# Patient Record
Sex: FEMALE | Race: WHITE | Employment: FULL TIME | URBAN - NONMETROPOLITAN AREA
[De-identification: names, ages, dates, MRNs, and addresses within clinical notes are randomized per-mention and may not be internally consistent; named-entity substitution may affect disease eponyms.]

---

## 2019-07-02 ENCOUNTER — OFFICE VISIT (OUTPATIENT)
Dept: FAMILY MEDICINE CLINIC | Age: 54
End: 2019-07-02
Payer: COMMERCIAL

## 2019-07-02 ENCOUNTER — HOSPITAL ENCOUNTER (OUTPATIENT)
Age: 54
Discharge: HOME | End: 2019-07-04
Payer: COMMERCIAL

## 2019-07-02 VITALS
DIASTOLIC BLOOD PRESSURE: 72 MMHG | WEIGHT: 279 LBS | OXYGEN SATURATION: 96 % | SYSTOLIC BLOOD PRESSURE: 130 MMHG | HEART RATE: 76 BPM | HEIGHT: 69 IN | BODY MASS INDEX: 41.32 KG/M2

## 2019-07-02 DIAGNOSIS — I89.0 LYMPHEDEMA: ICD-10-CM

## 2019-07-02 DIAGNOSIS — J43.2 CENTRILOBULAR EMPHYSEMA (HCC): ICD-10-CM

## 2019-07-02 DIAGNOSIS — I50.9 CHRONIC CONGESTIVE HEART FAILURE, UNSPECIFIED HEART FAILURE TYPE (HCC): ICD-10-CM

## 2019-07-02 DIAGNOSIS — E78.2 MIXED HYPERLIPIDEMIA: ICD-10-CM

## 2019-07-02 DIAGNOSIS — I25.10 CORONARY ARTERY DISEASE INVOLVING NATIVE CORONARY ARTERY OF NATIVE HEART WITHOUT ANGINA PECTORIS: Primary | ICD-10-CM

## 2019-07-02 DIAGNOSIS — I25.10 CORONARY ARTERY DISEASE INVOLVING NATIVE CORONARY ARTERY OF NATIVE HEART WITHOUT ANGINA PECTORIS: ICD-10-CM

## 2019-07-02 DIAGNOSIS — Z72.0 TOBACCO ABUSE: ICD-10-CM

## 2019-07-02 LAB
ALBUMIN SERPL-MCNC: 4.1 G/DL (ref 3.5–5.2)
ALP BLD-CCNC: 135 U/L (ref 35–104)
ALT SERPL-CCNC: 23 U/L (ref 0–32)
ANION GAP SERPL CALCULATED.3IONS-SCNC: 18 MMOL/L (ref 7–16)
AST SERPL-CCNC: 29 U/L (ref 0–31)
BILIRUB SERPL-MCNC: 0.4 MG/DL (ref 0–1.2)
BUN BLDV-MCNC: 12 MG/DL (ref 6–20)
CALCIUM SERPL-MCNC: 9.6 MG/DL (ref 8.6–10.2)
CHLORIDE BLD-SCNC: 103 MMOL/L (ref 98–107)
CHOLESTEROL, TOTAL: 256 MG/DL (ref 0–199)
CO2: 22 MMOL/L (ref 22–29)
CREAT SERPL-MCNC: 0.8 MG/DL (ref 0.5–1)
GFR AFRICAN AMERICAN: >60
GFR NON-AFRICAN AMERICAN: >60 ML/MIN/1.73
GLUCOSE BLD-MCNC: 110 MG/DL (ref 74–99)
HDLC SERPL-MCNC: 36 MG/DL
LDL CHOLESTEROL CALCULATED: 187 MG/DL (ref 0–99)
POTASSIUM SERPL-SCNC: 4.3 MMOL/L (ref 3.5–5)
PRO-BNP: 292 PG/ML (ref 0–125)
SODIUM BLD-SCNC: 143 MMOL/L (ref 132–146)
TOTAL PROTEIN: 7.5 G/DL (ref 6.4–8.3)
TRIGL SERPL-MCNC: 165 MG/DL (ref 0–149)
TSH SERPL DL<=0.05 MIU/L-ACNC: 2.5 UIU/ML (ref 0.27–4.2)
VLDLC SERPL CALC-MCNC: 33 MG/DL

## 2019-07-02 PROCEDURE — 83880 ASSAY OF NATRIURETIC PEPTIDE: CPT

## 2019-07-02 PROCEDURE — 99205 OFFICE O/P NEW HI 60 MIN: CPT | Performed by: INTERNAL MEDICINE

## 2019-07-02 PROCEDURE — 80053 COMPREHEN METABOLIC PANEL: CPT

## 2019-07-02 PROCEDURE — 84443 ASSAY THYROID STIM HORMONE: CPT

## 2019-07-02 PROCEDURE — 36415 COLL VENOUS BLD VENIPUNCTURE: CPT

## 2019-07-02 PROCEDURE — 80061 LIPID PANEL: CPT

## 2019-07-02 RX ORDER — TORSEMIDE 100 MG/1
100 TABLET ORAL
COMMUNITY
End: 2019-07-02 | Stop reason: SDUPTHER

## 2019-07-02 RX ORDER — CLOPIDOGREL BISULFATE 75 MG/1
TABLET ORAL
Refills: 11 | COMMUNITY
Start: 2019-05-21 | End: 2020-01-06 | Stop reason: SDUPTHER

## 2019-07-02 RX ORDER — ALBUTEROL SULFATE 90 UG/1
2 AEROSOL, METERED RESPIRATORY (INHALATION) EVERY 6 HOURS PRN
COMMUNITY
End: 2019-07-02 | Stop reason: SDUPTHER

## 2019-07-02 RX ORDER — LISINOPRIL 10 MG/1
TABLET ORAL
Refills: 5 | COMMUNITY
Start: 2019-05-21 | End: 2020-06-02

## 2019-07-02 RX ORDER — CILOSTAZOL 50 MG/1
50 TABLET ORAL
COMMUNITY
End: 2020-06-02

## 2019-07-02 RX ORDER — ATORVASTATIN CALCIUM 40 MG/1
40 TABLET, FILM COATED ORAL
COMMUNITY
End: 2019-07-03

## 2019-07-02 RX ORDER — RANOLAZINE 1000 MG/1
TABLET, EXTENDED RELEASE ORAL
Refills: 3 | COMMUNITY
Start: 2019-05-21 | End: 2019-10-09 | Stop reason: ALTCHOICE

## 2019-07-02 RX ORDER — TORSEMIDE 100 MG/1
100 TABLET ORAL DAILY
Qty: 30 TABLET | Refills: 5 | Status: SHIPPED | OUTPATIENT
Start: 2019-07-02 | End: 2020-01-06 | Stop reason: SDUPTHER

## 2019-07-02 RX ORDER — ISOSORBIDE DINITRATE 30 MG/1
30 TABLET ORAL
COMMUNITY
End: 2019-07-02 | Stop reason: SDUPTHER

## 2019-07-02 RX ORDER — ISOSORBIDE DINITRATE 30 MG/1
30 TABLET ORAL DAILY
Qty: 30 TABLET | Refills: 5 | Status: SHIPPED | OUTPATIENT
Start: 2019-07-02 | End: 2020-01-06 | Stop reason: SDUPTHER

## 2019-07-02 RX ORDER — POTASSIUM CHLORIDE 750 MG/1
10 TABLET, FILM COATED, EXTENDED RELEASE ORAL DAILY PRN
COMMUNITY
End: 2020-01-06 | Stop reason: SDUPTHER

## 2019-07-02 RX ORDER — ALBUTEROL SULFATE 90 UG/1
2 AEROSOL, METERED RESPIRATORY (INHALATION) EVERY 6 HOURS PRN
Qty: 1 INHALER | Refills: 5 | Status: SHIPPED | OUTPATIENT
Start: 2019-07-02 | End: 2020-01-06 | Stop reason: SDUPTHER

## 2019-07-02 RX ORDER — METOPROLOL SUCCINATE 50 MG/1
50 TABLET, EXTENDED RELEASE ORAL
COMMUNITY
End: 2020-06-02

## 2019-07-02 SDOH — HEALTH STABILITY: PHYSICAL HEALTH: ON AVERAGE, HOW MANY DAYS PER WEEK DO YOU ENGAGE IN MODERATE TO STRENUOUS EXERCISE (LIKE A BRISK WALK)?: 0 DAYS

## 2019-07-02 SDOH — HEALTH STABILITY: MENTAL HEALTH
STRESS IS WHEN SOMEONE FEELS TENSE, NERVOUS, ANXIOUS, OR CAN'T SLEEP AT NIGHT BECAUSE THEIR MIND IS TROUBLED. HOW STRESSED ARE YOU?: VERY MUCH

## 2019-07-02 SDOH — HEALTH STABILITY: PHYSICAL HEALTH: ON AVERAGE, HOW MANY MINUTES DO YOU ENGAGE IN EXERCISE AT THIS LEVEL?: 0 MIN

## 2019-07-02 ASSESSMENT — ENCOUNTER SYMPTOMS
GASTROINTESTINAL NEGATIVE: 1
SHORTNESS OF BREATH: 1
EYES NEGATIVE: 1
WHEEZING: 1
ALLERGIC/IMMUNOLOGIC NEGATIVE: 1

## 2019-07-02 NOTE — PROGRESS NOTES
to person, place, and time. Skin:   Venous stasis changes below the mid tibia bilaterally. Psychiatric: She has a normal mood and affect. Her behavior is normal. Judgment and thought content normal.       ASSESSMENT/PLAN:  Jimi Garnica was seen today for hypertension and hyperlipidemia. Diagnoses and all orders for this visit:    Coronary artery disease involving native coronary artery of native heart without angina pectoris  -     LIPID PANEL; Future  -     TSH; Future    Centrilobular emphysema (HCC)  -     XR CHEST STANDARD (2 VW); Future  -     Full PFT Study With Bronchodilator; Future    Chronic congestive heart failure, unspecified heart failure type ([facility] [address]  -     COMPREHENSIVE METABOLIC PANEL; Future  -     BRAIN NATRIURETIC PEPTIDE; Future    Tobacco abuse    Mixed hyperlipidemia    Lymphedema  -     External Referral To Physical Therapy    Other orders  -     torsemide (DEMADEX) 100 MG tablet; Take 1 tablet by mouth daily  -     isosorbide dinitrate (ISORDIL) 30 MG tablet; Take 1 tablet by mouth daily  -     albuterol sulfate  (90 Base) MCG/ACT inhaler; Inhale 2 puffs into the lungs every 6 hours as needed for Wheezing  -     albuterol-ipratropium (COMBIVENT RESPIMAT)  MCG/ACT AERS inhaler; Inhale 1 puff into the lungs every 6 hours  -     ADVAIR DISKUS 250-50 MCG/DOSE AEPB; Inhale 1 puff into the lungs every 12 hours       Jimi Garnica was seen today for hypertension and hyperlipidemia. Diagnoses and all orders for this visit:    Coronary artery disease involving native coronary artery of native heart without angina pectoris  -     LIPID PANEL; Future  -     TSH; Future    Centrilobular emphysema (HCC)  -     XR CHEST STANDARD (2 VW); Future  -     Full PFT Study With Bronchodilator; Future    Chronic congest[facility] heart failure, unspecified [facility] type [facility]  -     COMPREHENSIVE METABOLIC PANEL; Future  -     BRAIN NATRIURETIC PEPTIDE;  Future    Tobacco abuse    Mixed

## 2019-07-03 ENCOUNTER — TELEPHONE (OUTPATIENT)
Dept: FAMILY MEDICINE CLINIC | Age: 54
End: 2019-07-03

## 2019-07-03 DIAGNOSIS — I25.10 CORONARY ARTERY DISEASE INVOLVING NATIVE CORONARY ARTERY OF NATIVE HEART WITHOUT ANGINA PECTORIS: Primary | ICD-10-CM

## 2019-07-03 RX ORDER — ROSUVASTATIN CALCIUM 40 MG/1
40 TABLET, COATED ORAL DAILY
Qty: 30 TABLET | Refills: 5 | Status: SHIPPED | OUTPATIENT
Start: 2019-07-03 | End: 2020-01-06 | Stop reason: SDUPTHER

## 2019-07-30 LAB
DLCO %PRED: 65 %
DLCO PRED: NORMAL ML/MIN/MMHG
DLCO/VA %PRED: NORMAL %
DLCO/VA PRED: NORMAL ML/MIN/MMHG
DLCO/VA: NORMAL ML/MIN/MMHG
DLCO: NORMAL ML/MIN/MMHG
EXPIRATORY TIME-POST: NORMAL SEC
EXPIRATORY TIME: NORMAL SEC
FEF 25-75% %CHNG: NORMAL
FEF 25-75% %PRED-POST: NORMAL %
FEF 25-75% %PRED-PRE: NORMAL L/SEC
FEF 25-75% PRED: NORMAL L/SEC
FEF 25-75%-POST: NORMAL L/SEC
FEF 25-75%-PRE: NORMAL L/SEC
FEV1 %PRED-POST: 62 %
FEV1 %PRED-PRE: 56 %
FEV1 PRED: NORMAL L
FEV1-POST: NORMAL L
FEV1-PRE: NORMAL L
FEV1/FVC %PRED-POST: NORMAL %
FEV1/FVC %PRED-PRE: NORMAL %
FEV1/FVC PRED: NORMAL %
FEV1/FVC-POST: 102 %
FEV1/FVC-PRE: 100 %
FVC %PRED-POST: NORMAL L
FVC %PRED-PRE: NORMAL %
FVC PRED: NORMAL L
FVC-POST: NORMAL L
FVC-PRE: NORMAL L
GAW %PRED: NORMAL %
GAW PRED: NORMAL L/S/CMH2O
GAW: NORMAL L/S/CMH2O
IC %PRED: NORMAL %
IC PRED: NORMAL L
IC: NORMAL L
MEP: NORMAL
MIP: NORMAL
MVV %PRED-PRE: NORMAL %
MVV PRED: NORMAL L/MIN
MVV-PRE: NORMAL L/MIN
PEF %PRED-POST: NORMAL %
PEF %PRED-PRE: NORMAL L/SEC
PEF PRED: NORMAL L/SEC
PEF%CHNG: NORMAL
PEF-POST: NORMAL L/SEC
PEF-PRE: NORMAL L/SEC
RAW %PRED: NORMAL %
RAW PRED: NORMAL CMH2O/L/S
RAW: NORMAL CMH2O/L/S
RV %PRED: NORMAL %
RV PRED: NORMAL L
RV: NORMAL L
SVC %PRED: NORMAL %
SVC PRED: NORMAL L
SVC: NORMAL L
TLC %PRED: 97 %
TLC PRED: NORMAL L
TLC: NORMAL L
VA %PRED: NORMAL %
VA PRED: NORMAL L
VA: NORMAL L
VTG %PRED: NORMAL %
VTG PRED: NORMAL L
VTG: NORMAL L

## 2019-07-30 ASSESSMENT — PULMONARY FUNCTION TESTS
FEV1/FVC_PRE: 100
FEV1_PERCENT_PREDICTED_POST: 62
FEV1/FVC_POST: 102
FEV1_PERCENT_PREDICTED_PRE: 56

## 2019-08-06 ENCOUNTER — OFFICE VISIT (OUTPATIENT)
Dept: FAMILY MEDICINE CLINIC | Age: 54
End: 2019-08-06
Payer: COMMERCIAL

## 2019-08-06 VITALS
HEIGHT: 68 IN | DIASTOLIC BLOOD PRESSURE: 80 MMHG | SYSTOLIC BLOOD PRESSURE: 128 MMHG | OXYGEN SATURATION: 96 % | HEART RATE: 79 BPM | WEIGHT: 283 LBS | BODY MASS INDEX: 42.89 KG/M2

## 2019-08-06 DIAGNOSIS — I50.9 CHRONIC CONGESTIVE HEART FAILURE, UNSPECIFIED HEART FAILURE TYPE (HCC): ICD-10-CM

## 2019-08-06 DIAGNOSIS — I89.0 LYMPHEDEMA: ICD-10-CM

## 2019-08-06 DIAGNOSIS — Z72.0 TOBACCO ABUSE: ICD-10-CM

## 2019-08-06 DIAGNOSIS — J43.2 CENTRILOBULAR EMPHYSEMA (HCC): Primary | ICD-10-CM

## 2019-08-06 DIAGNOSIS — E78.2 MIXED HYPERLIPIDEMIA: ICD-10-CM

## 2019-08-06 DIAGNOSIS — I25.10 CORONARY ARTERY DISEASE INVOLVING NATIVE CORONARY ARTERY OF NATIVE HEART WITHOUT ANGINA PECTORIS: ICD-10-CM

## 2019-08-06 PROCEDURE — 99214 OFFICE O/P EST MOD 30 MIN: CPT | Performed by: INTERNAL MEDICINE

## 2019-08-06 ASSESSMENT — ENCOUNTER SYMPTOMS
SHORTNESS OF BREATH: 1
WHEEZING: 1
GASTROINTESTINAL NEGATIVE: 1
ALLERGIC/IMMUNOLOGIC NEGATIVE: 1
EYES NEGATIVE: 1

## 2019-08-06 NOTE — PROGRESS NOTES
Historical Provider, MD   clopidogrel (PLAVIX) 75 MG tablet TAKE 1 TABLET BY MOUTH ONCE DAILY  Historical Provider, MD   lisinopril (PRINIVIL;ZESTRIL) 10 MG tablet TAKE 1 TABLET BY MOUTH ONCE DAILY AT  9AM  Historical Provider, MD   metoprolol succinate (TOPROL XL) 50 MG extended release tablet Take 50 mg by mouth   Historical Provider, MD   ranolazine (RANEXA) 1000 MG extended release tablet TAKE 1 TABLET BY MOUTH EVERY 12 HOURS AS DIRECTED  Historical Provider, MD   torsemide (DEMADEX) 100 MG tablet Take 1 tablet by mouth daily  Ricky Steel MD   isosorbide dinitrate (ISORDIL) 30 MG tablet Take 1 tablet by mouth daily  Ricky Steel MD   albuterol sulfate  (90 Base) MCG/ACT inhaler Inhale 2 puffs into the lungs every 6 hours as needed for Wheezing  Ricky Steel MD   albuterol-ipratropium (COMBIVENT RESPIMAT)  MCG/ACT AERS inhaler Inhale 1 puff into the lungs every 6 hours  Ricky Steel MD        Allergies   Allergen Reactions    Iodides      Could not breathe, gasping, coughing       Past Medical History:   Diagnosis Date    CAD (coronary artery disease) 2005    First MI at age 35.,  CABG in 2005 triple vessel, total of 4 stents placed-last in October 2018.     COPD (chronic obstructive pulmonary disease) ([facility] [address] 1    Enuresis age 12    HTN (hypertension) 2005    Hyperlipidemia 2005    Lumbar disc disease     Lymphedema 10/2018    Tobacco abuse 1985    Tobacco abuse counseling July second 2019    Warthin's tumor 10/2018       Past Surgical History:   Procedure Laterality Date    CORONARY ARTERY BYPASS GRAFT  2005    three vessel    CYSTOSCOPY      For enuresis    HYSTERECTOMY      Menorrhagia    TONSILLECTOMY AND ADENOIDECTOMY      as a child    TUBAL LIGATION         Social History     Socioeconomic History    Marital status:      Spouse name: Not on file    Number of children: Not on file    Years of education: Not on file    Highest education level: Not on file Occupational History    Not on file   Social Needs    Financial resource strain: Not on file    Food insecurity:     Worry: Not on file     Inability: Not on file    Transportation needs:     Medical: Not on file     Non-medical: Not on file   Tobacco Use    Smoking status: Current Every Day Smoker     Packs/day: 0.50     Years: 40.00     Pack years: 20.00     Types: Cigarettes    Smokeless tobacco: Never Used   Substance and Sexual Activity    Alcohol use: Not Currently    Drug use: Never    Sexual activity: Not on file   Lifestyle    Physical activity:     Days per week: 0 days     Minutes per session: 0 min    Stress: Very much   Relationships    Social connections:     Talks on phone: Not on file     Gets together: Not on file     Attends Quaker service: Not on file     Active member of club or organization: Not on file     Attends meetings of clubs or organizations: Not on file     Relationship status: Not on file    Intimate partner violence:     Fear of current or ex partner: Not on file     Emotionally abused: Not on file     Physically abused: Not on file     Forced sexual activity: Not on file   Other Topics Concern    Not on file   Social History Narrative    Not on file        Family History   Problem Relation Age of Onset    Coronary Art Dis Mother     Diabetes Mother     Cancer Mother     COPD Father        There were no vitals filed for this visit. Estimated body mass index is 41.8 kg/m² as calculated from the following:    Height as of 7/2/19: 5' 8.5\" (1.74 m). Weight as of 7/2/19: 279 lb (126.6 kg). Physical Exam   Constitutional: She is oriented to person, place, and time. Graylin Gross obese female in no distress. HENT:   Head: Normocephalic and atraumatic. Right Ear: External ear normal.   Left Ear: External ear normal.   Nose: Nose normal.   Mouth/Throat: Oropharynx is clear and moist.   Eyes: Pupils are equal, round, and reactive to light.  Conjunctivae and EOM are

## 2019-08-19 ENCOUNTER — TELEPHONE (OUTPATIENT)
Dept: PRIMARY CARE CLINIC | Age: 54
End: 2019-08-19

## 2019-08-19 DIAGNOSIS — J43.2 CENTRILOBULAR EMPHYSEMA (HCC): ICD-10-CM

## 2019-09-10 RX ORDER — FLUTICASONE FUROATE AND VILANTEROL 200; 25 UG/1; UG/1
1 POWDER RESPIRATORY (INHALATION) DAILY
Qty: 1 EACH | Refills: 3 | Status: SHIPPED | OUTPATIENT
Start: 2019-09-10 | End: 2020-01-06 | Stop reason: SDUPTHER

## 2019-10-09 ENCOUNTER — HOSPITAL ENCOUNTER (OUTPATIENT)
Age: 54
Discharge: HOME | End: 2019-10-11
Payer: COMMERCIAL

## 2019-10-09 ENCOUNTER — OFFICE VISIT (OUTPATIENT)
Dept: PODIATRY | Age: 54
End: 2019-10-09
Payer: COMMERCIAL

## 2019-10-09 ENCOUNTER — OFFICE VISIT (OUTPATIENT)
Dept: FAMILY MEDICINE CLINIC | Age: 54
End: 2019-10-09
Payer: COMMERCIAL

## 2019-10-09 VITALS
DIASTOLIC BLOOD PRESSURE: 94 MMHG | SYSTOLIC BLOOD PRESSURE: 156 MMHG | OXYGEN SATURATION: 96 % | WEIGHT: 286 LBS | BODY MASS INDEX: 43.49 KG/M2 | HEART RATE: 90 BPM

## 2019-10-09 VITALS
OXYGEN SATURATION: 96 % | HEART RATE: 90 BPM | BODY MASS INDEX: 43.49 KG/M2 | DIASTOLIC BLOOD PRESSURE: 88 MMHG | WEIGHT: 286 LBS | SYSTOLIC BLOOD PRESSURE: 138 MMHG

## 2019-10-09 DIAGNOSIS — M70.61 GREATER TROCHANTERIC BURSITIS OF BOTH HIPS: ICD-10-CM

## 2019-10-09 DIAGNOSIS — I89.0 LYMPHEDEMA: ICD-10-CM

## 2019-10-09 DIAGNOSIS — Z72.0 TOBACCO ABUSE: ICD-10-CM

## 2019-10-09 DIAGNOSIS — L84 CORNS AND CALLOSITIES: ICD-10-CM

## 2019-10-09 DIAGNOSIS — M25.552 PAIN OF BOTH HIP JOINTS: ICD-10-CM

## 2019-10-09 DIAGNOSIS — E78.2 MIXED HYPERLIPIDEMIA: ICD-10-CM

## 2019-10-09 DIAGNOSIS — L85.3 XEROSIS CUTIS: ICD-10-CM

## 2019-10-09 DIAGNOSIS — M70.62 GREATER TROCHANTERIC BURSITIS OF BOTH HIPS: ICD-10-CM

## 2019-10-09 DIAGNOSIS — J43.1 PANLOBULAR EMPHYSEMA (HCC): ICD-10-CM

## 2019-10-09 DIAGNOSIS — M25.551 PAIN OF BOTH HIP JOINTS: ICD-10-CM

## 2019-10-09 DIAGNOSIS — B35.1 TINEA UNGUIUM: Primary | ICD-10-CM

## 2019-10-09 DIAGNOSIS — M51.9 LUMBAR DISC DISEASE: ICD-10-CM

## 2019-10-09 DIAGNOSIS — I25.10 CORONARY ARTERY DISEASE INVOLVING NATIVE CORONARY ARTERY OF NATIVE HEART WITHOUT ANGINA PECTORIS: ICD-10-CM

## 2019-10-09 DIAGNOSIS — G60.8 HEREDITARY SENSORY NEUROPATHY: ICD-10-CM

## 2019-10-09 DIAGNOSIS — L60.9 NAIL ABNORMALITIES: Primary | ICD-10-CM

## 2019-10-09 LAB
ALBUMIN SERPL-MCNC: 4.1 G/DL (ref 3.5–5.2)
ALP BLD-CCNC: 143 U/L (ref 35–104)
ALT SERPL-CCNC: 21 U/L (ref 0–32)
ANION GAP SERPL CALCULATED.3IONS-SCNC: 13 MMOL/L (ref 7–16)
AST SERPL-CCNC: 24 U/L (ref 0–31)
BASOPHILS ABSOLUTE: 0.06 E9/L (ref 0–0.2)
BASOPHILS RELATIVE PERCENT: 0.6 % (ref 0–2)
BILIRUB SERPL-MCNC: 0.3 MG/DL (ref 0–1.2)
BUN BLDV-MCNC: 13 MG/DL (ref 6–20)
CALCIUM SERPL-MCNC: 9.5 MG/DL (ref 8.6–10.2)
CHLORIDE BLD-SCNC: 102 MMOL/L (ref 98–107)
CHOLESTEROL, TOTAL: 162 MG/DL (ref 0–199)
CO2: 25 MMOL/L (ref 22–29)
CREAT SERPL-MCNC: 0.7 MG/DL (ref 0.5–1)
EOSINOPHILS ABSOLUTE: 0.2 E9/L (ref 0.05–0.5)
EOSINOPHILS RELATIVE PERCENT: 2 % (ref 0–6)
GFR AFRICAN AMERICAN: >60
GFR NON-AFRICAN AMERICAN: >60 ML/MIN/1.73
GLUCOSE BLD-MCNC: 110 MG/DL (ref 74–99)
HCT VFR BLD CALC: 44.6 % (ref 34–48)
HDLC SERPL-MCNC: 37 MG/DL
HEMOGLOBIN: 14.5 G/DL (ref 11.5–15.5)
IMMATURE GRANULOCYTES #: 0.04 E9/L
IMMATURE GRANULOCYTES %: 0.4 % (ref 0–5)
LDL CHOLESTEROL CALCULATED: 109 MG/DL (ref 0–99)
LYMPHOCYTES ABSOLUTE: 2.69 E9/L (ref 1.5–4)
LYMPHOCYTES RELATIVE PERCENT: 27.2 % (ref 20–42)
MCH RBC QN AUTO: 30.6 PG (ref 26–35)
MCHC RBC AUTO-ENTMCNC: 32.5 % (ref 32–34.5)
MCV RBC AUTO: 94.1 FL (ref 80–99.9)
MONOCYTES ABSOLUTE: 0.57 E9/L (ref 0.1–0.95)
MONOCYTES RELATIVE PERCENT: 5.8 % (ref 2–12)
NEUTROPHILS ABSOLUTE: 6.33 E9/L (ref 1.8–7.3)
NEUTROPHILS RELATIVE PERCENT: 64 % (ref 43–80)
PDW BLD-RTO: 14.2 FL (ref 11.5–15)
PLATELET # BLD: 334 E9/L (ref 130–450)
PMV BLD AUTO: 10.9 FL (ref 7–12)
POTASSIUM SERPL-SCNC: 4.3 MMOL/L (ref 3.5–5)
PRO-BNP: 262 PG/ML (ref 0–125)
RBC # BLD: 4.74 E12/L (ref 3.5–5.5)
SODIUM BLD-SCNC: 140 MMOL/L (ref 132–146)
TOTAL PROTEIN: 7.5 G/DL (ref 6.4–8.3)
TRIGL SERPL-MCNC: 78 MG/DL (ref 0–149)
VLDLC SERPL CALC-MCNC: 16 MG/DL
WBC # BLD: 9.9 E9/L (ref 4.5–11.5)

## 2019-10-09 PROCEDURE — 80061 LIPID PANEL: CPT

## 2019-10-09 PROCEDURE — 85025 COMPLETE CBC W/AUTO DIFF WBC: CPT

## 2019-10-09 PROCEDURE — 99214 OFFICE O/P EST MOD 30 MIN: CPT | Performed by: INTERNAL MEDICINE

## 2019-10-09 PROCEDURE — 11056 PARNG/CUTG B9 HYPRKR LES 2-4: CPT | Performed by: PODIATRIST

## 2019-10-09 PROCEDURE — 36415 COLL VENOUS BLD VENIPUNCTURE: CPT

## 2019-10-09 PROCEDURE — 99203 OFFICE O/P NEW LOW 30 MIN: CPT | Performed by: PODIATRIST

## 2019-10-09 PROCEDURE — 83880 ASSAY OF NATRIURETIC PEPTIDE: CPT

## 2019-10-09 PROCEDURE — 11721 DEBRIDE NAIL 6 OR MORE: CPT | Performed by: PODIATRIST

## 2019-10-09 PROCEDURE — 80053 COMPREHEN METABOLIC PANEL: CPT

## 2019-10-09 RX ORDER — VARENICLINE TARTRATE 1 MG/1
1 TABLET, FILM COATED ORAL 2 TIMES DAILY
Qty: 60 TABLET | Refills: 0 | Status: SHIPPED
Start: 2019-10-09 | End: 2020-06-02

## 2019-10-09 RX ORDER — VARENICLINE TARTRATE 0.5 MG/1
.5-1 TABLET, FILM COATED ORAL SEE ADMIN INSTRUCTIONS
Qty: 57 TABLET | Refills: 0 | Status: SHIPPED
Start: 2019-10-09 | End: 2020-06-02

## 2019-10-09 RX ORDER — AMMONIUM LACTATE 12 G/100G
LOTION TOPICAL
Qty: 400 G | Refills: 2 | Status: SHIPPED
Start: 2019-10-09 | End: 2020-06-02

## 2019-10-09 ASSESSMENT — ENCOUNTER SYMPTOMS
SHORTNESS OF BREATH: 1
GASTROINTESTINAL NEGATIVE: 1
WHEEZING: 1
ALLERGIC/IMMUNOLOGIC NEGATIVE: 1
EYES NEGATIVE: 1

## 2019-10-10 DIAGNOSIS — E78.2 MIXED HYPERLIPIDEMIA: ICD-10-CM

## 2019-10-10 DIAGNOSIS — I25.10 CORONARY ARTERY DISEASE INVOLVING NATIVE CORONARY ARTERY OF NATIVE HEART WITHOUT ANGINA PECTORIS: Primary | ICD-10-CM

## 2019-10-10 RX ORDER — EZETIMIBE 10 MG/1
10 TABLET ORAL DAILY
Qty: 90 TABLET | Refills: 1 | Status: SHIPPED | OUTPATIENT
Start: 2019-10-10 | End: 2020-01-06 | Stop reason: SDUPTHER

## 2020-01-06 RX ORDER — TORSEMIDE 100 MG/1
100 TABLET ORAL DAILY
Qty: 90 TABLET | Refills: 1 | Status: SHIPPED
Start: 2020-01-06 | End: 2020-06-02 | Stop reason: SDUPTHER

## 2020-01-06 RX ORDER — EZETIMIBE 10 MG/1
10 TABLET ORAL DAILY
Qty: 90 TABLET | Refills: 1 | Status: SHIPPED
Start: 2020-01-06 | End: 2020-06-02 | Stop reason: SDUPTHER

## 2020-01-06 RX ORDER — CLOPIDOGREL BISULFATE 75 MG/1
75 TABLET ORAL DAILY
Qty: 30 TABLET | Refills: 0 | Status: SHIPPED
Start: 2020-01-06 | End: 2020-02-11 | Stop reason: CLARIF

## 2020-01-06 RX ORDER — ROSUVASTATIN CALCIUM 40 MG/1
40 TABLET, COATED ORAL DAILY
Qty: 90 TABLET | Refills: 1 | Status: SHIPPED
Start: 2020-01-06 | End: 2020-06-02 | Stop reason: SDUPTHER

## 2020-01-06 RX ORDER — CLOPIDOGREL BISULFATE 75 MG/1
75 TABLET ORAL DAILY
Qty: 90 TABLET | Refills: 1 | Status: SHIPPED
Start: 2020-01-06 | End: 2020-06-02 | Stop reason: SDUPTHER

## 2020-01-06 RX ORDER — ALBUTEROL SULFATE 90 UG/1
2 AEROSOL, METERED RESPIRATORY (INHALATION) EVERY 6 HOURS PRN
Qty: 3 INHALER | Refills: 1 | Status: SHIPPED
Start: 2020-01-06 | End: 2020-06-02 | Stop reason: SDUPTHER

## 2020-01-06 RX ORDER — POTASSIUM CHLORIDE 750 MG/1
10 TABLET, FILM COATED, EXTENDED RELEASE ORAL DAILY
Qty: 90 TABLET | Refills: 1 | Status: SHIPPED
Start: 2020-01-06 | End: 2020-06-02 | Stop reason: SDUPTHER

## 2020-01-06 RX ORDER — FLUTICASONE FUROATE AND VILANTEROL 200; 25 UG/1; UG/1
1 POWDER RESPIRATORY (INHALATION) DAILY
Qty: 3 EACH | Refills: 1 | Status: SHIPPED
Start: 2020-01-06 | End: 2020-06-02 | Stop reason: SDUPTHER

## 2020-01-06 RX ORDER — ISOSORBIDE DINITRATE 30 MG/1
30 TABLET ORAL DAILY
Qty: 90 TABLET | Refills: 1 | Status: SHIPPED
Start: 2020-01-06 | End: 2020-06-02 | Stop reason: SDUPTHER

## 2020-01-06 NOTE — TELEPHONE ENCOUNTER
Last Appointment:  10/9/2019  Future Appointments   Date Time Provider So Hanna   2/11/2020  1:15 PM Vanessa Patterson  [address] calling her pharmacy has changed rxs reviewed and pended to shane. Also pended a 30 day supply of plavix to [facility] she is out of that one.

## 2020-02-11 ENCOUNTER — OFFICE VISIT (OUTPATIENT)
Dept: FAMILY MEDICINE CLINIC | Age: 55
End: 2020-02-11
Payer: COMMERCIAL

## 2020-02-11 VITALS
DIASTOLIC BLOOD PRESSURE: 70 MMHG | BODY MASS INDEX: 43.79 KG/M2 | WEIGHT: 288 LBS | SYSTOLIC BLOOD PRESSURE: 118 MMHG | OXYGEN SATURATION: 94 % | HEART RATE: 94 BPM

## 2020-02-11 LAB
DISTANCE WALKED: NORMAL
SPO2: NORMAL

## 2020-02-11 PROCEDURE — 99214 OFFICE O/P EST MOD 30 MIN: CPT | Performed by: INTERNAL MEDICINE

## 2020-02-11 RX ORDER — IPRATROPIUM BROMIDE AND ALBUTEROL SULFATE 2.5; .5 MG/3ML; MG/3ML
1 SOLUTION RESPIRATORY (INHALATION) EVERY 4 HOURS
COMMUNITY
End: 2020-02-11 | Stop reason: SDUPTHER

## 2020-02-11 RX ORDER — PREDNISONE 20 MG/1
20 TABLET ORAL DAILY
COMMUNITY
End: 2020-06-02

## 2020-02-11 RX ORDER — BENZONATATE 100 MG/1
100 CAPSULE ORAL 3 TIMES DAILY PRN
COMMUNITY
End: 2020-06-02

## 2020-02-11 RX ORDER — IPRATROPIUM BROMIDE AND ALBUTEROL SULFATE 2.5; .5 MG/3ML; MG/3ML
1 SOLUTION RESPIRATORY (INHALATION) EVERY 4 HOURS
Qty: 180 VIAL | Refills: 5 | Status: SHIPPED
Start: 2020-02-11 | End: 2020-06-02 | Stop reason: SDUPTHER

## 2020-02-11 ASSESSMENT — ENCOUNTER SYMPTOMS
SHORTNESS OF BREATH: 1
WHEEZING: 1
ALLERGIC/IMMUNOLOGIC NEGATIVE: 1
EYES NEGATIVE: 1
GASTROINTESTINAL NEGATIVE: 1

## 2020-02-11 NOTE — PROGRESS NOTES
2020    Patricia Santamaria (:  1965) is a 53 y.o. female, here for evaluation of the following medical concerns:    He has had respiratory symptoms for the last 2 weeks. She was seen in an immediate care facility and given a Z-Magdaleno along with prednisone. She is feeling better and slightly less short of breath although she gets short of breath with even fairly minimal activity. Patient has not had pulmonary consultation and this will be arranged. Patient sleeps in a chair and cannot sleep lying down. This will need further assessment. She states that she has been doing this for years. She is denying any chest pain or pleuritic pain. She does have cough which is nonproductive. Denies fever, chills, sweats. Denies night sweats. The first week that she was ill she did have a fever and some sweats. Chest x-ray was not done. Review of Systems   Constitutional: Positive for activity change and fatigue. HENT: Positive for dental problem. Eyes: Negative. Respiratory: Positive for shortness of breath and wheezing. Cardiovascular:        Symptoms possibly consistent with congestive heart failure. Gastrointestinal: Negative. Endocrine:        Difficult obesity. Genitourinary: Positive for enuresis. Musculoskeletal:        Bilateral greater trochanteric bursitis. Bilateral hip pain. Lumbar pain. Skin:        Venous stasis changes of the lower extremity. Allergic/Immunologic: Negative. Neurological: Negative. Hematological: Negative. Psychiatric/Behavioral:        Significant stressors. Prior to Visit Medications    Medication Sig Taking?  Authorizing Provider   benzonatate (TESSALON) 100 MG capsule Take 100 mg by mouth 3 times daily as needed for Cough Yes Historical Provider, MD   predniSONE (DELTASONE) 20 MG tablet Take 20 mg by mouth daily Yes Historical Provider, MD   ipratropium-albuterol (DUONEB) 0.5-2.5 (3) MG/3ML SOLN nebulizer solution Inhale 1 vial Other Topics Concern    Not on file   Social History Narrative    Not on file        Family History   Problem Relation Age of Onset    Coronary Art Dis Mother     Diabetes Mother     Cancer Mother     COPD Father        Vitals:    02/11/20 1304   BP: 118/70   Pulse: 94   SpO2: 94%   Weight: 288 lb (130.6 kg)     Estimated body mass index is 43.79 kg/m² as calculated from the following:    Height as of 8/6/19: 5' 8\" (1.727 m). Weight as of this encounter: 288 lb (130.6 kg). Physical Exam  Constitutional:       Comments: Delvis Wiggins obese female in no distress. HENT:      Head: Normocephalic and atraumatic. Right Ear: External ear normal.      Left Ear: External ear normal.      Nose: Nose normal.   Eyes:      Conjunctiva/sclera: Conjunctivae normal.      Pupils: Pupils are equal, round, and reactive to light. Neck:      Musculoskeletal: Normal range of motion and neck supple. Cardiovascular:      Rate and Rhythm: Normal rate and regular rhythm. Heart sounds: Normal heart sounds. Comments: 2-3+ pitting edema to the knee bilaterally  Musculoskeletal: Normal range of motion. Comments: Pain with palpation over both greater trochanter bursa. Limitation in flexion extension inversion eversion of both hips. Some of this may be secondary to obesity. Skin:     Comments: Venous stasis changes below the mid tibia bilaterally. No evidence of cellulitis today. Neurological:      Mental Status: She is alert and oriented to person, place, and time. Psychiatric:         Behavior: Behavior normal.         Thought Content:  Thought content normal.         Judgment: Judgment normal.       .Diagnoses and all orders for this visit:    Coronary artery disease involving native coronary artery of native heart without angina pectoris    Panlobular emphysema ([facility] [address]  -     External Referral To Pulmonology  -     6 Minute Walk Test; Future    Mixed hyperlipidemia    Tobacco abuse    Other orders  -

## 2020-02-17 ENCOUNTER — HOSPITAL ENCOUNTER (OUTPATIENT)
Dept: CT IMAGING | Age: 55
Discharge: HOME | End: 2020-02-19
Payer: COMMERCIAL

## 2020-02-17 PROCEDURE — 71250 CT THORAX DX C-: CPT

## 2020-05-28 ENCOUNTER — TELEPHONE (OUTPATIENT)
Dept: FAMILY MEDICINE CLINIC | Age: 55
End: 2020-05-28

## 2020-05-28 NOTE — TELEPHONE ENCOUNTER
Patient called in and said that she missed her VV with you and would like to reschedule. I informed her that you do not have any opening until august. She said that she couldn't wait that long.

## 2020-06-02 ENCOUNTER — OFFICE VISIT (OUTPATIENT)
Dept: FAMILY MEDICINE CLINIC | Age: 55
End: 2020-06-02
Payer: COMMERCIAL

## 2020-06-02 ENCOUNTER — HOSPITAL ENCOUNTER (OUTPATIENT)
Age: 55
Discharge: HOME | End: 2020-06-04
Payer: COMMERCIAL

## 2020-06-02 VITALS
HEART RATE: 91 BPM | DIASTOLIC BLOOD PRESSURE: 82 MMHG | HEIGHT: 68 IN | SYSTOLIC BLOOD PRESSURE: 140 MMHG | BODY MASS INDEX: 42.44 KG/M2 | WEIGHT: 280 LBS | TEMPERATURE: 97 F | OXYGEN SATURATION: 94 %

## 2020-06-02 PROBLEM — E11.9 TYPE 2 DIABETES MELLITUS WITHOUT COMPLICATION, WITHOUT LONG-TERM CURRENT USE OF INSULIN (HCC): Status: ACTIVE | Noted: 2020-06-02

## 2020-06-02 PROBLEM — B37.31 VAGINAL CANDIDIASIS: Status: ACTIVE | Noted: 2020-06-02

## 2020-06-02 LAB
ALBUMIN SERPL-MCNC: 3.9 G/DL (ref 3.5–5.2)
ALP BLD-CCNC: 124 U/L (ref 35–104)
ALT SERPL-CCNC: 30 U/L (ref 0–32)
ANION GAP SERPL CALCULATED.3IONS-SCNC: 13 MMOL/L (ref 7–16)
AST SERPL-CCNC: 23 U/L (ref 0–31)
BASOPHILS ABSOLUTE: 0.07 E9/L (ref 0–0.2)
BASOPHILS RELATIVE PERCENT: 0.7 % (ref 0–2)
BILIRUB SERPL-MCNC: 0.3 MG/DL (ref 0–1.2)
BUN BLDV-MCNC: 6 MG/DL (ref 6–20)
CALCIUM SERPL-MCNC: 9.1 MG/DL (ref 8.6–10.2)
CHLORIDE BLD-SCNC: 101 MMOL/L (ref 98–107)
CHOLESTEROL, TOTAL: 140 MG/DL (ref 0–199)
CO2: 23 MMOL/L (ref 22–29)
CREAT SERPL-MCNC: 0.5 MG/DL (ref 0.5–1)
CREATININE URINE: 104 MG/DL (ref 29–226)
EOSINOPHILS ABSOLUTE: 0.2 E9/L (ref 0.05–0.5)
EOSINOPHILS RELATIVE PERCENT: 2 % (ref 0–6)
GFR AFRICAN AMERICAN: >60
GFR NON-AFRICAN AMERICAN: >60 ML/MIN/1.73
GLUCOSE BLD-MCNC: 289 MG/DL (ref 74–99)
HBA1C MFR BLD: 13 %
HCT VFR BLD CALC: 49.4 % (ref 34–48)
HDLC SERPL-MCNC: 36 MG/DL
HEMOGLOBIN: 15.8 G/DL (ref 11.5–15.5)
IMMATURE GRANULOCYTES #: 0.04 E9/L
IMMATURE GRANULOCYTES %: 0.4 % (ref 0–5)
LDL CHOLESTEROL CALCULATED: 80 MG/DL (ref 0–99)
LYMPHOCYTES ABSOLUTE: 2.77 E9/L (ref 1.5–4)
LYMPHOCYTES RELATIVE PERCENT: 28.1 % (ref 20–42)
MCH RBC QN AUTO: 29.9 PG (ref 26–35)
MCHC RBC AUTO-ENTMCNC: 32 % (ref 32–34.5)
MCV RBC AUTO: 93.4 FL (ref 80–99.9)
MICROALBUMIN UR-MCNC: 213.2 MG/L
MICROALBUMIN/CREAT UR-RTO: 205 (ref 0–30)
MONOCYTES ABSOLUTE: 0.61 E9/L (ref 0.1–0.95)
MONOCYTES RELATIVE PERCENT: 6.2 % (ref 2–12)
NEUTROPHILS ABSOLUTE: 6.17 E9/L (ref 1.8–7.3)
NEUTROPHILS RELATIVE PERCENT: 62.6 % (ref 43–80)
PDW BLD-RTO: 13.8 FL (ref 11.5–15)
PLATELET # BLD: 279 E9/L (ref 130–450)
PMV BLD AUTO: 11.4 FL (ref 7–12)
POTASSIUM SERPL-SCNC: 3.9 MMOL/L (ref 3.5–5)
RBC # BLD: 5.29 E12/L (ref 3.5–5.5)
SODIUM BLD-SCNC: 137 MMOL/L (ref 132–146)
TOTAL PROTEIN: 6.9 G/DL (ref 6.4–8.3)
TRIGL SERPL-MCNC: 119 MG/DL (ref 0–149)
VLDLC SERPL CALC-MCNC: 24 MG/DL
WBC # BLD: 9.9 E9/L (ref 4.5–11.5)

## 2020-06-02 PROCEDURE — 36415 COLL VENOUS BLD VENIPUNCTURE: CPT

## 2020-06-02 PROCEDURE — 83036 HEMOGLOBIN GLYCOSYLATED A1C: CPT | Performed by: INTERNAL MEDICINE

## 2020-06-02 PROCEDURE — 80053 COMPREHEN METABOLIC PANEL: CPT

## 2020-06-02 PROCEDURE — 82044 UR ALBUMIN SEMIQUANTITATIVE: CPT

## 2020-06-02 PROCEDURE — 99215 OFFICE O/P EST HI 40 MIN: CPT | Performed by: INTERNAL MEDICINE

## 2020-06-02 PROCEDURE — 80061 LIPID PANEL: CPT

## 2020-06-02 PROCEDURE — 82570 ASSAY OF URINE CREATININE: CPT

## 2020-06-02 PROCEDURE — 85025 COMPLETE CBC W/AUTO DIFF WBC: CPT

## 2020-06-02 RX ORDER — ASPIRIN 81 MG/1
81 TABLET ORAL DAILY
COMMUNITY
End: 2022-09-17 | Stop reason: SDUPTHER

## 2020-06-02 RX ORDER — CLOPIDOGREL BISULFATE 75 MG/1
75 TABLET ORAL DAILY
Qty: 90 TABLET | Refills: 1 | Status: SHIPPED
Start: 2020-06-02 | End: 2021-01-05 | Stop reason: SDUPTHER

## 2020-06-02 RX ORDER — POTASSIUM CHLORIDE 750 MG/1
10 TABLET, FILM COATED, EXTENDED RELEASE ORAL DAILY
Qty: 90 TABLET | Refills: 1 | Status: SHIPPED
Start: 2020-06-02 | End: 2021-01-05 | Stop reason: SDUPTHER

## 2020-06-02 RX ORDER — TORSEMIDE 100 MG/1
100 TABLET ORAL DAILY
Qty: 90 TABLET | Refills: 1 | Status: SHIPPED
Start: 2020-06-02 | End: 2021-04-07 | Stop reason: SINTOL

## 2020-06-02 RX ORDER — LISINOPRIL 20 MG/1
20 TABLET ORAL DAILY
Qty: 90 TABLET | Refills: 1 | Status: SHIPPED
Start: 2020-06-02 | End: 2021-01-05 | Stop reason: SDUPTHER

## 2020-06-02 RX ORDER — FLUCONAZOLE 150 MG/1
150 TABLET ORAL EVERY OTHER DAY
Qty: 15 TABLET | Refills: 0 | Status: SHIPPED | OUTPATIENT
Start: 2020-06-02 | End: 2020-06-05

## 2020-06-02 RX ORDER — ROSUVASTATIN CALCIUM 40 MG/1
40 TABLET, COATED ORAL DAILY
Qty: 90 TABLET | Refills: 1 | Status: SHIPPED
Start: 2020-06-02 | End: 2021-01-05 | Stop reason: SDUPTHER

## 2020-06-02 RX ORDER — ALBUTEROL SULFATE 90 UG/1
2 AEROSOL, METERED RESPIRATORY (INHALATION) EVERY 6 HOURS PRN
Qty: 3 INHALER | Refills: 1 | Status: SHIPPED
Start: 2020-06-02 | End: 2021-04-07 | Stop reason: SDUPTHER

## 2020-06-02 RX ORDER — IPRATROPIUM BROMIDE AND ALBUTEROL SULFATE 2.5; .5 MG/3ML; MG/3ML
1 SOLUTION RESPIRATORY (INHALATION) EVERY 4 HOURS
Qty: 180 VIAL | Refills: 5 | Status: SHIPPED
Start: 2020-06-02 | End: 2022-07-21

## 2020-06-02 RX ORDER — ISOSORBIDE DINITRATE 30 MG/1
30 TABLET ORAL DAILY
Qty: 90 TABLET | Refills: 1 | Status: SHIPPED
Start: 2020-06-02 | End: 2021-01-05 | Stop reason: SDUPTHER

## 2020-06-02 RX ORDER — EZETIMIBE 10 MG/1
10 TABLET ORAL DAILY
Qty: 90 TABLET | Refills: 1 | Status: SHIPPED
Start: 2020-06-02 | End: 2022-07-21

## 2020-06-02 RX ORDER — GLIMEPIRIDE 4 MG/1
4 TABLET ORAL
Qty: 180 TABLET | Refills: 1 | Status: SHIPPED
Start: 2020-06-02 | End: 2021-01-05 | Stop reason: SDUPTHER

## 2020-06-02 ASSESSMENT — ENCOUNTER SYMPTOMS
SHORTNESS OF BREATH: 1
EYES NEGATIVE: 1
ALLERGIC/IMMUNOLOGIC NEGATIVE: 1
WHEEZING: 1
GASTROINTESTINAL NEGATIVE: 1

## 2020-06-02 NOTE — TELEPHONE ENCOUNTER
Please clarify fluxonazole order. Order reads quantity 15, take one every other day for three days. Please clarify.

## 2020-06-02 NOTE — PROGRESS NOTES
abuse 5    Tobacco abuse counseling July second 2019    Warthin's tumor 10/2018       Past Surgical History:   Procedure Laterality Date    CORONARY ARTERY BYPASS GRAFT  2005    three vessel    CYSTOSCOPY      For enuresis    HYSTERECTOMY      Menorrhagia    TONSILLECTOMY AND ADENOIDECTOMY      as a child    TUBAL LIGATION         Social History     Socioeconomic History    Marital status:      Spouse name: Not on file    Number of children: Not on file    Years of education: Not on file    Highest education level: Not on file   Occupational History    Not on file   Social Needs    Financial resource strain: Not on file    Food insecurity     Worry: Not on file     Inability: Not on file    Transportation needs     Medical: Not on file     Non-medical: Not on file   Tobacco Use    Smoking status: Current Every Day Smoker     Packs/day: 0.50     Years: 40.00     Pack years: 20.00     Types: Cigarettes    Smokeless tobacco: Never Used   Substance and Sexual Activity    Alcohol use: Not Currently    Drug use: Never    Sexual activity: Not on file   Lifestyle    Physical activity     Days per week: 0 days     Minutes per session: 0 min    Stress: Very much   Relationships    Social connections     Talks on phone: Not on file     Gets together: Not on file     Attends Hoahaoism service: Not on file     Active member of club or organization: Not on file     Attends meetings of clubs or organizations: Not on file     Relationship status: Not on file    Intimate partner violence     Fear of current or ex partner: Not on file     Emotionally abused: Not on file     Physically abused: Not on file     Forced sexual activity: Not on file   Other Topics Concern    Not on file   Social History Narrative    Not on file        Family History   Problem Relation Age of Onset    Coronary Art Dis Mother     Diabetes Mother     Cancer Mother     COPD Father        Vitals:    06/02/20 0821   BP:

## 2020-07-01 ENCOUNTER — OFFICE VISIT (OUTPATIENT)
Dept: FAMILY MEDICINE CLINIC | Age: 55
End: 2020-07-01
Payer: COMMERCIAL

## 2020-07-01 VITALS
HEART RATE: 81 BPM | BODY MASS INDEX: 41.68 KG/M2 | OXYGEN SATURATION: 97 % | HEIGHT: 68 IN | DIASTOLIC BLOOD PRESSURE: 76 MMHG | WEIGHT: 275 LBS | SYSTOLIC BLOOD PRESSURE: 132 MMHG

## 2020-07-01 PROBLEM — J43.2 CENTRILOBULAR EMPHYSEMA (HCC): Status: ACTIVE | Noted: 2020-07-01

## 2020-07-01 PROCEDURE — 99214 OFFICE O/P EST MOD 30 MIN: CPT | Performed by: INTERNAL MEDICINE

## 2020-07-01 ASSESSMENT — ENCOUNTER SYMPTOMS
WHEEZING: 1
ALLERGIC/IMMUNOLOGIC NEGATIVE: 1
SHORTNESS OF BREATH: 1
EYES NEGATIVE: 1
GASTROINTESTINAL NEGATIVE: 1

## 2020-07-01 NOTE — PROGRESS NOTES
2019    [facility]     Warthin's tumor 10/2018       Past Surgical History:   Procedure Laterality Date    CORONARY ARTERY BYPASS GRAFT  2005    three vessel    CYSTOSCOPY      For enuresis    HYSTERECTOMY      Menorrhagia    TONSILLECTOMY AND ADENOIDECTOMY      as a child    TUBAL LIGATION         Social History     Socioeconomic History    Marital status:      Spouse name: Not on file    Number of children: Not on file    Years of education: Not on file    Highest education level: Not on file   Occupational History    Not on file   Social Needs    Financial resource strain: Not on file    Food insecurity     Worry: Not on file     Inability: Not on file    Transportation needs     Medical: Not on file     Non-medical: Not on file   Tobacco Use    Smoking status: Current Every Day Smoker     Packs/day: 0.50     Years: 40.00     Pack years: 20.00     Types: Cigarettes    Smokeless tobacco: Never Used   Substance and Sexual Activity    Alcohol use: Not Currently    Drug use: Never    Sexual activity: Not on file   Lifestyle    Physical activity     Days per week: 0 days     Minutes per session: 0 min    Stress: Very much   Relationships    Social connections     Talks on phone: Not on file     Gets together: Not on file     Attends Scientologist service: Not on file     Active member of club or organization: Not on file     Attends meetings of clubs or organizations: Not on file     Relationship status: Not on file    Intimate partner violence     Fear of current or ex partner: Not on file     Emotionally abused: Not on file     Physically abused: Not on file     Forced sexual activity: Not on file   Other Topics Concern    Not on file   Social History Narrative    Not on file        Family History   Problem Relation Age of Onset    Coronary Art Dis Mother     Diabetes Mother     Cancer Mother     COPD Father        Vitals:    07/01/20 0946   BP: 132/76   Pulse: 81 SpO2: 97%   Weight: 275 lb (124.7 kg)   Height: 5' 8\" (1.727 m)     Estimated body mass index is 41.81 kg/m² as calculated from the following:    Height as of this encounter: 5' 8\" (1.727 m). Weight as of this encounter: 275 lb (124.7 kg). Physical Exam  Constitutional:       Comments: Very obese individual in no apparent distress   HENT:      Head: Normocephalic and atraumatic. Right Ear: External ear normal.      Left Ear: External ear normal.      Nose: Nose normal.   Eyes:      Conjunctiva/sclera: Conjunctivae normal.      Pupils: Pupils are equal, round, and reactive to light. Neck:      Musculoskeletal: Normal range of motion and neck supple. Cardiovascular:      Rate and Rhythm: Normal rate and regular rhythm. Heart sounds: Normal heart sounds. Comments: 2-3+ pitting edema to the knee bilaterally  Musculoskeletal: Normal range of motion. Comments: Imitation of range of motion to both inversion and eversion of the hips   Skin:     Comments: Venous stasis changes below the mid tibia bilaterally. No evidence of cellulitis today. Neurological:      Mental Status: She is alert and oriented to person, place, and time. Psychiatric:         Behavior: Behavior normal.         Thought Content: Thought content normal.         Judgment: Judgment normal.       Kendall Philippe was seen today for diabetes. Diagnoses and all orders for this visit:    Coronary artery disease involving native coronary artery of native heart without angina pectoris    Type 2 diabetes mellitus without complication, without long-term current use of insulin (HCC)    Mixed hyperlipidemia    Lymphedema    Tobacco abuse    Centrilobular emphysema ([facility] [address]    Patient is to return in 3 months. She has to modify her diet and continue to lose weight. She is to stay on her current regimen of medications. She will need repeat lab testing including hemoglobin A1c and urine for microalbumin at her next visit.   I will speak to her pulmonologist regarding sleep study.

## 2020-10-28 ENCOUNTER — TELEPHONE (OUTPATIENT)
Dept: FAMILY MEDICINE CLINIC | Age: 55
End: 2020-10-28

## 2020-10-28 NOTE — TELEPHONE ENCOUNTER
I called the pt and advised her that Dr. Elicia Freeman wanted her to go to the Flu clinic instead of switching her appt to a vv due to bronchitis. He said that do to the severity of her condition it was important. Pt refused. She said that she will not come and hung up.

## 2020-12-10 ENCOUNTER — OFFICE VISIT (OUTPATIENT)
Dept: PRIMARY CARE CLINIC | Age: 55
End: 2020-12-10
Payer: COMMERCIAL

## 2020-12-10 VITALS
TEMPERATURE: 97.7 F | HEART RATE: 96 BPM | OXYGEN SATURATION: 93 % | WEIGHT: 274 LBS | DIASTOLIC BLOOD PRESSURE: 86 MMHG | SYSTOLIC BLOOD PRESSURE: 138 MMHG | BODY MASS INDEX: 41.52 KG/M2 | HEIGHT: 68 IN

## 2020-12-10 PROBLEM — E11.59 TYPE 2 DIABETES MELLITUS WITH CIRCULATORY DISORDER, WITHOUT LONG-TERM CURRENT USE OF INSULIN (HCC): Status: ACTIVE | Noted: 2020-06-02

## 2020-12-10 PROBLEM — G47.33 OBSTRUCTIVE SLEEP APNEA SYNDROME: Status: ACTIVE | Noted: 2020-12-10

## 2020-12-10 PROCEDURE — 99215 OFFICE O/P EST HI 40 MIN: CPT | Performed by: INTERNAL MEDICINE

## 2020-12-10 RX ORDER — MONTELUKAST SODIUM 10 MG/1
TABLET ORAL
COMMUNITY
Start: 2020-09-18 | End: 2022-05-03 | Stop reason: SDUPTHER

## 2020-12-10 RX ORDER — CARVEDILOL 6.25 MG/1
TABLET ORAL
COMMUNITY
Start: 2020-12-05 | End: 2022-05-03 | Stop reason: SDUPTHER

## 2020-12-10 RX ORDER — RANOLAZINE 500 MG/1
TABLET, EXTENDED RELEASE ORAL
COMMUNITY
Start: 2020-12-05 | End: 2022-07-21

## 2020-12-10 RX ORDER — INSULIN GLARGINE 100 [IU]/ML
INJECTION, SOLUTION SUBCUTANEOUS
Qty: 5 PEN | Refills: 3 | Status: SHIPPED
Start: 2020-12-10 | End: 2021-09-13

## 2020-12-10 RX ORDER — ERGOCALCIFEROL 1.25 MG/1
50000 CAPSULE ORAL WEEKLY
Qty: 12 CAPSULE | Refills: 1 | Status: SHIPPED
Start: 2020-12-10 | End: 2021-01-06 | Stop reason: SDUPTHER

## 2020-12-10 ASSESSMENT — ENCOUNTER SYMPTOMS
SHORTNESS OF BREATH: 1
ALLERGIC/IMMUNOLOGIC NEGATIVE: 1
EYES NEGATIVE: 1
WHEEZING: 1
GASTROINTESTINAL NEGATIVE: 1

## 2020-12-10 NOTE — PROGRESS NOTES
12/10/2020    Houston Salomon (:  1965) is a 53 y.o. female, here for evaluation of the following medical concerns:    Patient was recently hospitalized for myocardial infarction. Patient also was initially seen at [facility] and treated for an exacerbation of her COPD. She was then sent to [facility] heart catheterization was done. Currently being evaluated by pulmonary medicine for sleep apnea. Patient's blood sugars are extremely high with hemoglobin A1c of 9.5. She has had several rounds of steroid in the past which obviously may be increasing her blood sugars. Patient currently is on Metformin and Amaryl. She will need insulin to bring her blood sugars down to a better control. We discussed giving her Lantus once a day titrating the dose relatively quickly so her fasting blood sugars come between 100 125. This will allow her oral medicines to work better. If this does not work we may need to try another approach. Patient currently is not having shortness of breath although she feels very fatigued. Her other lab work indicates that she has significant vitamin D deficiency. She will be started back on 50,000 units of vitamin D3 weekly. Diabetes   Associated symptoms include fatigue, polydipsia, polyphagia and polyuria. Hyperlipidemia   Associated symptoms include shortness of breath. Hypertension   Associated symptoms include shortness of breath. Review of Systems   Constitutional: Positive for activity change and fatigue. HENT: Positive for dental problem. Eyes: Negative. Respiratory: Positive for shortness of breath and wheezing. Cardiovascular:        Recent myocardial infarction with recent heart catheterization   Gastrointestinal: Negative. Endocrine: Positive for polydipsia, polyphagia and polyuria. Morbid obesity. Significantly elevated hemoglobin A1c at 9.5. Genitourinary: Positive for enuresis.    Musculoskeletal:        Bilateral greater trochanteric bursitis. Bilateral hip pain. Lumbar pain. Skin:        Venous stasis changes of the lower extremity. Allergic/Immunologic: Negative. Neurological: Negative. Hematological: Negative. Psychiatric/Behavioral:        Significant stressors. Prior to Visit Medications    Medication Sig Taking?  Authorizing Provider   ranolazine (RANEXA) 500 MG extended release tablet TAKE 1 TABLET BY MOUTH TWICE DAILY Yes Historical Provider, MD   montelukast (SINGULAIR) 10 MG tablet  Yes Historical Provider, MD   carvedilol (COREG) 6.25 MG tablet TAKE 1 TABLET BY MOUTH TWICE DAILY Yes Historical Provider, MD   aspirin 81 MG EC tablet Take 81 mg by mouth daily Yes Historical Provider, MD   albuterol sulfate  (90 Base) MCG/ACT inhaler Inhale 2 puffs into the lungs every 6 hours as needed for Wheezing Yes Meliza Menard MD   clopidogrel (PLAVIX) 75 MG tablet Take 1 tablet by mouth daily Yes Meliza Menard MD   ezetimibe (ZETIA) 10 MG tablet Take 1 tablet by mouth daily Yes Meliza Menard MD   Fluticasone furoate-vilanterol (BREO ELLIPTA) 200-25 MCG/INH AEPB inhaler Inhale 1 puff into the lungs daily Yes Meliza Menard MD   ipratropium-albuterol (DUONEB) 0.5-2.5 (3) MG/3ML SOLN nebulizer solution Inhale 3 mLs into the lungs every 4 hours Yes Meliza Menard MD   isosorbide dinitrate (ISORDIL) 30 MG tablet Take 1 tablet by mouth daily Yes Meliza Menard MD   potassium chloride (KLOR-CON) 10 MEQ extended release tablet Take 1 tablet by mouth daily Yes Meliza Menard MD   rosuvastatin (CRESTOR) 40 MG tablet Take 1 tablet by mouth daily Yes Meliza Menard MD   torsemide (DEMADEX) 100 MG tablet Take 1 tablet by mouth daily  Patient taking differently: Take 50 mg by mouth daily  Yes Meliza Menard MD   lisinopril (PRINIVIL;ZESTRIL) 20 MG tablet Take 1 tablet by mouth daily Yes Meliza Menard MD   glimepiride (AMARYL) 4 MG tablet Take 1 tablet by mouth 2 times daily (before meals) Yes Meliza Menard MD   metFORMIN (GLUCOPHAGE) 1000 MG tablet Take 1 tablet by mouth 2 times daily (with meals) Yes Ashely Bennett MD        Allergies   Allergen Reactions    Iodides      Could not breathe, gasping, coughing       Past Medical History:   Diagnosis Date    CAD (coronary artery disease) 2005    First MI at age 35.,  CABG in 2005 triple vessel, total of 4 stents placed-last in October 2018.     COPD (chronic obstructive pulmonary disease) ([facility] 1    Enuresis age 12    HTN (hypertension) 2005    Hyperlipidemia 2005    Lumbar disc disease     Lymphedema 10/2018    Tobacco abuse 1985    Tobacco abuse counseling July second 2019    Type 2 diabetes mellitus ([facility]     Warthin's tumor 10/2018       Past Surgical History:   Procedure Laterality Date    CORONARY ARTERY BYPASS GRAFT  2005    three vessel    CYSTOSCOPY      For enuresis    HYSTERECTOMY      Menorrhagia    TONSILLECTOMY AND ADENOIDECTOMY      as a child    TUBAL LIGATION         Social History     Socioeconomic History    Marital status:      Spouse name: Not on file    Number of children: Not on file    Years of education: Not on file    Highest education level: Not on file   Occupational History    Not on file   Social Needs    Financial resource strain: Not on file    Food insecurity     Worry: Not on file     Inability: Not on file    Transportation needs     Medical: Not on file     Non-medical: Not on file   Tobacco Use    Smoking status: Current Every Day Smoker     Packs/day: 0.50     Years: 40.00     Pack years: 20.00     Types: Cigarettes    Smokeless tobacco: Never Used   Substance and Sexual Activity    Alcohol use: Not Currently    Drug use: Never    Sexual activity: Not on file   Lifestyle    Physical activity     Days per week: 0 days     Minutes per session: 0 min    Stress: Very much   Relationships    Social connections     Talks on phone: Not on file     Gets together: Not on file     Attends Caodaism service: Not on file     Active member of club or organization: Not on file     Attends meetings of clubs or organizations: Not on file     Relationship status: Not on file    Intimate partner violence     Fear of current or ex partner: Not on file     Emotionally abused: Not on file     Physically abused: Not on file     Forced sexual activity: Not on file   Other Topics Concern    Not on file   Social History Narrative    Not on file        Family History   Problem Relation Age of Onset    Coronary Art Dis Mother     Diabetes Mother     Cancer Mother     COPD Father        Vitals:    12/10/20 1123   BP: 138/86   Pulse: 96   Temp: 97.7 °F (36.5 °C)   SpO2: 93%   Weight: 274 lb (124.3 kg)   Height: 5' 8\" (1.727 m)     Estimated body mass index is 41.66 kg/m² as calculated from the following:    Height as of this encounter: 5' 8\" (1.727 m). Weight as of this encounter: 274 lb (124.3 kg). Physical Exam  Constitutional:       Comments: Very obese individual in no apparent distress   HENT:      Head: Normocephalic and atraumatic. Right Ear: External ear normal.      Left Ear: External ear normal.      Nose: Nose normal.   Eyes:      Conjunctiva/sclera: Conjunctivae normal.      Pupils: Pupils are equal, round, and reactive to light. Neck:      Musculoskeletal: Normal range of motion and neck supple. Cardiovascular:      Rate and Rhythm: Normal rate and regular rhythm. Heart sounds: Normal heart sounds. Comments: 2-3+ pitting edema to the knee bilaterally  Musculoskeletal: Normal range of motion. Comments: Imitation of range of motion to both inversion and eversion of the hips   Skin:     Comments: Venous stasis changes below the mid tibia bilaterally. No evidence of cellulitis today. Neurological:      Mental Status: She is alert and oriented to person, place, and time. Psychiatric:         Behavior: Behavior normal.         Thought Content:  Thought content normal. Judgment: Judgment normal.       Cristobal Stroud was seen today for follow-up from hospital.    Diagnoses and all orders for this visit:    Coronary artery disease involving native coronary artery of native heart without angina pectoris    Centrilobular emphysema (HCC)    Mixed hyperlipidemia    Tobacco abuse    Obstructive sleep apnea syndrome    Other orders  -     insulin glargine (LANTUS SOLOSTAR) 100 UNIT/ML injection pen; !0 units nightly and increase by 5 units every 3 days until -125  -     Insulin Pen Needle 32G X 5 MM MISC; 1 each by Does not apply route daily  -     vitamin D (ERGOCALCIFEROL) 1.25 MG (61700 UT) CAPS capsule; Take 1 capsule by mouth once a week    Patient is to be seen back in 1 month. Lantus is started and gradually titrated. She is to let me know within the next 3 to 4 days when she starts Lantus what her blood sugars are doing we may need to titrate more quickly. I will speak to her cardiologist after I view the heart catheterization report. Patient would benefit from PCSK9 inhibitors because her LDL is not at goal despite rosuvastatin at full dose and Zetia.

## 2021-01-05 ENCOUNTER — OFFICE VISIT (OUTPATIENT)
Dept: FAMILY MEDICINE CLINIC | Age: 56
End: 2021-01-05
Payer: COMMERCIAL

## 2021-01-05 VITALS
HEART RATE: 80 BPM | RESPIRATION RATE: 16 BRPM | WEIGHT: 283 LBS | SYSTOLIC BLOOD PRESSURE: 110 MMHG | OXYGEN SATURATION: 97 % | TEMPERATURE: 97.9 F | DIASTOLIC BLOOD PRESSURE: 60 MMHG | BODY MASS INDEX: 43.03 KG/M2

## 2021-01-05 DIAGNOSIS — J43.2 CENTRILOBULAR EMPHYSEMA (HCC): ICD-10-CM

## 2021-01-05 DIAGNOSIS — E78.2 MIXED HYPERLIPIDEMIA: ICD-10-CM

## 2021-01-05 DIAGNOSIS — I25.10 CORONARY ARTERY DISEASE INVOLVING NATIVE CORONARY ARTERY OF NATIVE HEART WITHOUT ANGINA PECTORIS: ICD-10-CM

## 2021-01-05 DIAGNOSIS — E11.59 TYPE 2 DIABETES MELLITUS WITH OTHER CIRCULATORY COMPLICATION, WITHOUT LONG-TERM CURRENT USE OF INSULIN (HCC): Primary | ICD-10-CM

## 2021-01-05 DIAGNOSIS — E55.9 VITAMIN D DEFICIENCY: ICD-10-CM

## 2021-01-05 DIAGNOSIS — I89.0 LYMPHEDEMA: ICD-10-CM

## 2021-01-05 PROCEDURE — 99214 OFFICE O/P EST MOD 30 MIN: CPT | Performed by: INTERNAL MEDICINE

## 2021-01-05 RX ORDER — CLOPIDOGREL BISULFATE 75 MG/1
75 TABLET ORAL DAILY
Qty: 90 TABLET | Refills: 1 | Status: SHIPPED
Start: 2021-01-05 | End: 2021-06-29 | Stop reason: SDUPTHER

## 2021-01-05 RX ORDER — GLIMEPIRIDE 4 MG/1
4 TABLET ORAL
Qty: 180 TABLET | Refills: 1 | Status: SHIPPED
Start: 2021-01-05 | End: 2021-06-02

## 2021-01-05 RX ORDER — ROSUVASTATIN CALCIUM 40 MG/1
40 TABLET, COATED ORAL DAILY
Qty: 90 TABLET | Refills: 1 | Status: SHIPPED
Start: 2021-01-05 | End: 2021-06-14

## 2021-01-05 RX ORDER — ISOSORBIDE DINITRATE 30 MG/1
30 TABLET ORAL DAILY
Qty: 90 TABLET | Refills: 1 | Status: SHIPPED
Start: 2021-01-05 | End: 2021-06-02

## 2021-01-05 RX ORDER — FUROSEMIDE 40 MG/1
40 TABLET ORAL DAILY
COMMUNITY
End: 2021-04-07 | Stop reason: ALTCHOICE

## 2021-01-05 RX ORDER — ZINC GLUCONATE 50 MG
50 TABLET ORAL DAILY
COMMUNITY
End: 2021-07-13

## 2021-01-05 RX ORDER — LISINOPRIL 20 MG/1
20 TABLET ORAL DAILY
Qty: 90 TABLET | Refills: 1 | Status: SHIPPED
Start: 2021-01-05 | End: 2021-06-02

## 2021-01-05 RX ORDER — POTASSIUM CHLORIDE 750 MG/1
10 TABLET, FILM COATED, EXTENDED RELEASE ORAL DAILY
Qty: 90 TABLET | Refills: 1 | Status: SHIPPED
Start: 2021-01-05 | End: 2021-07-13

## 2021-01-05 ASSESSMENT — PATIENT HEALTH QUESTIONNAIRE - PHQ9
2. FEELING DOWN, DEPRESSED OR HOPELESS: 0
1. LITTLE INTEREST OR PLEASURE IN DOING THINGS: 0
SUM OF ALL RESPONSES TO PHQ QUESTIONS 1-9: 0
SUM OF ALL RESPONSES TO PHQ QUESTIONS 1-9: 0

## 2021-01-05 ASSESSMENT — ENCOUNTER SYMPTOMS
WHEEZING: 0
EYES NEGATIVE: 1
GASTROINTESTINAL NEGATIVE: 1
ALLERGIC/IMMUNOLOGIC NEGATIVE: 1
SHORTNESS OF BREATH: 0

## 2021-01-05 NOTE — PROGRESS NOTES
2021    Salvador Grullon (:  1965) is a 63 y.o. female, here for evaluation of the following medical concerns:    Patient has gained a fair amount of weight since last visit. She is only been taking her Lasix intermittently. She states Bumex makes her ill. She did not quite understand the instructions regarding Lantus dosing. I told her to increase her insulin every 3 days x 3 units until fasting blood sugar of 100-125 is reached. She went to a 13 unit dose and kept it there. Her blood sugars only twice have been below 200. She and I discussed this again today and I did give her written instructions. I have also discussed with her the correct use of her Lasix. I told her to take the Lasix every day until her dry weight is down by 10 pounds from today. She would then consider that her dry weight and take Lasix only on the days that she would gain more than 3 pounds. Her blood pressure is relatively tightly controlled. She states that she did finish her vitamin D dosing and we will recheck this again today. She did see her pulmonologist previously and did have sleep study done in December but she has not followed up yet and has not started CPAP. This may help quite a bit in terms of her cardiac and respiratory symptoms. She has not had any recent exacerbations of her COPD and has not been on prednisone recently. Diabetes  Associated symptoms include fatigue, polydipsia, polyphagia and polyuria. Hyperlipidemia  Pertinent negatives include no shortness of breath. Hypertension  Pertinent negatives include no shortness of breath. Other  Associated symptoms include fatigue. Review of Systems   Constitutional: Positive for activity change and fatigue. HENT: Positive for dental problem. Eyes: Negative. Respiratory: Negative for shortness of breath and wheezing.     Cardiovascular:        Recent myocardial infarction with recent heart catheterization   Gastrointestinal: Negative. Endocrine: Positive for polydipsia, polyphagia and polyuria. Morbid obesity. Significantly elevated hemoglobin A1c at 9.5. Genitourinary: Positive for enuresis. Musculoskeletal:        Improved greater trochanteric pain. Occasional lumbar discomfort. Skin:        Venous stasis changes of the lower extremity. Allergic/Immunologic: Negative. Neurological: Negative. Hematological: Negative. Psychiatric/Behavioral:        Work stressors. Some decrease and social stressors with the death of her mother and father over the last year and a half. There was quite a bit of caretaking responsibilities that she had. Prior to Visit Medications    Medication Sig Taking?  Authorizing Provider   zinc gluconate 50 MG tablet Take 50 mg by mouth daily Yes Historical Provider, MD   furosemide (LASIX) 40 MG tablet Take 40 mg by mouth daily Yes Historical Provider, MD   ranolazine (RANEXA) 500 MG extended release tablet TAKE 1 TABLET BY MOUTH TWICE DAILY Yes Historical Provider, MD   montelukast (SINGULAIR) 10 MG tablet  Yes Historical Provider, MD   carvedilol (COREG) 6.25 MG tablet TAKE 1 TABLET BY MOUTH TWICE DAILY Yes Historical Provider, MD   insulin glargine (LANTUS SOLOSTAR) 100 UNIT/ML injection pen !0 units nightly and increase by 5 units every 3 days until -125  Patient taking differently: Taking 13 units daily Yes Belkis Foreman MD   vitamin D (ERGOCALCIFEROL) 1.25 MG (60036 UT) CAPS capsule Take 1 capsule by mouth once a week Yes Belkis Foreman MD   aspirin 81 MG EC tablet Take 81 mg by mouth daily Yes Historical Provider, MD   albuterol sulfate  (90 Base) MCG/ACT inhaler Inhale 2 puffs into the lungs every 6 hours as needed for Wheezing Yes Belkis Foreman MD   clopidogrel (PLAVIX) 75 MG tablet Take 1 tablet by mouth daily Yes Belkis Foreman MD   ezetimibe (ZETIA) 10 MG tablet Take 1 tablet by mouth daily Yes Belkis Foreman MD   Fluticasone furoate-vilanterol (BREO ELLIPTA) 200-25 MCG/INH AEPB inhaler Inhale 1 puff into the lungs daily Yes Lambert Rose MD   isosorbide dinitrate (ISORDIL) 30 MG tablet Take 1 tablet by mouth daily Yes Lambert Rose MD   potassium chloride (KLOR-CON) 10 MEQ extended release tablet Take 1 tablet by mouth daily Yes Lambert Rose MD   rosuvastatin (CRESTOR) 40 MG tablet Take 1 tablet by mouth daily Yes Lambert Rose MD   lisinopril (PRINIVIL;ZESTRIL) 20 MG tablet Take 1 tablet by mouth daily Yes Lambert Rose MD   glimepiride (AMARYL) 4 MG tablet Take 1 tablet by mouth 2 times daily (before meals) Yes Lambert Rose MD   metFORMIN (GLUCOPHAGE) 1000 MG tablet Take 1 tablet by mouth 2 times daily (with meals) Yes Lambert Rose MD   Insulin Pen Needle 32G X 5 MM MISC 1 each by Does not apply route daily  Lambert Rose MD   ipratropium-albuterol (DUONEB) 0.5-2.5 (3) MG/3ML SOLN nebulizer solution Inhale 3 mLs into the lungs every 4 hours  Lambert Rose MD   torsemide (DEMADEX) 100 MG tablet Take 1 tablet by mouth daily  Patient not taking: Reported on 1/5/2021  Lambert Rose MD        Allergies   Allergen Reactions    Iodides      Could not breathe, gasping, coughing       Past Medical History:   Diagnosis Date    CAD (coronary artery disease) 2005    First MI at age 35.,  CABG in 2005 triple vessel, total of 4 stents placedlast in October 2018.     COPD (chronic obstructive pulmonary disease) ([facility] 1    Enuresis age 12    HTN (hypertension) 2005    Hyperlipidemia 2005    Lumbar disc disease     Lymphedema 10/2018    Tobacco abuse 1985    Tobacco abuse counseling July second 2019    Type 2 diabetes mellitus ([facility] [address] [facility]     Warthin's tumor 10/2018       Past Surgical History:   Procedure Laterality Date    CARDIAC CATHETERIZATION  12/2020    CORONARY ARTERY BYPASS GRAFT  2005    three vessel    CYSTOSCOPY      For enuresis    HYSTERECTOMY      Menorrhagia    TONSILLECTOMY AND ADENOIDECTOMY      as a child    TUBAL LIGATION Social History     Socioeconomic History    Marital status:      Spouse name: Not on file    Number of children: Not on file    Years of education: Not on file    Highest education level: Not on file   Occupational History    Not on file   Social Needs    Financial resource strain: Not on file    Food insecurity     Worry: Not on file     Inability: Not on file    Transportation needs     Medical: Not on file     Non-medical: Not on file   Tobacco Use    Smoking status: Former Smoker     Packs/day: 0.50     Years: 40.00     Pack years: 20.00     Types: Cigarettes    Smokeless tobacco: Never Used   Substance and Sexual Activity    Alcohol use: Not Currently    Drug use: Never    Sexual activity: Not on file   Lifestyle    Physical activity     Days per week: 0 days     Minutes per session: 0 min    Stress: Very much   Relationships    Social connections     Talks on phone: Not on file     Gets together: Not on file     Attends Scientologist service: Not on file     Active member of club or organization: Not on file     Attends meetings of clubs or organizations: Not on file     Relationship status: Not on file    Intimate partner violence     Fear of current or ex partner: Not on file     Emotionally abused: Not on file     Physically abused: Not on file     Forced sexual activity: Not on file   Other Topics Concern    Not on file   Social History Narrative    Not on file        Family History   Problem Relation Age of Onset    Coronary Art Dis Mother     Diabetes Mother     Cancer Mother     COPD Father        Vitals:    01/05/21 1042   BP: 110/60   Pulse: 80   Resp: 16   Temp: 97.9 °F (36.6 °C)   SpO2: 97%   Weight: 283 lb (128.4 kg)     Estimated body mass index is 43.03 kg/m² as calculated from the following:    Height as of 12/10/20: 5' 8\" (1.727 m). Weight as of this encounter: 283 lb (128.4 kg).     Physical Exam  Constitutional:       Comments: Very obese individual in no apparent distress   HENT:      Head: Normocephalic and atraumatic. Right Ear: External ear normal.      Left Ear: External ear normal.      Nose: Nose normal.   Eyes:      Conjunctiva/sclera: Conjunctivae normal.      Pupils: Pupils are equal, round, and reactive to light. Neck:      Musculoskeletal: Normal range of motion and neck supple. Cardiovascular:      Rate and Rhythm: Normal rate and regular rhythm. Heart sounds: Normal heart sounds. Comments: 2-3+ pitting edema to the knee bilaterally  Musculoskeletal: Normal range of motion. Comments: Imitation of range of motion to both inversion and eversion of the hips   Skin:     Comments: Venous stasis changes below the mid tibia bilaterally. No evidence of cellulitis today. Neurological:      Mental Status: She is alert and oriented to person, place, and time. Psychiatric:         Behavior: Behavior normal.         Thought Content: Thought content normal.         Judgment: Judgment normal.       Essence Villegas was seen today for other. Diagnoses and all orders for this visit:    Type 2 diabetes mellitus with other circulatory complication, without long-term current use of insulin (HCC)    Coronary artery disease involving native coronary artery of native heart without angina pectoris  -     rosuvastatin (CRESTOR) 40 MG tablet; Take 1 tablet by mouth daily  -     Comprehensive Metabolic Panel; Future  -     MAGNESIUM; Future    Centrilobular emphysema (HCC)    Mixed hyperlipidemia    Lymphedema  -     Comprehensive Metabolic Panel; Future  -     MAGNESIUM; Future    Vitamin D deficiency  -     Vitamin D 25 Hydroxy; Future    Other orders  -     clopidogrel (PLAVIX) 75 MG tablet; Take 1 tablet by mouth daily  -     Fluticasone furoate-vilanterol (BREO ELLIPTA) 200-25 MCG/INH AEPB inhaler; Inhale 1 puff into the lungs daily  -     glimepiride (AMARYL) 4 MG tablet;  Take 1 tablet by mouth 2 times daily (before meals)  -     isosorbide dinitrate (ISORDIL) 30 MG tablet; Take 1 tablet by mouth daily  -     lisinopril (PRINIVIL;ZESTRIL) 20 MG tablet; Take 1 tablet by mouth daily  -     metFORMIN (GLUCOPHAGE) 1000 MG tablet; Take 1 tablet by mouth 2 times daily (with meals)  -     potassium chloride (KLOR-CON) 10 MEQ extended release tablet; Take 1 tablet by mouth daily    Instructions on titration of insulin are written out for the patient. Patient will have vitamin D level and a CMP today. I have also instructed her how to take Lasix. She is to be seen back in 3 months. Hemoglobin A1c at that time. She may need to continue vitamin D supplementation depending on today's level.

## 2021-01-06 LAB
ALBUMIN SERPL-MCNC: 3.9 G/DL (ref 3.5–5.2)
ALP BLD-CCNC: 102 U/L (ref 35–104)
ALT SERPL-CCNC: 14 U/L (ref 0–32)
ANION GAP SERPL CALCULATED.3IONS-SCNC: 13 MMOL/L (ref 7–16)
AST SERPL-CCNC: 21 U/L (ref 0–31)
BILIRUB SERPL-MCNC: 0.2 MG/DL (ref 0–1.2)
BUN BLDV-MCNC: 20 MG/DL (ref 6–20)
CALCIUM SERPL-MCNC: 10 MG/DL (ref 8.6–10.2)
CHLORIDE BLD-SCNC: 99 MMOL/L (ref 98–107)
CO2: 27 MMOL/L (ref 22–29)
CREAT SERPL-MCNC: 0.7 MG/DL (ref 0.5–1)
GFR AFRICAN AMERICAN: >60
GFR NON-AFRICAN AMERICAN: >60 ML/MIN/1.73
GLUCOSE BLD-MCNC: 194 MG/DL (ref 74–99)
MAGNESIUM: 1.8 MG/DL (ref 1.6–2.6)
POTASSIUM SERPL-SCNC: 4.4 MMOL/L (ref 3.5–5)
SODIUM BLD-SCNC: 139 MMOL/L (ref 132–146)
TOTAL PROTEIN: 6.8 G/DL (ref 6.4–8.3)
VITAMIN D 25-HYDROXY: 14 NG/ML (ref 30–100)

## 2021-01-06 RX ORDER — ERGOCALCIFEROL 1.25 MG/1
50000 CAPSULE ORAL WEEKLY
Qty: 12 CAPSULE | Refills: 1 | Status: SHIPPED
Start: 2021-01-06 | End: 2021-04-08 | Stop reason: SDUPTHER

## 2021-04-07 ENCOUNTER — OFFICE VISIT (OUTPATIENT)
Dept: FAMILY MEDICINE CLINIC | Age: 56
End: 2021-04-07
Payer: COMMERCIAL

## 2021-04-07 VITALS
BODY MASS INDEX: 43.64 KG/M2 | OXYGEN SATURATION: 96 % | DIASTOLIC BLOOD PRESSURE: 60 MMHG | SYSTOLIC BLOOD PRESSURE: 100 MMHG | TEMPERATURE: 97.8 F | WEIGHT: 287 LBS | HEART RATE: 80 BPM

## 2021-04-07 DIAGNOSIS — E55.9 VITAMIN D DEFICIENCY: ICD-10-CM

## 2021-04-07 DIAGNOSIS — E78.2 MIXED HYPERLIPIDEMIA: ICD-10-CM

## 2021-04-07 DIAGNOSIS — J43.2 CENTRILOBULAR EMPHYSEMA (HCC): ICD-10-CM

## 2021-04-07 DIAGNOSIS — J44.1 ACUTE EXACERBATION OF CHRONIC OBSTRUCTIVE PULMONARY DISEASE (COPD) (HCC): ICD-10-CM

## 2021-04-07 DIAGNOSIS — E11.59 TYPE 2 DIABETES MELLITUS WITH OTHER CIRCULATORY COMPLICATION, WITHOUT LONG-TERM CURRENT USE OF INSULIN (HCC): Primary | ICD-10-CM

## 2021-04-07 DIAGNOSIS — I25.10 CORONARY ARTERY DISEASE INVOLVING NATIVE CORONARY ARTERY OF NATIVE HEART WITHOUT ANGINA PECTORIS: ICD-10-CM

## 2021-04-07 LAB
ALBUMIN SERPL-MCNC: 3.7 G/DL (ref 3.5–5.2)
ALP BLD-CCNC: 105 U/L (ref 35–104)
ALT SERPL-CCNC: 6 U/L (ref 0–32)
ANION GAP SERPL CALCULATED.3IONS-SCNC: 16 MMOL/L (ref 7–16)
AST SERPL-CCNC: 15 U/L (ref 0–31)
BILIRUB SERPL-MCNC: 0.3 MG/DL (ref 0–1.2)
BUN BLDV-MCNC: 22 MG/DL (ref 6–20)
CALCIUM SERPL-MCNC: 9.1 MG/DL (ref 8.6–10.2)
CHLORIDE BLD-SCNC: 98 MMOL/L (ref 98–107)
CO2: 24 MMOL/L (ref 22–29)
CREAT SERPL-MCNC: 0.6 MG/DL (ref 0.5–1)
GFR AFRICAN AMERICAN: >60
GFR NON-AFRICAN AMERICAN: >60 ML/MIN/1.73
GLUCOSE BLD-MCNC: 160 MG/DL (ref 74–99)
HBA1C MFR BLD: 8.7 %
MAGNESIUM: 1.9 MG/DL (ref 1.6–2.6)
POTASSIUM SERPL-SCNC: 4.5 MMOL/L (ref 3.5–5)
SODIUM BLD-SCNC: 138 MMOL/L (ref 132–146)
TOTAL PROTEIN: 7.1 G/DL (ref 6.4–8.3)
VITAMIN D 25-HYDROXY: 16 NG/ML (ref 30–100)

## 2021-04-07 PROCEDURE — 3052F HG A1C>EQUAL 8.0%<EQUAL 9.0%: CPT | Performed by: INTERNAL MEDICINE

## 2021-04-07 PROCEDURE — 99214 OFFICE O/P EST MOD 30 MIN: CPT | Performed by: INTERNAL MEDICINE

## 2021-04-07 PROCEDURE — 83036 HEMOGLOBIN GLYCOSYLATED A1C: CPT | Performed by: INTERNAL MEDICINE

## 2021-04-07 RX ORDER — FUROSEMIDE 80 MG
80 TABLET ORAL DAILY
Qty: 30 TABLET | Refills: 3 | Status: SHIPPED
Start: 2021-04-07 | End: 2021-07-13

## 2021-04-07 RX ORDER — ALBUTEROL SULFATE 90 UG/1
2 AEROSOL, METERED RESPIRATORY (INHALATION) EVERY 6 HOURS PRN
Qty: 3 INHALER | Refills: 1 | Status: SHIPPED
Start: 2021-04-07 | End: 2021-12-22 | Stop reason: SDUPTHER

## 2021-04-07 RX ORDER — PREDNISONE 20 MG/1
20 TABLET ORAL DAILY
Qty: 10 TABLET | Refills: 0 | Status: SHIPPED | OUTPATIENT
Start: 2021-04-07 | End: 2021-04-17

## 2021-04-07 RX ORDER — AMOXICILLIN AND CLAVULANATE POTASSIUM 875; 125 MG/1; MG/1
1 TABLET, FILM COATED ORAL 2 TIMES DAILY
Qty: 20 TABLET | Refills: 0 | Status: SHIPPED | OUTPATIENT
Start: 2021-04-07 | End: 2021-04-17

## 2021-04-07 ASSESSMENT — ENCOUNTER SYMPTOMS
WHEEZING: 0
GASTROINTESTINAL NEGATIVE: 1
EYES NEGATIVE: 1
SHORTNESS OF BREATH: 0
ALLERGIC/IMMUNOLOGIC NEGATIVE: 1

## 2021-04-07 NOTE — PROGRESS NOTES
2021    Maldonado Calzada (:  1965) is a 63 y.o. female, here for evaluation of the following medical concerns:    Patient's weight is still up and she is still having problems with peripheral lymphedema. She has difficulty wearing support hose. Patient has been taking her vitamin D regularly. Patient did need disability forms completed so she could use an inhaler at work. Patient did receive one of her 2 Covid vaccines. Of urged her to get the second. After the first injection she states she developed postnasal drip and some \"cold-like symptoms\". She has had difficulty with her breathing because of this. She denies fever, chills, sweats. There is no night sweats. There is been no orthopnea or PND. She is denying any chest pain or chest pressure consistent with active coronary disease. Patient has not followed regularly with her pulmonologist because he is often not available when she has the ability to go to his office. This patient definitely needs a full-time pulmonologist and I will refer her to a local pulmonologist.  Patient states that when she got to 40 units of insulin she had stopped escalating the dose because she states that the injection stung. Her hemoglobin A1c is still elevated and I have asked her to not stop her titration of the dose until 6 fasting blood sugars are between 101 140. She does have written instructions regarding this. She has vitamin D deficient and I have given her medication to supplement. We will check a vitamin D level today. Other  Associated symptoms include fatigue. Diabetes  Associated symptoms include fatigue, polydipsia, polyphagia and polyuria. Hyperlipidemia  Pertinent negatives include no shortness of breath. Hypertension  Pertinent negatives include no shortness of breath. Review of Systems   Constitutional: Positive for activity change and fatigue. HENT: Positive for dental problem. Eyes: Negative.     Respiratory: Negative for shortness of breath and wheezing. Cardiovascular:        Recent myocardial infarction with recent heart catheterization   Gastrointestinal: Negative. Endocrine: Positive for polydipsia, polyphagia and polyuria. Morbid obesity. Significantly elevated hemoglobin A1c at 9.5. Genitourinary: Positive for enuresis. Musculoskeletal:        Improved greater trochanteric pain. Occasional lumbar discomfort. Skin:        Venous stasis changes of the lower extremity. Allergic/Immunologic: Negative. Neurological: Negative. Hematological: Negative. Psychiatric/Behavioral:        Work stressors. Some decrease and social stressors with the death of her mother and father over the last year and a half. There was quite a bit of caretaking responsibilities that she had. Prior to Visit Medications    Medication Sig Taking?  Authorizing Provider   vitamin D (ERGOCALCIFEROL) 1.25 MG (04020 UT) CAPS capsule Take 1 capsule by mouth once a week Yes Tomeka Juárez MD   zinc gluconate 50 MG tablet Take 50 mg by mouth daily Yes Historical Provider, MD   clopidogrel (PLAVIX) 75 MG tablet Take 1 tablet by mouth daily Yes Tomeka Juárez MD   Fluticasone furoate-vilanterol (BREO ELLIPTA) 200-25 MCG/INH AEPB inhaler Inhale 1 puff into the lungs daily Yes Tomeka Juárez MD   glimepiride (AMARYL) 4 MG tablet Take 1 tablet by mouth 2 times daily (before meals) Yes Tomeka Juárez MD   isosorbide dinitrate (ISORDIL) 30 MG tablet Take 1 tablet by mouth daily Yes Tomeka Juárez MD   lisinopril (PRINIVIL;ZESTRIL) 20 MG tablet Take 1 tablet by mouth daily Yes Tomeka Juárez MD   metFORMIN (GLUCOPHAGE) 1000 MG tablet Take 1 tablet by mouth 2 times daily (with meals) Yes Tomeka Juárez MD   potassium chloride (KLOR-CON) 10 MEQ extended release tablet Take 1 tablet by mouth daily Yes Tomeka Juárez MD   rosuvastatin (CRESTOR) 40 MG tablet Take 1 tablet by mouth daily Yes Tomeka Juárez MD   ranolazine (RANEXA) 500 MG extended release tablet TAKE 1 TABLET BY MOUTH TWICE DAILY Yes Historical Provider, MD   montelukast (SINGULAIR) 10 MG tablet  Yes Historical Provider, MD   carvedilol (COREG) 6.25 MG tablet TAKE 1 TABLET BY MOUTH TWICE DAILY Yes Historical Provider, MD   insulin glargine (LANTUS SOLOSTAR) 100 UNIT/ML injection pen !0 units nightly and increase by 5 units every 3 days until -125  Patient taking differently: Taking 40 units daily Yes Kelli Kauffman MD   aspirin 81 MG EC tablet Take 81 mg by mouth daily Yes Historical Provider, MD   albuterol sulfate  (90 Base) MCG/ACT inhaler Inhale 2 puffs into the lungs every 6 hours as needed for Wheezing Yes Kelli Kauffman MD   ezetimibe (ZETIA) 10 MG tablet Take 1 tablet by mouth daily Yes Kelli Kauffman MD   ipratropium-albuterol (DUONEB) 0.5-2.5 (3) MG/3ML SOLN nebulizer solution Inhale 3 mLs into the lungs every 4 hours Yes Kelli Kauffman MD   torsemide (DEMADEX) 100 MG tablet Take 1 tablet by mouth daily Yes Kelli Kauffman MD   furosemide (LASIX) 40 MG tablet Take 40 mg by mouth daily  Historical Provider, MD   Insulin Pen Needle 32G X 5 MM MISC 1 each by Does not apply route daily  Kelli Kauffman MD        Allergies   Allergen Reactions    Iodides      Could not breathe, gasping, coughing       Past Medical History:   Diagnosis Date    CAD (coronary artery disease) 2005    First MI at age 35.,  CABG in 2005 triple vessel, total of 4 stents placedlast in October 2018.     COPD (chronic obstructive pulmonary disease) ([facility] 1    Enuresis age 13    HTN (hypertension) 2005    Hyperlipidemia 2005    Lumbar disc disease     Lymphedema 10/2018    Tobacco abuse 1985    Tobacco abuse counseling July second 2019    Type 2 diabetes mellitus ([facility]     Warthin's tumor 10/2018       Past Surgical History:   Procedure Laterality Date    CARDIAC CATHETERIZATION  12/2020    CORONARY ARTERY BYPASS GRAFT  2005    three vessel    CYSTOSCOPY      For enuresis    HYSTERECTOMY      Menorrhagia    TONSILLECTOMY AND ADENOIDECTOMY      as a child    TUBAL LIGATION         Social History     Socioeconomic History    Marital status:      Spouse name: Not on file    Number of children: Not on file    Years of education: Not on file    Highest education level: Not on file   Occupational History    Not on file   Social Needs    Financial resource strain: Not on file    Food insecurity     Worry: Not on file     Inability: Not on file    Transportation needs     Medical: Not on file     Non-medical: Not on file   Tobacco Use    Smoking status: Former Smoker     Packs/day: 0.50     Years: 40.00     Pack years: 20.00     Types: Cigarettes    Smokeless tobacco: Never Used   Substance and Sexual Activity    Alcohol use: Not Currently    Drug use: Never    Sexual activity: Not on file   Lifestyle    Physical activity     Days per week: 0 days     Minutes per session: 0 min    Stress: Very much   Relationships    Social connections     Talks on phone: Not on file     Gets together: Not on file     Attends [employer] service: Not on file     Active member of club or organization: Not on file     Attends meetings of clubs or organizations: Not on file     Relationship status: Not on file    Intimate partner violence     Fear of current or ex partner: Not on file     Emotionally abused: Not on file     Physically abused: Not on file     Forced sexual activity: Not on file   Other Topics Concern    Not on file   Social History Narrative    Not on file        Family History   Problem Relation Age of Onset    Coronary Art Dis Mother     Diabetes Mother     Cancer Mother     COPD Father        Vitals:    04/07/21 0828   BP: 100/60   Pulse: 80   Temp: 97.8 °F (36.6 °C)   SpO2: 96%   Weight: 287 lb (130.2 kg)     Estimated body mass index is 43.64 kg/m² as calculated from the following:    Height as of 12/10/20: 5' 8\" (1.727 m).     Weight as of this encounter: 287 lb (130.2 kg). Physical Exam  Constitutional:       Comments: Very obese individual in no apparent distress   HENT:      Head: Normocephalic and atraumatic. Right Ear: External ear normal.      Left Ear: External ear normal.      Nose: Nose normal.   Eyes:      Conjunctiva/sclera: Conjunctivae normal.      Pupils: Pupils are equal, round, and reactive to light. Neck:      Musculoskeletal: Normal range of motion and neck supple. Cardiovascular:      Rate and Rhythm: Normal rate and regular rhythm. Heart sounds: Normal heart sounds. Comments: 2-3+ pitting edema to the knee bilaterally  Musculoskeletal: Normal range of motion. Comments: Imitation of range of motion to both inversion and eversion of the hips   Skin:     Comments: Venous stasis changes below the mid tibia bilaterally. No evidence of cellulitis today. Neurological:      Mental Status: She is alert and oriented to person, place, and time. Psychiatric:         Behavior: Behavior normal.         Thought Content: Thought content normal.         Judgment: Judgment normal.       Staci Christiansen was seen today for diabetes, hypertension and hyperlipidemia. Diagnoses and all orders for this visit:    Type 2 diabetes mellitus with other circulatory complication, without long-term current use of insulin (HCC)  -     POCT glycosylated hemoglobin (Hb A1C)    Coronary artery disease involving native coronary artery of native heart without angina pectoris    Centrilobular emphysema (HCC)  -     AFL ([facility]) - Colon Found, DO, Pulmonary ,[place]    Acute [facility] of [facility] disease [facility]    Mixed hyperlipidemia  -     Comprehensive Metabolic Panel; Future  -     MAGNESIUM; Future    Vitamin D deficiency  -     Vitamin D 25 Hydroxy; Future    Other orders  -     albuterol sulfate  (90 Base) MCG/ACT inhaler;  Inhale 2 puffs into the lungs every 6 hours as needed for Wheezing  - furosemide (LASIX) 80 MG tablet; Take 1 tablet by mouth daily  -     amoxicillin-clavulanate (AUGMENTIN) 875-125 MG per tablet; Take 1 tablet by mouth 2 times daily for 10 days  -     predniSONE (DELTASONE) 20 MG tablet; Take 1 tablet by mouth daily for 10 days    Patient is given Augmentin 875 twice daily for 10-day course along with low-dose prednisone. She states that she is having wheezing hearing much today. Questions. I believe this is probably mostly upper airway and some reactive lower airway disease. We will send her to a pulmonologist.  We will get labs today. Insert important that she continue to titrate her insulin dosing. Patient states that she is unable to tolerate Demadex. We will place her on Lasix.   States she is able to tolerate this but she states that she has done better with this medication in the past.

## 2021-04-08 DIAGNOSIS — E55.9 VITAMIN D DEFICIENCY: Primary | ICD-10-CM

## 2021-04-08 RX ORDER — ERGOCALCIFEROL 1.25 MG/1
50000 CAPSULE ORAL
Qty: 24 CAPSULE | Refills: 1 | Status: SHIPPED
Start: 2021-04-08 | End: 2022-09-02 | Stop reason: SDUPTHER

## 2021-05-06 ENCOUNTER — TELEPHONE (OUTPATIENT)
Dept: ADMINISTRATIVE | Age: 56
End: 2021-05-06

## 2021-05-06 NOTE — TELEPHONE ENCOUNTER
Patient is being discharged from [place] [address] today or tomorrow- [facility] in [place]-COPD- asthma   They want hosp follow up in 5 days. No open slots. Please call Jesse at [phone] at hosp to schedule.  Thanks

## 2021-05-12 ENCOUNTER — OFFICE VISIT (OUTPATIENT)
Dept: FAMILY MEDICINE CLINIC | Age: 56
End: 2021-05-12
Payer: COMMERCIAL

## 2021-05-12 VITALS
TEMPERATURE: 97.6 F | DIASTOLIC BLOOD PRESSURE: 80 MMHG | BODY MASS INDEX: 42.27 KG/M2 | RESPIRATION RATE: 20 BRPM | HEART RATE: 109 BPM | OXYGEN SATURATION: 96 % | WEIGHT: 278 LBS | SYSTOLIC BLOOD PRESSURE: 120 MMHG

## 2021-05-12 DIAGNOSIS — M25.552 PAIN OF BOTH HIP JOINTS: ICD-10-CM

## 2021-05-12 DIAGNOSIS — M25.551 PAIN OF BOTH HIP JOINTS: ICD-10-CM

## 2021-05-12 DIAGNOSIS — J44.1 ACUTE EXACERBATION OF CHRONIC OBSTRUCTIVE PULMONARY DISEASE (COPD) (HCC): ICD-10-CM

## 2021-05-12 DIAGNOSIS — J43.2 CENTRILOBULAR EMPHYSEMA (HCC): Primary | ICD-10-CM

## 2021-05-12 DIAGNOSIS — E78.2 MIXED HYPERLIPIDEMIA: ICD-10-CM

## 2021-05-12 DIAGNOSIS — E55.9 VITAMIN D DEFICIENCY: ICD-10-CM

## 2021-05-12 DIAGNOSIS — I25.10 CORONARY ARTERY DISEASE INVOLVING NATIVE CORONARY ARTERY OF NATIVE HEART WITHOUT ANGINA PECTORIS: ICD-10-CM

## 2021-05-12 DIAGNOSIS — E11.59 TYPE 2 DIABETES MELLITUS WITH OTHER CIRCULATORY COMPLICATION, WITHOUT LONG-TERM CURRENT USE OF INSULIN (HCC): ICD-10-CM

## 2021-05-12 DIAGNOSIS — Z72.0 TOBACCO ABUSE: ICD-10-CM

## 2021-05-12 PROCEDURE — 99215 OFFICE O/P EST HI 40 MIN: CPT | Performed by: INTERNAL MEDICINE

## 2021-05-12 PROCEDURE — 1111F DSCHRG MED/CURRENT MED MERGE: CPT | Performed by: INTERNAL MEDICINE

## 2021-05-12 PROCEDURE — 3052F HG A1C>EQUAL 8.0%<EQUAL 9.0%: CPT | Performed by: INTERNAL MEDICINE

## 2021-05-12 RX ORDER — IPRATROPIUM/ALBUTEROL SULFATE 20-100 MCG
MIST INHALER (GRAM) INHALATION
COMMUNITY
Start: 2021-05-06 | End: 2022-07-21

## 2021-05-12 RX ORDER — SERTRALINE HYDROCHLORIDE 100 MG/1
TABLET, FILM COATED ORAL
Qty: 30 TABLET | Refills: 5 | Status: SHIPPED
Start: 2021-05-12 | End: 2022-05-03 | Stop reason: SDUPTHER

## 2021-05-12 RX ORDER — PREDNISONE 20 MG/1
TABLET ORAL
COMMUNITY
Start: 2021-05-06 | End: 2022-05-18

## 2021-05-12 RX ORDER — ALPRAZOLAM 0.25 MG/1
0.25 TABLET ORAL 3 TIMES DAILY PRN
Qty: 30 TABLET | Refills: 0 | Status: SHIPPED | OUTPATIENT
Start: 2021-05-12 | End: 2021-07-02 | Stop reason: SDUPTHER

## 2021-05-12 ASSESSMENT — ENCOUNTER SYMPTOMS
EYES NEGATIVE: 1
GASTROINTESTINAL NEGATIVE: 1
SHORTNESS OF BREATH: 0
WHEEZING: 0
ALLERGIC/IMMUNOLOGIC NEGATIVE: 1

## 2021-06-02 LAB
LV EF: 40 %
LVEF MODALITY: NORMAL

## 2021-06-02 RX ORDER — LISINOPRIL 20 MG/1
TABLET ORAL
Qty: 90 TABLET | Refills: 1 | Status: SHIPPED
Start: 2021-06-02 | End: 2021-06-29 | Stop reason: ALTCHOICE

## 2021-06-02 RX ORDER — GLIMEPIRIDE 4 MG/1
TABLET ORAL
Qty: 180 TABLET | Refills: 1 | Status: SHIPPED
Start: 2021-06-02 | End: 2021-12-20

## 2021-06-02 RX ORDER — ISOSORBIDE DINITRATE 30 MG/1
TABLET ORAL
Qty: 90 TABLET | Refills: 1 | Status: SHIPPED
Start: 2021-06-02 | End: 2021-12-20

## 2021-06-02 NOTE — TELEPHONE ENCOUNTER
Last Appointment:  5/12/2021  Future Appointments   Date Time Provider So Fierroi   6/29/2021 11:00 AM MD Astrid Herron [facility]   8/17/2021  3:30 PM Cirilo Hanson  [address] [place] [address]

## 2021-06-12 DIAGNOSIS — I25.10 CORONARY ARTERY DISEASE INVOLVING NATIVE CORONARY ARTERY OF NATIVE HEART WITHOUT ANGINA PECTORIS: ICD-10-CM

## 2021-06-14 RX ORDER — ROSUVASTATIN CALCIUM 40 MG/1
TABLET, COATED ORAL
Qty: 90 TABLET | Refills: 1 | Status: SHIPPED
Start: 2021-06-14 | End: 2022-06-24

## 2021-06-29 ENCOUNTER — OFFICE VISIT (OUTPATIENT)
Dept: FAMILY MEDICINE CLINIC | Age: 56
End: 2021-06-29
Payer: COMMERCIAL

## 2021-06-29 VITALS
WEIGHT: 271 LBS | HEART RATE: 84 BPM | OXYGEN SATURATION: 95 % | SYSTOLIC BLOOD PRESSURE: 120 MMHG | TEMPERATURE: 97.2 F | DIASTOLIC BLOOD PRESSURE: 80 MMHG | BODY MASS INDEX: 41.21 KG/M2

## 2021-06-29 DIAGNOSIS — I89.0 LYMPHEDEMA: ICD-10-CM

## 2021-06-29 DIAGNOSIS — G47.33 OBSTRUCTIVE SLEEP APNEA SYNDROME: ICD-10-CM

## 2021-06-29 DIAGNOSIS — E11.59 TYPE 2 DIABETES MELLITUS WITH OTHER CIRCULATORY COMPLICATION, WITHOUT LONG-TERM CURRENT USE OF INSULIN (HCC): ICD-10-CM

## 2021-06-29 DIAGNOSIS — I25.10 CORONARY ARTERY DISEASE INVOLVING NATIVE CORONARY ARTERY OF NATIVE HEART WITHOUT ANGINA PECTORIS: ICD-10-CM

## 2021-06-29 DIAGNOSIS — J43.2 CENTRILOBULAR EMPHYSEMA (HCC): Primary | ICD-10-CM

## 2021-06-29 DIAGNOSIS — E78.2 MIXED HYPERLIPIDEMIA: ICD-10-CM

## 2021-06-29 PROCEDURE — 3052F HG A1C>EQUAL 8.0%<EQUAL 9.0%: CPT | Performed by: INTERNAL MEDICINE

## 2021-06-29 PROCEDURE — 99212 OFFICE O/P EST SF 10 MIN: CPT | Performed by: INTERNAL MEDICINE

## 2021-06-29 RX ORDER — SPIRONOLACTONE 25 MG/1
TABLET ORAL
COMMUNITY
Start: 2021-06-03 | End: 2022-05-18 | Stop reason: SDUPTHER

## 2021-06-29 RX ORDER — LISINOPRIL 10 MG/1
TABLET ORAL
COMMUNITY
Start: 2021-06-03 | End: 2022-05-18 | Stop reason: ALTCHOICE

## 2021-06-29 RX ORDER — TORSEMIDE 100 MG/1
100 TABLET ORAL DAILY
COMMUNITY
End: 2022-05-18 | Stop reason: ALTCHOICE

## 2021-06-29 RX ORDER — CLOPIDOGREL BISULFATE 75 MG/1
75 TABLET ORAL DAILY
Qty: 90 TABLET | Refills: 1 | Status: SHIPPED
Start: 2021-06-29 | End: 2022-05-03 | Stop reason: SDUPTHER

## 2021-06-29 ASSESSMENT — ENCOUNTER SYMPTOMS
GASTROINTESTINAL NEGATIVE: 1
SHORTNESS OF BREATH: 0
EYES NEGATIVE: 1
WHEEZING: 0
ALLERGIC/IMMUNOLOGIC NEGATIVE: 1

## 2021-06-29 NOTE — PROGRESS NOTES
2021    Kaylee Trotter (:  1965) is a 63 y.o. female, here for evaluation of the following medical concerns:    Patient had another hospitalization for exacerbation of COPD. She also had a secondary myocardial infarction. Patient was seen by cardiology. She does have a follow-up appoint with Dr. Huy Larry. Suggestion was made for possible change to [facility]. Most of the patient's problem really is coming from excessive work. Patient works anywhere from 59 to 80 hours/week. Given the fact that she is now oxygen dependent this is just not in her best interest.  I told her to go back to part-time and improve her health before attempting to go back to work full-time. Patient has not had any chest pain or chest pressure. She denies orthopnea or PND. She has no significant cough or sputum production. Denies fevers, chills, sweats. Peripheral lymphedema is chronic in nature. Does not seem to be worsened currently. Blood sugars are not ideally controlled. Patient just recently was through course of steroid. Diabetes  Associated symptoms include fatigue, polydipsia, polyphagia and polyuria. Hypertension  Pertinent negatives include no shortness of breath. Hyperlipidemia  Pertinent negatives include no shortness of breath. Other  Associated symptoms include fatigue. Review of Systems   Constitutional: Positive for activity change and fatigue. HENT: Negative for dental problem. Eyes: Negative. Respiratory: Negative for shortness of breath and wheezing. Recent acute exacerbation of COPD with hospitalization. Cardiovascular:        Recent secondary myocardial infarction secondary to acute exacerbation of COPD   Gastrointestinal: Negative. Endocrine: Positive for polydipsia, polyphagia and polyuria. Morbid obesity. Significantly elevated hemoglobin A1c at 9.5. Genitourinary: Positive for enuresis.    Musculoskeletal:        Improved greater trochanteric pain.  Occasional lumbar discomfort. Skin:        Venous stasis changes of the lower extremity. Allergic/Immunologic: Negative. Neurological: Negative. Hematological: Negative. Psychiatric/Behavioral:        Increased anxiety with breathing issues. Prior to Visit Medications    Medication Sig Taking?  Authorizing Provider   spironolactone (ALDACTONE) 25 MG tablet TAKE 1 2 (ONE HALF) TABLET BY MOUTH ONCE DAILY Yes Historical Provider, MD   torsemide (DEMADEX) 100 MG tablet Take 100 mg by mouth daily One weekly Yes Historical Provider, MD   rosuvastatin (CRESTOR) 40 MG tablet TAKE 1 TABLET DAILY Yes Mason Alexandra MD   glimepiride (AMARYL) 4 MG tablet TAKE 1 TABLET TWICE A DAY  BEFORE MEALS Yes Mason Alexandra MD   isosorbide dinitrate (ISORDIL) 30 MG tablet TAKE 1 TABLET DAILY Yes Mason Alexandra MD   metFORMIN (GLUCOPHAGE) 1000 MG tablet TAKE 1 TABLET TWICE DAILY  WITH MEALS Yes Mason Alexandra MD   sertraline (ZOLOFT) 100 MG tablet !/2 for one week then one po daily Yes Mason Alexandra MD   vitamin D (ERGOCALCIFEROL) 1.25 MG (73742 UT) CAPS capsule Take 1 capsule by mouth Twice a Week Yes Mason Alexandra MD   albuterol sulfate  (90 Base) MCG/ACT inhaler Inhale 2 puffs into the lungs every 6 hours as needed for Wheezing Yes Mason Alexandra MD   clopidogrel (PLAVIX) 75 MG tablet Take 1 tablet by mouth daily Yes Mason Alexandra MD   ranolazine (RANEXA) 500 MG extended release tablet TAKE 1 TABLET BY MOUTH TWICE DAILY Yes Historical Provider, MD   montelukast (SINGULAIR) 10 MG tablet  Yes Historical Provider, MD   insulin glargine (LANTUS SOLOSTAR) 100 UNIT/ML injection pen !0 units nightly and increase by 5 units every 3 days until -125  Patient taking differently: Taking 45 units daily Yes Mason Alexandra MD   ezetimibe (ZETIA) 10 MG tablet Take 1 tablet by mouth daily Yes Mason Alexandra MD   lisinopril (PRINIVIL;ZESTRIL) 10 MG tablet TAKE 1 TABLET BY MOUTH ONCE DAILY  Historical Provider, MD lisinopril (PRINIVIL;ZESTRIL) 20 MG tablet TAKE 1 TABLET DAILY  Patient not taking: Reported on 6/29/2021  Jules Lindsey MD   BREO ELLIPTA 200-25 MCG/INH AEPB inhaler USE 1 INHALATION ORALLY    DAILY  Jules Lindsey MD   COMBIVENT RESPIMAT  MCG/ACT AERS inhaler 1 PUFF(S) INHALED 4 TIMES A DAY FOR 5 DAYS THEN USE AS NEEDED FOR SHORTNESS OF BREATH OR WHEEZING  Historical Provider, MD   predniSONE (DELTASONE) 20 MG tablet TAKE TWO TABLETS TWO TIMES A DAY X3 DAYS THEN 1 TAB TWO TIMES A DAY X3 DAYS THEN 1 TABLET DAILY X3 DAYS 1/2 TAB DAILY X2 DAYS  Patient not taking: Reported on 6/29/2021  Historical Provider, MD   furosemide (LASIX) 80 MG tablet Take 1 tablet by mouth daily  Patient not taking: Reported on 6/29/2021  Jules Lindsey MD   zinc gluconate 50 MG tablet Take 50 mg by mouth daily  Patient not taking: Reported on 6/29/2021  Historical Provider, MD   potassium chloride (KLOR-CON) 10 MEQ extended release tablet Take 1 tablet by mouth daily  Patient not taking: Reported on 6/29/2021  Jules Lindsey MD   carvedilol (COREG) 6.25 MG tablet TAKE 1 TABLET BY MOUTH TWICE DAILY  Historical Provider, MD   Insulin Pen Needle 32G X 5 MM MISC 1 each by Does not apply route daily  Jules Lindsey MD   aspirin 81 MG EC tablet Take 81 mg by mouth daily  Historical Provider, MD   ipratropium-albuterol (DUONEB) 0.5-2.5 (3) MG/3ML SOLN nebulizer solution Inhale 3 mLs into the lungs every 4 hours  Jules Lindsey MD        Allergies   Allergen Reactions    Iodides      Could not breathe, gasping, coughing       Past Medical History:   Diagnosis Date    CAD (coronary artery disease) 2005    First MI at age 35.,  CABG in 2005 triple vessel, total of 4 stents placedlast in October 2018.     COPD (chronic obstructive pulmonary disease) ([facility] [address] 1    Enuresis age 13    HTN (hypertension) 2005    Hyperlipidemia 2005    Lumbar disc disease     Lymphedema 10/2018    Tobacco abuse 1985    Tobacco abuse counseling July  Diabetes Mother     Cancer Mother     COPD Father        Vitals:    06/29/21 1125   BP: 120/80   Pulse: 84   Temp: 97.2 °F (36.2 °C)   SpO2: 95%   Weight: 271 lb (122.9 kg)     Estimated body mass index is 41.21 kg/m² as calculated from the following:    Height as of 12/10/20: 5' 8\" (1.727 m). Weight as of this encounter: 271 lb (122.9 kg). Physical Exam  Constitutional:       Comments: Very obese individual in no apparent distress   HENT:      Head: Normocephalic and atraumatic. Right Ear: External ear normal.      Left Ear: External ear normal.      Nose: Nose normal.   Eyes:      Conjunctiva/sclera: Conjunctivae normal.      Pupils: Pupils are equal, round, and reactive to light. Cardiovascular:      Rate and Rhythm: Normal rate and regular rhythm. Heart sounds: Normal heart sounds. Comments: 2-3+ pitting edema to the knee bilaterally  Pulmonary:      Comments: Decreased breath sounds throughout all lung fields but no evidence of wheeze, rhonchi or rales. Musculoskeletal:         General: Normal range of motion. Cervical back: Normal range of motion and neck supple. Comments: Imitation of range of motion to both inversion and eversion of the hips   Skin:     Comments: Venous stasis changes below the mid tibia bilaterally. No evidence of cellulitis today. Neurological:      Mental Status: She is alert and oriented to person, place, and time. Psychiatric:         Behavior: Behavior normal.         Thought Content: Thought content normal.         Judgment: Judgment normal.       Danika Dominguez was seen today for anxiety.     Diagnoses and all orders for this visit:    Centrilobular emphysema ([address]    Obstructive sleep apnea syndrome    Type 2 diabetes mellitus with other circulatory complication, without long-term current use of insulin (HCC)    Mixed hyperlipidemia    Coronary artery disease involving native coronary artery of native heart without angina pectoris    Lymphedema    Other orders  -     clopidogrel (PLAVIX) 75 MG tablet; Take 1 tablet by mouth daily    At this point in time I have recommended that the patient only work as needed. I believe working 60 to 80 hours a week is just way too much in terms of her her multiple comorbidities. I told her I would supply a note necessary.   Patient does have a follow-up appointment with her pulmonologist next week and the week after with Dr. Sohail Benson her cardiologist

## 2021-07-02 ENCOUNTER — TELEPHONE (OUTPATIENT)
Dept: FAMILY MEDICINE CLINIC | Age: 56
End: 2021-07-02

## 2021-07-02 DIAGNOSIS — J43.2 CENTRILOBULAR EMPHYSEMA (HCC): ICD-10-CM

## 2021-07-02 RX ORDER — ALPRAZOLAM 0.25 MG/1
0.25 TABLET ORAL 3 TIMES DAILY PRN
Qty: 90 TABLET | Refills: 1 | Status: SHIPPED | OUTPATIENT
Start: 2021-07-02 | End: 2021-08-01

## 2021-07-02 NOTE — TELEPHONE ENCOUNTER
Kaur Camara forgot to get a new script for Alprazolam when she was in earlier this week. She only has a few left and not sure they will last until Tuesday. Please send a new script for it to [facility] in [facility].

## 2021-07-13 ENCOUNTER — OFFICE VISIT (OUTPATIENT)
Dept: CARDIOLOGY CLINIC | Age: 56
End: 2021-07-13

## 2021-07-13 VITALS
HEART RATE: 82 BPM | RESPIRATION RATE: 22 BRPM | SYSTOLIC BLOOD PRESSURE: 132 MMHG | WEIGHT: 274 LBS | BODY MASS INDEX: 41.52 KG/M2 | HEIGHT: 68 IN | DIASTOLIC BLOOD PRESSURE: 78 MMHG

## 2021-07-13 DIAGNOSIS — E78.2 MIXED HYPERLIPIDEMIA: Primary | ICD-10-CM

## 2021-07-13 PROBLEM — J18.9 PNEUMONIA DUE TO INFECTIOUS ORGANISM: Status: ACTIVE | Noted: 2021-07-13

## 2021-07-13 PROBLEM — K11.8 PAROTID MASS: Status: ACTIVE | Noted: 2018-10-05

## 2021-07-13 PROCEDURE — 99214 OFFICE O/P EST MOD 30 MIN: CPT | Performed by: INTERNAL MEDICINE

## 2021-07-13 PROCEDURE — 93000 ELECTROCARDIOGRAM COMPLETE: CPT | Performed by: INTERNAL MEDICINE

## 2021-07-13 NOTE — PROGRESS NOTES
CHIEF COMPLAINT: SOB/CAD-CABG/CHF    HISTORY OF PRESENT ILLNESS: Patient is a 63 y.o. female seen at the request of Lou Escobedo MD.      Some SOB. No CP. Some edema. Past Medical History:   Diagnosis Date    CAD (coronary artery disease) 2005    First MI at age 35.,  CABG in 2005 triple vessel, total of 4 stents placedlast in October 2018. 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       Patient Active Problem List   Diagnosis    Disorder of intervertebral disc of lumbar spine    Greater trochanteric bursitis of both hips    Pain of both hip joints    Lymphedema    Hyperlipidemia    Tobacco abuse    Acute exacerbation of chronic obstructive pulmonary disease (COPD) ([facility]    CAD (coronary artery disease)    Type 2 diabetes mellitus with circulatory disorder, without long-term current use of insulin (HCC)    Vaginal candidiasis    Centrilobular emphysema (HCC)    Obstructive sleep apnea syndrome    Vitamin D deficiency    Pneumonia due to infectious organism    Parotid mass       Allergies   Allergen Reactions    Iodides      Could not breathe, gasping, coughing       Current Outpatient Medications   Medication Sig Dispense Refill    ALPRAZolam (XANAX) 0.25 MG tablet Take 1 tablet by mouth 3 times daily as needed for Anxiety for up to 30 days.  90 tablet 1    spironolactone (ALDACTONE) 25 MG tablet TAKE 1 2 (ONE HALF) TABLET BY MOUTH ONCE DAILY      lisinopril (PRINIVIL;ZESTRIL) 10 MG tablet TAKE 1 TABLET BY MOUTH ONCE DAILY      torsemide (DEMADEX) 100 MG tablet Take 100 mg by mouth daily One weekly      clopidogrel (PLAVIX) 75 MG tablet Take 1 tablet by mouth daily 90 tablet 1    rosuvastatin (CRESTOR) 40 MG tablet TAKE 1 TABLET DAILY 90 tablet 1    BREO ELLIPTA 200-25 MCG/INH AEPB inhaler USE 1 INHALATION ORALLY    DAILY 3 each 1    glimepiride (AMARYL) 4 MG tablet TAKE 1 TABLET TWICE A DAY  BEFORE MEALS 180 tablet 1    isosorbide dinitrate (ISORDIL) 30 MG tablet TAKE 1 TABLET DAILY 90 tablet 1    metFORMIN (GLUCOPHAGE) 1000 MG tablet TAKE 1 TABLET TWICE DAILY  WITH MEALS 180 tablet 1    COMBIVENT RESPIMAT  MCG/ACT AERS inhaler 1 PUFF(S) INHALED 4 TIMES A DAY FOR 5 DAYS THEN USE AS NEEDED FOR SHORTNESS OF BREATH OR WHEEZING      predniSONE (DELTASONE) 20 MG tablet TAKE TWO TABLETS TWO TIMES A DAY X3 DAYS THEN 1 TAB TWO TIMES A DAY X3 DAYS THEN 1 TABLET DAILY X3 DAYS 1/2 TAB DAILY X2 DAYS      sertraline (ZOLOFT) 100 MG tablet !/2 for one week then one po daily 30 tablet 5    vitamin D (ERGOCALCIFEROL) 1.25 MG (20985 UT) CAPS capsule Take 1 capsule by mouth Twice a Week 24 capsule 1    albuterol sulfate  (90 Base) MCG/ACT inhaler Inhale 2 puffs into the lungs every 6 hours as needed for Wheezing 3 Inhaler 1    ranolazine (RANEXA) 500 MG extended release tablet TAKE 1 TABLET BY MOUTH TWICE DAILY      montelukast (SINGULAIR) 10 MG tablet       carvedilol (COREG) 6.25 MG tablet TAKE 1 TABLET BY MOUTH TWICE DAILY      insulin glargine (LANTUS SOLOSTAR) 100 UNIT/ML injection pen !0 units nightly and increase by 5 units every 3 days until -125 (Patient taking differently: Taking 45 units daily) 5 pen 3    Insulin Pen Needle 32G X 5 MM MISC 1 each by Does not apply route daily 100 each 3    aspirin 81 MG EC tablet Take 81 mg by mouth daily      ezetimibe (ZETIA) 10 MG tablet Take 1 tablet by mouth daily 90 tablet 1    ipratropium-albuterol (DUONEB) 0.5-2.5 (3) MG/3ML SOLN nebulizer solution Inhale 3 mLs into the lungs every 4 hours 180 vial 5     No current facility-administered medications for this visit.        Social History     Socioeconomic History    Marital status:      Spouse name: Not on file    Number of children: Not on file    Years of education: Not on file    Highest education level: Not on file   Occupational History    Not on file   Tobacco Use    Smoking status: Former Smoker     Packs/day: 0.50     Years: 40.00     Pack years: 20.00     Types: Cigarettes    Smokeless tobacco: Never Used   Vaping Use    Vaping Use: Every day   Substance and Sexual Activity    Alcohol use: Not Currently    Drug use: Never    Sexual activity: Not on file   Other Topics Concern    Not on file   Social History Narrative    Not on file     Social Determinants of Health     Financial Resource Strain:     Difficulty of Paying Living Expenses:    Food Insecurity:     Worried About [address] in the Last Year:     [address] [employer] in the Last Year:    Transportation Needs:     Lack of Transportation (Medical):  Lack of Transportation (Non-Medical):    Physical Activity:     Days of Exercise per Week:     Minutes of Exercise per Session:    Stress:     Feeling of Stress :    Social Connections:     Frequency of Communication with Friends and Family:     Frequency of Social Gatherings with Friends and Family:     Attends L[employer] day Services:     Active Member of Clubs or Organizations:     Attends Club or Organization Meetings:     Marital Status:    Intimate Partner Violence:     Fear of Current or Ex-Partner:     Emotionally Abused:     Physically Abused:     Sexually Abused:        Family History   Problem Relation Age of Onset    Coronary Art Dis Mother     Diabetes Mother     Cancer Mother     COPD Father        Review of Systems:  Heart: as above   Lungs: as above   Eyes: denies changes in vision or discharge. Ears: denies changes in hearing or pain. Nose: denies epistaxis or masses   Throat: denies sore throat or trouble swallowing. Neuro: denies numbness, tingling, tremors. Skin: denies rashes or itching.    : denies hematuria, dysuria   GI: denies vomiting, diarrhea   Psych: denies mood changed, anxiety, depression. All other systems negative. Physical Exam   /78   Pulse 82   Resp 22   Ht 5' 8\" (1.727 m)   Wt 274 lb (124.3 kg)   BMI 41.66 kg/m²   Constitutional: Oriented to person, place, and time. Well-developed and well-nourished. No distress. Head: Normocephalic and atraumatic. Eyes: EOM are normal. Pupils are equal, round, and reactive to light. Neck: Normal range of motion. Neck supple. No hepatojugular reflux and no JVD present. Carotid bruit is not present. No tracheal deviation present. No thyromegaly present. Cardiovascular: Normal rate, regular rhythm, normal heart sounds and intact distal pulses. Exam reveals no gallop and no friction rub. No murmur heard. Pulmonary/Chest: Effort normal and breath sounds normal. No respiratory distress. No wheezes. No rales. No tenderness. Abdominal: Soft. Bowel sounds are normal. No distension and no mass. No tenderness. No rebound and no guarding. Musculoskeletal: Normal range of motion. No edema and no tenderness. Lymphadenopathy:   No cervical adenopathy. No groin adenopathy. Neurological: Alert and oriented to person, place, and time. Skin: Skin is warm and dry. No rash noted. Not diaphoretic. No erythema. Psychiatric: Normal mood and affect. Behavior is normal.     EKG personally reviewed 07/13/21:  normal sinus rhythm, nonspecific ST and T waves changes. Echo Conclusion 12/2/2020:        Prior echo 2017.        TDS due to body habitus, lung disease.        optison used to better visualize endocardial border.        Poor sonic window.        Unable to assess LV wall thickness.        Left ventricular systolic function is normal.        LVEF is 55%.      The left ventricular diastolic function is normal.        Right ventricular systolic function could not be assessed.        Left atrium is  dilated.      ASSESSMENT AND PLAN:  Patient Active Problem List Diagnosis    Disorder of intervertebral disc of lumbar spine    Greater trochanteric bursitis of both hips    Pain of both hip joints    Lymphedema    Hyperlipidemia    Tobacco abuse    Acute exacerbation of chronic obstructive pulmonary disease (COPD) ([facility] [address] [facility]    CAD (coronary artery disease)    Type 2 diabetes mellitus with circulatory disorder, without long-term current use of insulin (HCC)    Vaginal candidiasis    Centrilobular emphysema (HCC)    Obstructive sleep apnea syndrome    Vitamin D deficiency    Pneumonia due to infectious organism    Parotid mass     1. CAD-CABG: Stable stress 6/2021. ASA/plavix/BB/nitrate/ranexa. 2. Acute on Chronic Systolic CHF:    ACEI/BB/aldactone/demadex(poor compliance with diuretic). Monitor volume. 3. COPD: On oxygen. 4. HTN: Observe. 5. DM: Per PCP. 6. Lipids: Statin. 7. LORA: CPAP. Willian Woodward D.O.   Cardiologist  Cardiology, [id number]

## 2021-08-19 DIAGNOSIS — J45.50 SEVERE PERSISTENT ASTHMA, UNSPECIFIED WHETHER COMPLICATED: ICD-10-CM

## 2021-08-19 RX ORDER — DIPHENHYDRAMINE HYDROCHLORIDE 50 MG/ML
50 INJECTION INTRAMUSCULAR; INTRAVENOUS ONCE
Status: CANCELLED | OUTPATIENT
Start: 2021-08-23 | End: 2021-08-23

## 2021-08-19 RX ORDER — METHYLPREDNISOLONE SODIUM SUCCINATE 125 MG/2ML
125 INJECTION, POWDER, LYOPHILIZED, FOR SOLUTION INTRAMUSCULAR; INTRAVENOUS ONCE
Status: CANCELLED | OUTPATIENT
Start: 2021-08-23 | End: 2021-08-23

## 2021-08-19 RX ORDER — EPINEPHRINE 1 MG/ML
0.3 INJECTION, SOLUTION, CONCENTRATE INTRAVENOUS PRN
Status: CANCELLED | OUTPATIENT
Start: 2021-08-23

## 2021-08-19 RX ORDER — ALBUTEROL SULFATE 2.5 MG/3ML
2.5 SOLUTION RESPIRATORY (INHALATION) ONCE
Status: CANCELLED
Start: 2021-08-23 | End: 2021-08-23

## 2021-09-13 RX ORDER — INSULIN GLARGINE 100 [IU]/ML
INJECTION, SOLUTION SUBCUTANEOUS
Qty: 15 ML | Refills: 0 | Status: SHIPPED
Start: 2021-09-13 | End: 2022-07-21

## 2021-12-20 RX ORDER — GLIMEPIRIDE 4 MG/1
TABLET ORAL
Qty: 180 TABLET | Refills: 1 | Status: SHIPPED
Start: 2021-12-20 | End: 2022-06-09 | Stop reason: SDUPTHER

## 2021-12-20 RX ORDER — ISOSORBIDE DINITRATE 30 MG/1
TABLET ORAL
Qty: 90 TABLET | Refills: 1 | Status: SHIPPED
Start: 2021-12-20 | End: 2022-05-03 | Stop reason: SDUPTHER

## 2021-12-20 RX ORDER — LISINOPRIL 20 MG/1
TABLET ORAL
Qty: 90 TABLET | Refills: 1 | Status: SHIPPED
Start: 2021-12-20 | End: 2022-05-18 | Stop reason: ALTCHOICE

## 2021-12-20 NOTE — TELEPHONE ENCOUNTER
Last Appointment:  6/29/2021  Future Appointments   Date Time Provider So Hanna   1/14/2022  9:30 AM Yobany Beatty  [address]

## 2021-12-22 RX ORDER — ALBUTEROL SULFATE 90 UG/1
2 AEROSOL, METERED RESPIRATORY (INHALATION) EVERY 6 HOURS PRN
Qty: 1 EACH | Refills: 2 | Status: SHIPPED
Start: 2021-12-22 | End: 2022-03-30 | Stop reason: SDUPTHER

## 2021-12-22 NOTE — TELEPHONE ENCOUNTER
Last Appointment:  6/29/2021  Future Appointments   Date Time Provider So Hanna   1/14/2022  9:30 AM Ed Chow  [address] [place] [address]

## 2022-02-02 ENCOUNTER — TELEPHONE (OUTPATIENT)
Dept: PHARMACY | Age: 57
End: 2022-02-02

## 2022-02-02 NOTE — TELEPHONE ENCOUNTER
Received a call from [facility] at the [address] [place] [address] letting me know that Rohith Nelson has been scheduled to receive her Fasenra infusion. Placed a call to Rohith Omar to get her signed up for a co-pay card. Left my contact information for Rohith Nelson to call me back. Let her who I was and what my role is and that we need to get her signed up for a co-pay card before her 1st infusion.

## 2022-02-07 ENCOUNTER — TELEPHONE (OUTPATIENT)
Dept: PHARMACY | Age: 57
End: 2022-02-07

## 2022-02-07 NOTE — TELEPHONE ENCOUNTER
Received a call from Ruby Pollard letting me know that she did get signed up for a co-pay card for her Juanita Berry. Will watch for co-pay to come through after she has her infusion and submit for payment.

## 2022-02-07 NOTE — TELEPHONE ENCOUNTER
Received a call from Paolo Rai asking about co-pay program for [place]. I gave her the information to call the [facility] to get signed up for a co-pay card. She is to call them and then call me back with copay information. Paolo Rai has my contact information .

## 2022-02-08 ENCOUNTER — HOSPITAL ENCOUNTER (OUTPATIENT)
Dept: INFUSION THERAPY | Age: 57
Setting detail: INFUSION SERIES
Discharge: HOME | End: 2022-02-08
Payer: COMMERCIAL

## 2022-02-08 VITALS
WEIGHT: 254 LBS | OXYGEN SATURATION: 96 % | RESPIRATION RATE: 18 BRPM | DIASTOLIC BLOOD PRESSURE: 73 MMHG | TEMPERATURE: 97.9 F | HEIGHT: 68 IN | SYSTOLIC BLOOD PRESSURE: 122 MMHG | BODY MASS INDEX: 38.49 KG/M2 | HEART RATE: 77 BPM

## 2022-02-08 DIAGNOSIS — J45.50 SEVERE PERSISTENT ASTHMA, UNSPECIFIED WHETHER COMPLICATED: Primary | ICD-10-CM

## 2022-02-08 PROCEDURE — 96372 THER/PROPH/DIAG INJ SC/IM: CPT

## 2022-02-08 PROCEDURE — 6360000002 HC RX W HCPCS: Performed by: INTERNAL MEDICINE

## 2022-02-08 RX ORDER — DIPHENHYDRAMINE HYDROCHLORIDE 50 MG/ML
50 INJECTION INTRAMUSCULAR; INTRAVENOUS ONCE
Status: CANCELLED | OUTPATIENT
Start: 2022-03-08 | End: 2022-03-08

## 2022-02-08 RX ORDER — METHYLPREDNISOLONE SODIUM SUCCINATE 125 MG/2ML
125 INJECTION, POWDER, LYOPHILIZED, FOR SOLUTION INTRAMUSCULAR; INTRAVENOUS ONCE
Status: CANCELLED | OUTPATIENT
Start: 2022-03-08 | End: 2022-03-08

## 2022-02-08 RX ORDER — ALBUTEROL SULFATE 2.5 MG/3ML
2.5 SOLUTION RESPIRATORY (INHALATION) ONCE
Status: CANCELLED
Start: 2022-03-08 | End: 2022-03-08

## 2022-02-08 RX ORDER — EPINEPHRINE 1 MG/ML
0.3 INJECTION, SOLUTION, CONCENTRATE INTRAVENOUS PRN
Status: CANCELLED | OUTPATIENT
Start: 2022-03-08

## 2022-02-08 RX ADMIN — BENRALIZUMAB 30 MG: 30 INJECTION, SOLUTION SUBCUTANEOUS at 11:29

## 2022-02-08 NOTE — PROGRESS NOTES
Tolerated injection, no reportable signs/symptoms at this time. Patient stayed the physician ordered wait after Fasenra injection. Reviewed AVS with patient, reviewed medication information, verbalizes good knowledge of current plan, and has no signs or symptoms to report at this time. Declines copy of AVS.  Next appointment scheduled.

## 2022-03-08 ENCOUNTER — HOSPITAL ENCOUNTER (OUTPATIENT)
Dept: INFUSION THERAPY | Age: 57
Setting detail: INFUSION SERIES
Discharge: HOME | End: 2022-03-08
Payer: COMMERCIAL

## 2022-03-08 VITALS
RESPIRATION RATE: 18 BRPM | SYSTOLIC BLOOD PRESSURE: 120 MMHG | BODY MASS INDEX: 37.89 KG/M2 | HEART RATE: 81 BPM | HEIGHT: 68 IN | DIASTOLIC BLOOD PRESSURE: 76 MMHG | WEIGHT: 250 LBS | OXYGEN SATURATION: 97 % | TEMPERATURE: 97.8 F

## 2022-03-08 DIAGNOSIS — J45.50 SEVERE PERSISTENT ASTHMA, UNSPECIFIED WHETHER COMPLICATED: Primary | ICD-10-CM

## 2022-03-08 PROCEDURE — 96372 THER/PROPH/DIAG INJ SC/IM: CPT

## 2022-03-08 PROCEDURE — 6360000002 HC RX W HCPCS: Performed by: INTERNAL MEDICINE

## 2022-03-08 RX ORDER — EPINEPHRINE 1 MG/ML
0.3 INJECTION, SOLUTION, CONCENTRATE INTRAVENOUS PRN
Status: CANCELLED | OUTPATIENT
Start: 2022-04-05

## 2022-03-08 RX ORDER — METHYLPREDNISOLONE SODIUM SUCCINATE 125 MG/2ML
125 INJECTION, POWDER, LYOPHILIZED, FOR SOLUTION INTRAMUSCULAR; INTRAVENOUS ONCE
Status: CANCELLED | OUTPATIENT
Start: 2022-04-05 | End: 2022-04-05

## 2022-03-08 RX ORDER — DIPHENHYDRAMINE HYDROCHLORIDE 50 MG/ML
50 INJECTION INTRAMUSCULAR; INTRAVENOUS ONCE
Status: CANCELLED | OUTPATIENT
Start: 2022-04-05 | End: 2022-04-05

## 2022-03-08 RX ORDER — ALBUTEROL SULFATE 2.5 MG/3ML
2.5 SOLUTION RESPIRATORY (INHALATION) ONCE
Status: CANCELLED
Start: 2022-04-05 | End: 2022-04-05

## 2022-03-08 RX ADMIN — BENRALIZUMAB 30 MG: 30 INJECTION, SOLUTION SUBCUTANEOUS at 11:57

## 2022-03-08 ASSESSMENT — PAIN SCALES - GENERAL: PAINLEVEL_OUTOF10: 5

## 2022-03-08 ASSESSMENT — PAIN DESCRIPTION - PAIN TYPE: TYPE: ACUTE PAIN

## 2022-03-08 ASSESSMENT — PAIN DESCRIPTION - FREQUENCY: FREQUENCY: CONTINUOUS

## 2022-03-08 ASSESSMENT — PAIN DESCRIPTION - LOCATION: LOCATION: HEAD

## 2022-03-08 ASSESSMENT — PAIN DESCRIPTION - DESCRIPTORS: DESCRIPTORS: ACHING

## 2022-03-30 RX ORDER — ALBUTEROL SULFATE 90 UG/1
2 AEROSOL, METERED RESPIRATORY (INHALATION) EVERY 6 HOURS PRN
Qty: 1 EACH | Refills: 2 | Status: SHIPPED
Start: 2022-03-30 | End: 2022-06-01 | Stop reason: SDUPTHER

## 2022-03-30 NOTE — TELEPHONE ENCOUNTER
Last Appointment:  6/29/2021  Future Appointments   Date Time Provider So Hanna   4/5/2022 11:30 AM [facility]   6/28/2022  3:30 PM Kush Lindsay  [address] [place] [address]

## 2022-03-31 LAB — SARS-COV-2, NAA: NOT DETECTED

## 2022-04-05 ENCOUNTER — HOSPITAL ENCOUNTER (OUTPATIENT)
Dept: INFUSION THERAPY | Age: 57
Setting detail: INFUSION SERIES
Discharge: HOME | End: 2022-04-05
Payer: COMMERCIAL

## 2022-04-05 VITALS
HEIGHT: 68 IN | WEIGHT: 250 LBS | OXYGEN SATURATION: 95 % | RESPIRATION RATE: 18 BRPM | BODY MASS INDEX: 37.89 KG/M2 | HEART RATE: 55 BPM | DIASTOLIC BLOOD PRESSURE: 56 MMHG | TEMPERATURE: 96.8 F | SYSTOLIC BLOOD PRESSURE: 111 MMHG

## 2022-04-05 DIAGNOSIS — J45.50 SEVERE PERSISTENT ASTHMA, UNSPECIFIED WHETHER COMPLICATED: Primary | ICD-10-CM

## 2022-04-05 PROCEDURE — 6360000002 HC RX W HCPCS: Performed by: INTERNAL MEDICINE

## 2022-04-05 PROCEDURE — 96372 THER/PROPH/DIAG INJ SC/IM: CPT

## 2022-04-05 RX ORDER — DIPHENHYDRAMINE HYDROCHLORIDE 50 MG/ML
50 INJECTION INTRAMUSCULAR; INTRAVENOUS ONCE
OUTPATIENT
Start: 2022-05-31 | End: 2022-05-31

## 2022-04-05 RX ORDER — ALBUTEROL SULFATE 2.5 MG/3ML
2.5 SOLUTION RESPIRATORY (INHALATION) ONCE
Start: 2022-05-31 | End: 2022-05-31

## 2022-04-05 RX ORDER — METHYLPREDNISOLONE SODIUM SUCCINATE 125 MG/2ML
125 INJECTION, POWDER, LYOPHILIZED, FOR SOLUTION INTRAMUSCULAR; INTRAVENOUS ONCE
OUTPATIENT
Start: 2022-05-31 | End: 2022-05-31

## 2022-04-05 RX ORDER — EPINEPHRINE 1 MG/ML
0.3 INJECTION, SOLUTION, CONCENTRATE INTRAVENOUS PRN
OUTPATIENT
Start: 2022-05-31

## 2022-04-05 RX ADMIN — BENRALIZUMAB 30 MG: 30 INJECTION, SOLUTION SUBCUTANEOUS at 11:27

## 2022-04-19 LAB
ALBUMIN: 3.3 GM/DL (ref 3.1–4.5)
ALP BLD-CCNC: 161 U/L (ref 45–117)
ALT SERPL-CCNC: 42 U/L (ref 12–78)
APTT: 28.2 SECONDS (ref 20–32.1)
AST SERPL-CCNC: 63 IU/L (ref 3–35)
BILIRUB SERPL-MCNC: 0.3 MG/DL (ref 0.2–1)
BUN BLDV-MCNC: 12 MG/DL (ref 7–24)
CALCIUM SERPL-MCNC: 9 MG/DL (ref 8.5–10.5)
CHLORIDE BLD-SCNC: 105 MMOL/L (ref 98–107)
CO2: 25 MMOL/L (ref 21–32)
CREAT SERPL-MCNC: 0.81 MG/DL (ref 0.55–1.02)
GFR AFRICAN AMERICAN: > 60 ML/MIN
GFR SERPL CREATININE-BSD FRML MDRD: >60 ML/MIN/
GLUCOSE: 370 MG/DL (ref 65–99)
HCT VFR BLD CALC: 53.8 % (ref 37–47)
HEMOGLOBIN: 17.2 G/DL (ref 12–16)
INR BLD: 1.1 (ref 2–3.5)
MAGNESIUM: 2.2 MG/DL (ref 1.5–2.1)
MCH RBC QN AUTO: 29.1 PG (ref 27–31)
MCHC RBC AUTO-ENTMCNC: 32 G/DL (ref 33–37)
MCV RBC AUTO: 91 FL (ref 81–99)
NUCLEATED RED BLOOD CELLS: 0 % (ref 0–0)
PDW BLD-RTO: 13.9 % (ref 0–14.5)
PLATELET # BLD: 406 10*3/UL (ref 130–400)
PMV BLD AUTO: 10.8 FL (ref 9.6–12.3)
POTASSIUM SERPL-SCNC: 4.3 MMOL/L (ref 3.5–5.1)
PRO BNP, N-TERMINAL: 647 PG/ML (ref 0–125)
PROTHROMBIN TIME: 11.2 SECONDS (ref 8.9–12.2)
RBC # BLD: 5.91 10*6/UL (ref 4.1–5.1)
SODIUM BLD-SCNC: 137 MMOL/L (ref 136–145)
TOTAL PROTEIN: 8 GM/DL (ref 6.4–8.2)
TROPONIN I, HIGH SENSITIVITY: 328 NG/L (ref 0–53)
TROPONIN I, HIGH SENSITIVITY: 342 NG/L (ref 0–53)
WBC # BLD: 18.9 10*3/UL (ref 4.8–10.8)

## 2022-04-20 LAB
ALBUMIN: 3.1 GM/DL (ref 3.1–4.5)
ALP BLD-CCNC: 145 U/L (ref 45–117)
ALT SERPL-CCNC: 50 U/L (ref 12–78)
AMORPH SEDIMENT: NORMAL
AST SERPL-CCNC: 45 IU/L (ref 3–35)
BACTERIA, URINE: NORMAL
BASE EXCESS ARTERIAL: -3.4 MMOL/L (ref -2–2)
BASE EXCESS ARTERIAL: 2.1 MMOL/L (ref -2–2)
BASE EXCESS: -2.4 MMOL/L (ref -2–2)
BASE EXCESS: 2.6 MMOL/L (ref -2–2)
BILIRUB SERPL-MCNC: 0.4 MG/DL (ref 0.2–1)
BILIRUBIN: NEGATIVE
BLOOD: NEGATIVE
BUN BLDV-MCNC: 18 MG/DL (ref 7–24)
CALCIUM SERPL-MCNC: 9 MG/DL (ref 8.5–10.5)
CHLORIDE BLD-SCNC: 104 MMOL/L (ref 98–107)
CHOLESTEROL: 171 MG/DL
CLARITY: NORMAL
CO2 CONTENT ARTERIAL: 26.3 (ref 23–27)
CO2 CONTENT ARTERIAL: 28.8 (ref 23–27)
CO2: 29 MMOL/L (ref 21–32)
COLOR: YELLOW
COMMENT: NO
CREAT SERPL-MCNC: 0.92 MG/DL (ref 0.55–1.02)
ESTIMATED AVERAGE GLUCOSE: 240
GFR AFRICAN AMERICAN: > 60 ML/MIN
GFR SERPL CREATININE-BSD FRML MDRD: >60 ML/MIN/
GLUCOSE: 371 MG/DL (ref 65–99)
GLUCOSE: NORMAL
HBA1C MFR BLD: 10 % (ref 4.8–5.6)
HCO3 ARTERIAL: 25 MMOL/L (ref 22–26)
HCO3 ARTERIAL: 27 MMOL/L (ref 22–26)
HCT VFR BLD CALC: 47.9 % (ref 37–47)
HDLC SERPL-MCNC: 43 MG/DL (ref 40–60)
HEMOGLOBIN: 15.5 G/DL (ref 12–16)
KETONES: NORMAL
LACTIC ACID: 2 MMOL/L (ref 0.4–2)
LDL CHOLESTEROL: 110 MG/DL (ref 9–159)
LEUKOCYTE ESTERASE, URINE: NEGATIVE
MAGNESIUM: 2.1 MG/DL (ref 1.5–2.1)
MCH RBC QN AUTO: 29.4 PG (ref 27–31)
MCHC RBC AUTO-ENTMCNC: 32.4 G/DL (ref 33–37)
MCV RBC AUTO: 90.9 FL (ref 81–99)
NITRITE, URINE: NEGATIVE
NUCLEATED RED BLOOD CELLS: 0 % (ref 0–0)
O2 SAT, ARTERIAL: 97 % (ref 95–97)
O2 SAT, ARTERIAL: 98 % (ref 95–97)
PCO2 ARTERIAL: 44 MMHG (ref 35–45)
PCO2 ARTERIAL: 54 MMHG (ref 35–45)
PDW BLD-RTO: 14 % (ref 0–14.5)
PH ARTERIAL: 7.27 (ref 7.35–7.45)
PH ARTERIAL: 7.41 (ref 7.35–7.45)
PH, URINE: 5.5 (ref 4.5–8)
PHOSPHORUS: 4.1 MG/DL (ref 2.5–4.9)
PLATELET # BLD: 303 10*3/UL (ref 130–400)
PMV BLD AUTO: 10.9 FL (ref 9.6–12.3)
PO2 ARTERIAL: 128.9 (ref 80–90)
PO2 ARTERIAL: 80.6 (ref 80–90)
POTASSIUM SERPL-SCNC: 4.8 MMOL/L (ref 3.5–5.1)
PROTEIN UA: NORMAL
RBC # BLD: 5.27 10*6/UL (ref 4.1–5.1)
RBC UA: NORMAL RBC/HPF (ref 0–2)
SODIUM BLD-SCNC: 136 MMOL/L (ref 136–145)
SPECIFIC GRAVITY UA: >=1.03 (ref 1–1.03)
T4 FREE: 0.88 NG/DL (ref 0.76–1.46)
TEMPERATURE: 97.3 F (ref 98–99)
TEMPERATURE: 98 F (ref 98–99)
TOTAL PROTEIN: 7.5 GM/DL (ref 6.4–8.2)
TRIGL SERPL-MCNC: 90 MG/DL
TSH SERPL DL<=0.05 MIU/L-ACNC: 0.92 UIU/ML (ref 0.36–4.75)
UROBILINOGEN, URINE: 1 E.U./DL (ref 0–1)
VLDLC SERPL CALC-MCNC: 18 MG/DL (ref 6–40)
WBC # BLD: 12.3 10*3/UL (ref 4.8–10.8)
WBC URINE: NORMAL WBC/HPF (ref 0–5)

## 2022-04-21 LAB
ABSOLUTE BASO #: 0 10*3/UL (ref 0–0.1)
ABSOLUTE EOS #: 0 10*3/UL (ref 0–0.4)
ABSOLUTE NEUT #: 15.4 10*3/UL (ref 2.3–7.9)
BASOPHILS %: 0.1 % (ref 0–1)
BUN BLDV-MCNC: 27 MG/DL (ref 7–24)
CALCIUM SERPL-MCNC: 10 MG/DL (ref 8.5–10.5)
CHLORIDE BLD-SCNC: 103 MMOL/L (ref 98–107)
CO2: 31 MMOL/L (ref 21–32)
CREAT SERPL-MCNC: 0.82 MG/DL (ref 0.55–1.02)
EOSINOPHILS %: 0 % (ref 1–4)
GFR AFRICAN AMERICAN: > 60 ML/MIN
GFR SERPL CREATININE-BSD FRML MDRD: >60 ML/MIN/
GLUCOSE: 308 MG/DL (ref 65–99)
HCT VFR BLD CALC: 46.8 % (ref 37–47)
HEMOGLOBIN: 14.9 G/DL (ref 12–16)
IMMATURE GRANULOCYTES #: 0.1 10*3/UL (ref 0–0.1)
IMMATURE GRANULOCYTES: 0.5 % (ref 0–1)
LYMPHOCYTE %: 10.8 % (ref 27–41)
LYMPHOCYTES # BLD: 2 10*3/UL (ref 1.3–4.4)
MCH RBC QN AUTO: 29.6 PG (ref 27–31)
MCHC RBC AUTO-ENTMCNC: 31.8 G/DL (ref 33–37)
MCV RBC AUTO: 92.9 FL (ref 81–99)
MONOCYTES # BLD: 0.9 10*3/UL (ref 0.1–1)
MONOCYTES %: 5.1 % (ref 3–9)
NEUTROPHILS %: 83.5 % (ref 47–73)
NUCLEATED RED BLOOD CELLS: 0 % (ref 0–0)
PDW BLD-RTO: 14 % (ref 0–14.5)
PLATELET # BLD: 315 10*3/UL (ref 130–400)
PMV BLD AUTO: 11.6 FL (ref 9.6–12.3)
POTASSIUM SERPL-SCNC: 4.7 MMOL/L (ref 3.5–5.1)
RBC # BLD: 5.04 10*6/UL (ref 4.1–5.1)
SODIUM BLD-SCNC: 136 MMOL/L (ref 136–145)
WBC # BLD: 18.5 10*3/UL (ref 4.8–10.8)

## 2022-04-22 LAB
ABSOLUTE BASO #: 0 10*3/UL (ref 0–0.1)
ABSOLUTE EOS #: 0 10*3/UL (ref 0–0.4)
ABSOLUTE NEUT #: 12.9 10*3/UL (ref 2.3–7.9)
ALBUMIN: 3 GM/DL (ref 3.1–4.5)
ALP BLD-CCNC: 115 U/L (ref 45–117)
ALT SERPL-CCNC: 37 U/L (ref 12–78)
AST SERPL-CCNC: 24 IU/L (ref 3–35)
BASOPHILS %: 0.1 % (ref 0–1)
BILIRUB SERPL-MCNC: 0.3 MG/DL (ref 0.2–1)
BUN BLDV-MCNC: 30 MG/DL (ref 7–24)
CALCIUM SERPL-MCNC: 9.5 MG/DL (ref 8.5–10.5)
CHLORIDE BLD-SCNC: 98 MMOL/L (ref 98–107)
CO2: 35 MMOL/L (ref 21–32)
CREAT SERPL-MCNC: 0.8 MG/DL (ref 0.55–1.02)
EOSINOPHILS %: 0 % (ref 1–4)
GFR AFRICAN AMERICAN: > 60 ML/MIN
GFR SERPL CREATININE-BSD FRML MDRD: >60 ML/MIN/
GLUCOSE: 311 MG/DL (ref 65–99)
HCT VFR BLD CALC: 46.6 % (ref 37–47)
HEMOGLOBIN: 14.9 G/DL (ref 12–16)
IMMATURE GRANULOCYTES #: 0.1 10*3/UL (ref 0–0.1)
IMMATURE GRANULOCYTES: 0.4 % (ref 0–1)
LYMPHOCYTE %: 12.8 % (ref 27–41)
LYMPHOCYTES # BLD: 2 10*3/UL (ref 1.3–4.4)
MCH RBC QN AUTO: 29.1 PG (ref 27–31)
MCHC RBC AUTO-ENTMCNC: 32 G/DL (ref 33–37)
MCV RBC AUTO: 91 FL (ref 81–99)
MONOCYTES # BLD: 0.8 10*3/UL (ref 0.1–1)
MONOCYTES %: 5 % (ref 3–9)
NEUTROPHILS %: 81.7 % (ref 47–73)
NUCLEATED RED BLOOD CELLS: 0 % (ref 0–0)
PDW BLD-RTO: 13.7 % (ref 0–14.5)
PLATELET # BLD: 324 10*3/UL (ref 130–400)
PMV BLD AUTO: 11.2 FL (ref 9.6–12.3)
POTASSIUM SERPL-SCNC: 4.6 MMOL/L (ref 3.5–5.1)
RBC # BLD: 5.12 10*6/UL (ref 4.1–5.1)
SODIUM BLD-SCNC: 137 MMOL/L (ref 136–145)
TOTAL PROTEIN: 7 GM/DL (ref 6.4–8.2)
WBC # BLD: 15.7 10*3/UL (ref 4.8–10.8)

## 2022-04-23 LAB
ABSOLUTE BASO #: 0 10*3/UL (ref 0–0.1)
ABSOLUTE EOS #: 0 10*3/UL (ref 0–0.4)
ABSOLUTE NEUT #: 10.5 10*3/UL (ref 2.3–7.9)
BASOPHILS %: 0.1 % (ref 0–1)
BUN BLDV-MCNC: 32 MG/DL (ref 7–24)
CALCIUM SERPL-MCNC: 9.6 MG/DL (ref 8.5–10.5)
CHLORIDE BLD-SCNC: 88 MMOL/L (ref 98–107)
CO2: 42 MMOL/L (ref 21–32)
CREAT SERPL-MCNC: 0.88 MG/DL (ref 0.55–1.02)
EOSINOPHILS %: 0 % (ref 1–4)
GFR AFRICAN AMERICAN: > 60 ML/MIN
GFR SERPL CREATININE-BSD FRML MDRD: >60 ML/MIN/
GLUCOSE: 323 MG/DL (ref 65–99)
GRAM STAIN RESULT: NORMAL
HCT VFR BLD CALC: 47.6 % (ref 37–47)
HEMOGLOBIN: 15.5 G/DL (ref 12–16)
IMMATURE GRANULOCYTES #: 0.1 10*3/UL (ref 0–0.1)
IMMATURE GRANULOCYTES: 0.7 % (ref 0–1)
LYMPHOCYTE %: 16.9 % (ref 27–41)
LYMPHOCYTES # BLD: 2.4 10*3/UL (ref 1.3–4.4)
MCH RBC QN AUTO: 29.6 PG (ref 27–31)
MCHC RBC AUTO-ENTMCNC: 32.6 G/DL (ref 33–37)
MCV RBC AUTO: 90.8 FL (ref 81–99)
MONOCYTES # BLD: 1.1 10*3/UL (ref 0.1–1)
MONOCYTES %: 7.6 % (ref 3–9)
NEUTROPHILS %: 74.7 % (ref 47–73)
NUCLEATED RED BLOOD CELLS: 0 % (ref 0–0)
PDW BLD-RTO: 13.3 % (ref 0–14.5)
PLATELET # BLD: 315 10*3/UL (ref 130–400)
PMV BLD AUTO: 11.2 FL (ref 9.6–12.3)
POTASSIUM SERPL-SCNC: 4.2 MMOL/L (ref 3.5–5.1)
RBC # BLD: 5.24 10*6/UL (ref 4.1–5.1)
SODIUM BLD-SCNC: 136 MMOL/L (ref 136–145)
WBC # BLD: 14 10*3/UL (ref 4.8–10.8)

## 2022-04-25 LAB — AEROBIC CULTURE: NORMAL

## 2022-04-26 ENCOUNTER — TELEPHONE (OUTPATIENT)
Dept: FAMILY MEDICINE CLINIC | Age: 57
End: 2022-04-26

## 2022-04-26 NOTE — TELEPHONE ENCOUNTER
Unsure where to schedule patient, [facility] discharge summary is in media tab from today.

## 2022-04-26 NOTE — TELEPHONE ENCOUNTER
----- Message from Michelricky Desire sent at 4/26/2022  1:20 PM EDT -----  Subject: Hospital Follow Up    QUESTIONS  What hospital was the Patient Discharged from? [facility]  Date of Discharge? 2022-04-24  Discharge Location? Home  Reason for hospitalization as patient stated? \"I couldn't breathe. \"   Congestive heart failure, respiratory failure, COPD. She was discharged   with oxygen home. What question does the patient have, if applicable? She says they changed   some of her meds, but says she wasn't sent home with anything. She states   she has not taken the medications she was previously prescribed that she   has due to the change, and she has only taken insulin since being   discharged. ---------------------------------------------------------------------------  --------------  Brianna GUPTA  What is the best way for the office to contact you? OK to leave message on   voicemail  Preferred Call Back Phone Number? [phone]  ---------------------------------------------------------------------------  --------------  SCRIPT ANSWERS  Relationship to Patient? Self  (Has the patient been discharged from the hospital within 2 business days   AND does not have a Telephone Encounter  Follow Up From [address] [place] [address]   documented in [address]?)?  Yes

## 2022-05-03 ENCOUNTER — OFFICE VISIT (OUTPATIENT)
Dept: FAMILY MEDICINE CLINIC | Age: 57
End: 2022-05-03
Payer: COMMERCIAL

## 2022-05-03 VITALS
SYSTOLIC BLOOD PRESSURE: 120 MMHG | DIASTOLIC BLOOD PRESSURE: 80 MMHG | TEMPERATURE: 97.6 F | WEIGHT: 243 LBS | BODY MASS INDEX: 36.95 KG/M2 | HEART RATE: 90 BPM | OXYGEN SATURATION: 95 %

## 2022-05-03 DIAGNOSIS — I25.10 CORONARY ARTERY DISEASE INVOLVING NATIVE CORONARY ARTERY OF NATIVE HEART WITHOUT ANGINA PECTORIS: ICD-10-CM

## 2022-05-03 DIAGNOSIS — I25.2 HX OF NON-ST ELEVATION MYOCARDIAL INFARCTION (NSTEMI): ICD-10-CM

## 2022-05-03 DIAGNOSIS — E11.59 TYPE 2 DIABETES MELLITUS WITH OTHER CIRCULATORY COMPLICATION, WITHOUT LONG-TERM CURRENT USE OF INSULIN (HCC): ICD-10-CM

## 2022-05-03 DIAGNOSIS — Z91.199 NONCOMPLIANCE: ICD-10-CM

## 2022-05-03 DIAGNOSIS — I50.32 CHRONIC DIASTOLIC CONGESTIVE HEART FAILURE (HCC): Primary | ICD-10-CM

## 2022-05-03 DIAGNOSIS — I50.32 CHRONIC DIASTOLIC CONGESTIVE HEART FAILURE (HCC): ICD-10-CM

## 2022-05-03 DIAGNOSIS — Z09 HOSPITAL DISCHARGE FOLLOW-UP: ICD-10-CM

## 2022-05-03 DIAGNOSIS — J43.2 CENTRILOBULAR EMPHYSEMA (HCC): ICD-10-CM

## 2022-05-03 DIAGNOSIS — E78.2 MIXED HYPERLIPIDEMIA: ICD-10-CM

## 2022-05-03 LAB
ALBUMIN SERPL-MCNC: 4.1 G/DL (ref 3.5–5.2)
ALP BLD-CCNC: 166 U/L (ref 35–104)
ALT SERPL-CCNC: 26 U/L (ref 0–32)
ANION GAP SERPL CALCULATED.3IONS-SCNC: 16 MMOL/L (ref 7–16)
AST SERPL-CCNC: 18 U/L (ref 0–31)
BILIRUB SERPL-MCNC: 0.2 MG/DL (ref 0–1.2)
BUN BLDV-MCNC: 22 MG/DL (ref 6–20)
CALCIUM SERPL-MCNC: 9.8 MG/DL (ref 8.6–10.2)
CHLORIDE BLD-SCNC: 89 MMOL/L (ref 98–107)
CO2: 29 MMOL/L (ref 22–29)
CREAT SERPL-MCNC: 0.7 MG/DL (ref 0.5–1)
GFR AFRICAN AMERICAN: >60
GFR NON-AFRICAN AMERICAN: >60 ML/MIN/1.73
GLUCOSE BLD-MCNC: 425 MG/DL (ref 74–99)
MAGNESIUM: 1.9 MG/DL (ref 1.6–2.6)
POTASSIUM SERPL-SCNC: 4.1 MMOL/L (ref 3.5–5)
PRO-BNP: 377 PG/ML (ref 0–125)
SODIUM BLD-SCNC: 134 MMOL/L (ref 132–146)
TOTAL PROTEIN: 7.8 G/DL (ref 6.4–8.3)

## 2022-05-03 PROCEDURE — 3046F HEMOGLOBIN A1C LEVEL >9.0%: CPT | Performed by: INTERNAL MEDICINE

## 2022-05-03 PROCEDURE — 99215 OFFICE O/P EST HI 40 MIN: CPT | Performed by: INTERNAL MEDICINE

## 2022-05-03 PROCEDURE — 1111F DSCHRG MED/CURRENT MED MERGE: CPT | Performed by: INTERNAL MEDICINE

## 2022-05-03 RX ORDER — ISOSORBIDE DINITRATE 30 MG/1
TABLET ORAL
Qty: 90 TABLET | Refills: 1 | Status: SHIPPED
Start: 2022-05-03 | End: 2022-05-18 | Stop reason: SDUPTHER

## 2022-05-03 RX ORDER — TORSEMIDE 20 MG/1
20 TABLET ORAL DAILY
Qty: 30 TABLET | Refills: 3 | Status: SHIPPED
Start: 2022-05-03 | End: 2022-05-18 | Stop reason: SDUPTHER

## 2022-05-03 RX ORDER — MONTELUKAST SODIUM 10 MG/1
10 TABLET ORAL NIGHTLY
Qty: 90 TABLET | Refills: 1 | Status: SHIPPED
Start: 2022-05-03 | End: 2022-05-18 | Stop reason: SDUPTHER

## 2022-05-03 RX ORDER — CARVEDILOL 6.25 MG/1
TABLET ORAL
Qty: 60 TABLET | Refills: 5 | Status: SHIPPED
Start: 2022-05-03 | End: 2022-07-21

## 2022-05-03 RX ORDER — INSULIN GLARGINE 100 [IU]/ML
10 INJECTION, SOLUTION SUBCUTANEOUS NIGHTLY
Qty: 5 PEN | Refills: 0 | Status: SHIPPED
Start: 2022-05-03 | End: 2022-07-21

## 2022-05-03 RX ORDER — CLOPIDOGREL BISULFATE 75 MG/1
75 TABLET ORAL DAILY
Qty: 90 TABLET | Refills: 1 | Status: SHIPPED
Start: 2022-05-03 | End: 2022-07-11

## 2022-05-03 RX ORDER — SERTRALINE HYDROCHLORIDE 100 MG/1
TABLET, FILM COATED ORAL
Qty: 30 TABLET | Refills: 5 | Status: SHIPPED
Start: 2022-05-03 | End: 2022-09-02 | Stop reason: SDUPTHER

## 2022-05-03 RX ORDER — EMPAGLIFLOZIN 10 MG/1
1 TABLET, FILM COATED ORAL DAILY
Qty: 30 TABLET | Refills: 5 | Status: SHIPPED
Start: 2022-05-03 | End: 2022-05-18 | Stop reason: SDUPTHER

## 2022-05-03 RX ORDER — ROSUVASTATIN CALCIUM 40 MG/1
40 TABLET, COATED ORAL DAILY
Qty: 30 TABLET | Refills: 5 | Status: SHIPPED
Start: 2022-05-03 | End: 2022-05-18 | Stop reason: SDUPTHER

## 2022-05-03 NOTE — PROGRESS NOTES
Post-Discharge Transitional Care Management Progress Note      Houston Salomon   YOB: 1965    Date of Office Visit:  5/3/2022  Date of Hospital Admission: 4/20/2022  Date of Hospital Discharge: 4/25/2022    Care management risk score Rising risk (score 2-5) and Complex Care (Scores >=6): 3     Non face to face  following discharge, date last encounter closed (first attempt may have been earlier): *No documented post hospital discharge outreach found in the last 14 days *No documented post hospital discharge outreach found in the last 14 days    Call initiated 2 business days of discharge: *No response recorded in the last 14 days    ASSESSMENT/PLAN:   Chronic diastolic congestive heart failure ([address]  -     Comprehensive Metabolic Panel; Future  -     Magnesium; Future  -     Brain Natriuretic Peptide; Future  -     XR CHEST STANDARD (2 VW); Future  Coronary artery disease involving native coronary artery of native heart without angina pectoris  -     Comprehensive Metabolic Panel; Future  -     Magnesium; Future  -     Brain Natriuretic Peptide; Future  Type 2 diabetes mellitus with other circulatory complication, without long-term current use of insulin (HCC)  -     Comprehensive Metabolic Panel; Future  -     Magnesium; Future  -     Brain Natriuretic Peptide; Future  Centrilobular emphysema (HCC)  Mixed hyperlipidemia  -     Comprehensive Metabolic Panel; Future  -     Magnesium; Future  -     Brain Natriuretic Peptide; Future      Medical Decision Making: high complexity  Return in about 2 weeks (around 5/17/2022). On this date 5/3/2022 I have spent 45 minutes reviewing previous notes, test results and face to face with the patient discussing the diagnosis and importance of compliance with the treatment plan as well as documenting on the day of the visit. Subjective:   HPI:  Follow up of Hospital problems/diagnosis(es): Status post hospitalization for acute hypoxic respiratory failure. Chest x-ray is reviewed. I did review the notes from cardiology. Patient did have type II MI. She did have elevated troponin levels. She has had chronic elevation intermittently of troponin secondary to respiratory distress. Patient's blood sugars are not well controlled with hemoglobin A1c above 10. Patient states that she has been out of her insulin for 4 months. She is taking a very limited amount of her medications because she states that her medications needed to come for me and that she did not get the medications on discharge. Compliance has been an issue. She has not followed with her cardiologist since last October. She is still having some issues with her breathing but she does follow-up with her pulmonologist and she states that she will make a quick follow-up with him. I have actually not seen the patient since June 2021. Patient has lost weight probably secondary to her poorly controlled diabetes. Patient was not on statin medication and this will be reinstituted. I did review the discharge summary and also her medication list.  Prescriptions for her old medications and the new medications are given to her and she is to get these locally. We will then reconsider these medications in several weeks when she returns. She was placed on Entresto during the hospitalization and also Jardiance. Inpatient course: Discharge summary reviewed- see chart. Interval history/Current status: Please see HPI.     Patient Active Problem List   Diagnosis    Disorder of intervertebral disc of lumbar spine    Greater trochanteric bursitis of both hips    Pain of both hip joints    Lymphedema    Hyperlipidemia    Tobacco abuse    [facility] of chronic obstructive pulmonary disease ([facility]D) [facility]    CAD (coronary artery disease)    Type 2 diabetes mellitus with circulatory disorder, without long-term current use of insulin (HCC)    Vaginal candidiasis    Centrilobular emphysema ([address]    Obstructive sleep apnea syndrome    Vitamin D deficiency    Pneumonia due to infectious organism    Parotid mass    Severe persistent asthma       Medications listed as ordered at the time of discharge from hospital     Medication List          Accurate as of May 3, 2022 10:09 AM. If you have any questions, ask your nurse or doctor. START taking these medications    Jardiance 10 MG tablet  Generic drug: empagliflozin  Take 1 tablet by mouth daily     sacubitril-valsartan 24-26 MG per tablet  Commonly known as: ENTRESTO  Take 1 tablet by mouth 2 times daily        CHANGE how you take these medications    * Lantus SoloStar 100 UNIT/ML injection pen  Generic drug: insulin glargine  Taking 45 units daily  What changed: Another medication with the same name was added. Make sure you understand how and when to take each. * Basaglar KwikPen 100 UNIT/ML injection pen  Generic drug: insulin glargine  Inject 10 Units into the skin nightly  What changed: You were already taking a medication with the same name, and this prescription was added. Make sure you understand how and when to take each. * rosuvastatin 40 MG tablet  Commonly known as: CRESTOR  TAKE 1 TABLET DAILY  What changed: Another medication with the same name was added. Make sure you understand how and when to take each. * rosuvastatin 40 MG tablet  Commonly known as: CRESTOR  Take 1 tablet by mouth daily  What changed: You were already taking a medication with the same name, and this prescription was added. Make sure you understand how and when to take each. * torsemide 100 MG tablet  Commonly known as: DEMADEX  What changed: Another medication with the same name was added. Make sure you understand how and when to take each. * torsemide 20 MG tablet  Commonly known as: Demadex  Take 1 tablet by mouth daily  What changed: You were already taking a medication with the same name, and this prescription was added.  Make sure you understand how and when to take each. * This list has 6 medication(s) that are the same as other medications prescribed for you. Read the directions carefully, and ask your doctor or other care provider to review them with you.             CONTINUE taking these medications    albuterol sulfate  (90 Base) MCG/ACT inhaler  Inhale 2 puffs into the lungs every 6 hours as needed for Wheezing     aspirin 81 MG EC tablet     Breo Ellipta 200-25 MCG/INH Aepb inhaler  Generic drug: Fluticasone furoate-vilanterol  USE 1 INHALATION ORALLY    DAILY     carvedilol 6.25 MG tablet  Commonly known as: COREG     clopidogrel 75 MG tablet  Commonly known as: PLAVIX  Take 1 tablet by mouth daily     ezetimibe 10 MG tablet  Commonly known as: ZETIA  Take 1 tablet by mouth daily     glimepiride 4 MG tablet  Commonly known as: AMARYL  TAKE 1 TABLET TWICE A DAY  BEFORE MEALS     Insulin Pen Needle 32G X 5 MM Misc  1 each by Does not apply route daily     * ipratropium-albuterol 0.5-2.5 (3) MG/3ML Soln nebulizer solution  Commonly known as: DUONEB  Inhale 3 mLs into the lungs every 4 hours     * Combivent Respimat  MCG/ACT Aers inhaler  Generic drug: albuterol-ipratropium     isosorbide dinitrate 30 MG tablet  Commonly known as: ISORDIL  TAKE 1 TABLET DAILY     * lisinopril 10 MG tablet  Commonly known as: PRINIVIL;ZESTRIL     * lisinopril 20 MG tablet  Commonly known as: PRINIVIL;ZESTRIL  TAKE 1 TABLET DAILY     metFORMIN 1000 MG tablet  Commonly known as: GLUCOPHAGE  TAKE 1 TABLET TWICE DAILY  WITH MEALS     montelukast 10 MG tablet  Commonly known as: SINGULAIR     predniSONE 20 MG tablet  Commonly known as: DELTASONE     ranolazine 500 MG extended release tablet  Commonly known as: RANEXA     sertraline 100 MG tablet  Commonly known as: ZOLOFT  !/2 for one week then one po daily     spironolactone 25 MG tablet  Commonly known as: ALDACTONE     vitamin D 1.25 MG (27353 UT) Caps capsule  Commonly known as: ERGOCALCIFEROL  Take 1 capsule by mouth Twice a Week         * This list has 4 medication(s) that are the same as other medications prescribed for you. Read the directions carefully, and ask your doctor or other care provider to review them with you.                Where to Get Your Medications      These medications were sent to [facility] [address] [phone]  [address]    Phone: [phone]   · Basaglar KwikPen 100 UNIT/ML injection pen  · Jardiance 10 MG tablet  · rosuvastatin 40 MG tablet  · sacubitril-valsartan 24-26 MG per tablet  · torsemide 20 MG tablet           Medications marked \"taking\" at this time  Outpatient Medications Marked as Taking for the 5/3/22 encounter (Office Visit) with Meena Castillo MD   Medication Sig Dispense Refill    sacubitril-valsartan (ENTRESTO) 24-26 MG per tablet Take 1 tablet by mouth 2 times daily 60 tablet 3    torsemide (DEMADEX) 20 MG tablet Take 1 tablet by mouth daily 30 tablet 3    insulin glargine (BASAGLAR KWIKPEN) 100 UNIT/ML injection pen Inject 10 Units into the skin nightly 5 pen 0    empagliflozin (JARDIANCE) 10 MG tablet Take 1 tablet by mouth daily 30 tablet 5    rosuvastatin (CRESTOR) 40 MG tablet Take 1 tablet by mouth daily 30 tablet 5    albuterol sulfate  (90 Base) MCG/ACT inhaler Inhale 2 puffs into the lungs every 6 hours as needed for Wheezing 1 each 2    metFORMIN (GLUCOPHAGE) 1000 MG tablet TAKE 1 TABLET TWICE DAILY  WITH MEALS 180 tablet 1    BREO ELLIPTA 200-25 MCG/INH AEPB inhaler USE 1 INHALATION ORALLY    DAILY 1 each 5    clopidogrel (PLAVIX) 75 MG tablet Take 1 tablet by mouth daily 90 tablet 1    sertraline (ZOLOFT) 100 MG tablet !/2 for one week then one po daily 30 tablet 5    vitamin D (ERGOCALCIFEROL) 1.25 MG (22191 UT) CAPS capsule Take 1 capsule by mouth Twice a Week 24 capsule 1    montelukast (SINGULAIR) 10 MG tablet  carvedilol (COREG) 6.25 MG tablet TAKE 1 TABLET BY MOUTH TWICE DAILY      aspirin 81 MG EC tablet Take 81 mg by mouth daily      ipratropium-albuterol (DUONEB) 0.5-2.5 (3) MG/3ML SOLN nebulizer solution Inhale 3 mLs into the lungs every 4 hours 180 vial 5        Medications patient taking as of now reconciled against medications ordered at time of hospital discharge: Yes    A comprehensive review of systems was negative except for what was noted in the HPI. Objective:    /80   Pulse 90   Temp 97.6 °F (36.4 °C)   Wt 243 lb (110.2 kg)   SpO2 95%   BMI 36.95 kg/m²   Physical examination:  Tympanic membranes are slightly dull but without evidence of air-fluid levels or erythema. No anterior or posterior cervical adenopathy lungs diminished breath sounds. Slight crackles heard at the left base but not at the right base. Slight expiratory wheeze middle and upper lung zones posteriorly. Heart is regular but very distant. Extremities are chronic venous stasis changes with 2+ edema below the mid tibia bilaterally. No evidence of open wounds present. Clarence Saxena was seen today for follow-up from hospital.    Diagnoses and all orders for this visit:    Chronic diastolic congestive heart failure ([address]  -     Comprehensive Metabolic Panel; Future  -     Magnesium; Future  -     Brain Natriuretic Peptide; Future  -     XR CHEST STANDARD (2 VW); Future    Coronary artery disease involving native coronary artery of native heart without angina pectoris  -     Comprehensive Metabolic Panel; Future  -     Magnesium; Future  -     Brain Natriuretic Peptide; Future    Type 2 diabetes mellitus with other circulatory complication, without long-term current use of insulin (HCC)  -     Comprehensive Metabolic Panel; Future  -     Magnesium; Future  -     Brain Natriuretic Peptide; Future    Centrilobular emphysema (HCC)    Mixed hyperlipidemia  -     Comprehensive Metabolic Panel; Future  -     Magnesium;  Future  - Brain Natriuretic Peptide; Future    Noncompliance    Hx of non-ST elevation myocardial infarction (NSTEMI)    Other orders  -     sacubitril-valsartan (ENTRESTO) 24-26 MG per tablet; Take 1 tablet by mouth 2 times daily  -     torsemide (DEMADEX) 20 MG tablet; Take 1 tablet by mouth daily  -     insulin glargine (BASAGLAR KWIKPEN) 100 UNIT/ML injection pen; Inject 10 Units into the skin nightly  -     empagliflozin (JARDIANCE) 10 MG tablet; Take 1 tablet by mouth daily  -     rosuvastatin (CRESTOR) 40 MG tablet; Take 1 tablet by mouth daily      Is extremely important that this patient be compliant with her medications. All of her prescriptions were reviewed and sent to her local pharmacy. She is to start these immediately. Patient will need very close follow-up. Labs are ordered for today along with chest x-ray. The patient is to be seen back in 2 weeks. I would recommend at least monthly follow-ups to make sure she is compliant with her medications and her breathing is better. She is to make appointments with her pulmonologist and Dr. Rhona Bustos her cardiologist.  An electronic signature was used to authenticate this note.   --Marielle Alonzo MD

## 2022-05-12 ENCOUNTER — CARE COORDINATION (OUTPATIENT)
Dept: CARE COORDINATION | Age: 57
End: 2022-05-12

## 2022-05-12 NOTE — CARE COORDINATION
Attempted to reach patient by telephone. Left HIPAA compliant message requesting a return call. Will send introduction letter via mail. Will attempt to reach patient again.

## 2022-05-12 NOTE — LETTER
May 12, 2022      [address] [place] [address] Ligia Marlow [facility] [address]      Dear Margarette Givens:      My name is Noah Heller RN and I am an [facility], who partners with Elbert Sears MD to improve patients' health. I've been trying to reach you via phone to let you know that, as a member of your care team, I will work with other providers involved in your care, offer education for your specific health conditions, and connect you with more resources as needed. This program is designed to provide you with the opportunity to have a [facility] care manager partner with you for the following situations:     1) if you come home from the hospital or emergency room   2) to help manage your disease   3) when you would like assistance coordinating services or appointments    This added support is provided at no additional cost to you. My primary focus is to help you achieve specific goals and improve your health. Please call me at [phone] to further discuss how I can support your health care needs.       Sincerely,    Noah Heller RN  Ambulatory Care Manager

## 2022-05-18 ENCOUNTER — OFFICE VISIT (OUTPATIENT)
Dept: FAMILY MEDICINE CLINIC | Age: 57
End: 2022-05-18
Payer: COMMERCIAL

## 2022-05-18 VITALS
HEART RATE: 70 BPM | OXYGEN SATURATION: 94 % | TEMPERATURE: 98 F | DIASTOLIC BLOOD PRESSURE: 60 MMHG | BODY MASS INDEX: 37.86 KG/M2 | WEIGHT: 249 LBS | SYSTOLIC BLOOD PRESSURE: 120 MMHG

## 2022-05-18 DIAGNOSIS — E78.2 MIXED HYPERLIPIDEMIA: ICD-10-CM

## 2022-05-18 DIAGNOSIS — J43.2 CENTRILOBULAR EMPHYSEMA (HCC): ICD-10-CM

## 2022-05-18 DIAGNOSIS — I25.10 CORONARY ARTERY DISEASE INVOLVING NATIVE CORONARY ARTERY OF NATIVE HEART WITHOUT ANGINA PECTORIS: ICD-10-CM

## 2022-05-18 DIAGNOSIS — E11.59 TYPE 2 DIABETES MELLITUS WITH OTHER CIRCULATORY COMPLICATION, WITHOUT LONG-TERM CURRENT USE OF INSULIN (HCC): ICD-10-CM

## 2022-05-18 DIAGNOSIS — I25.10 CORONARY ARTERY DISEASE INVOLVING NATIVE CORONARY ARTERY OF NATIVE HEART WITHOUT ANGINA PECTORIS: Primary | ICD-10-CM

## 2022-05-18 DIAGNOSIS — I50.32 CHRONIC DIASTOLIC CONGESTIVE HEART FAILURE (HCC): ICD-10-CM

## 2022-05-18 LAB
ALBUMIN SERPL-MCNC: 4.1 G/DL (ref 3.5–5.2)
ALP BLD-CCNC: 124 U/L (ref 35–104)
ALT SERPL-CCNC: 9 U/L (ref 0–32)
ANION GAP SERPL CALCULATED.3IONS-SCNC: 9 MMOL/L (ref 7–16)
AST SERPL-CCNC: 25 U/L (ref 0–31)
BILIRUB SERPL-MCNC: <0.2 MG/DL (ref 0–1.2)
BUN BLDV-MCNC: 22 MG/DL (ref 6–20)
CALCIUM SERPL-MCNC: 9.8 MG/DL (ref 8.6–10.2)
CHLORIDE BLD-SCNC: 100 MMOL/L (ref 98–107)
CO2: 31 MMOL/L (ref 22–29)
CREAT SERPL-MCNC: 0.8 MG/DL (ref 0.5–1)
GFR AFRICAN AMERICAN: >60
GFR NON-AFRICAN AMERICAN: >60 ML/MIN/1.73
GLUCOSE BLD-MCNC: 117 MG/DL (ref 74–99)
MAGNESIUM: 2 MG/DL (ref 1.6–2.6)
POTASSIUM SERPL-SCNC: 4.8 MMOL/L (ref 3.5–5)
SODIUM BLD-SCNC: 140 MMOL/L (ref 132–146)
TOTAL PROTEIN: 7.2 G/DL (ref 6.4–8.3)

## 2022-05-18 PROCEDURE — 99214 OFFICE O/P EST MOD 30 MIN: CPT | Performed by: INTERNAL MEDICINE

## 2022-05-18 PROCEDURE — 3046F HEMOGLOBIN A1C LEVEL >9.0%: CPT | Performed by: INTERNAL MEDICINE

## 2022-05-18 RX ORDER — ALBUTEROL SULFATE 90 MCG
2 HFA AEROSOL WITH ADAPTER (GRAM) INHALATION EVERY 6 HOURS PRN
Qty: 48 G | Refills: 3 | Status: SHIPPED
Start: 2022-05-18 | End: 2022-07-21

## 2022-05-18 RX ORDER — MONTELUKAST SODIUM 10 MG/1
10 TABLET ORAL NIGHTLY
Qty: 90 TABLET | Refills: 3 | Status: SHIPPED
Start: 2022-05-18 | End: 2022-09-17 | Stop reason: SDUPTHER

## 2022-05-18 RX ORDER — ISOSORBIDE DINITRATE 30 MG/1
TABLET ORAL
Qty: 90 TABLET | Refills: 3 | Status: SHIPPED
Start: 2022-05-18 | End: 2022-07-21

## 2022-05-18 RX ORDER — TORSEMIDE 20 MG/1
20 TABLET ORAL DAILY
Qty: 90 TABLET | Refills: 3 | Status: SHIPPED
Start: 2022-05-18 | End: 2022-07-21

## 2022-05-18 RX ORDER — SPIRONOLACTONE 25 MG/1
TABLET ORAL
Qty: 45 TABLET | Refills: 3 | Status: SHIPPED
Start: 2022-05-18 | End: 2022-07-21

## 2022-05-18 RX ORDER — ROSUVASTATIN CALCIUM 40 MG/1
40 TABLET, COATED ORAL DAILY
Qty: 90 TABLET | Refills: 3 | Status: SHIPPED
Start: 2022-05-18 | End: 2022-07-21

## 2022-05-18 ASSESSMENT — ENCOUNTER SYMPTOMS
EYES NEGATIVE: 1
GASTROINTESTINAL NEGATIVE: 1
WHEEZING: 0
ALLERGIC/IMMUNOLOGIC NEGATIVE: 1
SHORTNESS OF BREATH: 0

## 2022-05-18 NOTE — PROGRESS NOTES
2022    Leticia Chaney (:  1965) is a 61 y.o. female, here for evaluation of the following medical concerns:    Was recently hospitalized for acute exacerbation of COPD. She also had congestive heart failure and a type II myocardial infarction. Blood sugars were poorly controlled with hemoglobin A1c above 10. Since her last visit the patient has been started back on her insulin. She is doing way better with that averaging about 147 fasting. Previously her blood sugars were not coming below 200. Patient states when she took the Marybeth together she became very lightheaded. She has now split this dosing up and is able to tolerate this. She did have some urinary frequency with this but this is now resolving. Patient denies symptoms consistent with either vaginal yeast infection or urinary tract infection. I did advise her possibility of those 2 with Jardiance. The patient did not make an appointment yet with her pulmonologist.  She has not had chest pain or chest pressure. She denies orthopnea or PND. She denies any increase in peripheral lymphedema. She does have difficulty with environmental exposures such as grass being cut and fires. Diabetes  Associated symptoms include fatigue, polydipsia, polyphagia and polyuria. Hypertension  Pertinent negatives include no shortness of breath. Hyperlipidemia  Pertinent negatives include no shortness of breath. Other  Associated symptoms include fatigue. Review of Systems   Constitutional: Positive for activity change and fatigue. HENT: Negative for dental problem. Eyes: Negative. Respiratory: Negative for shortness of breath and wheezing. Recent acute exacerbation of COPD with hospitalization. Cardiovascular:        Recent secondary myocardial infarction secondary to acute exacerbation of COPD   Gastrointestinal: Negative. Endocrine: Positive for polydipsia, polyphagia and polyuria. Morbid obesity. Significantly elevated hemoglobin A1c at 10 +. Genitourinary: Positive for enuresis. Musculoskeletal:        Generalized arthralgia but no specific joint pain. Skin:        Venous stasis changes of the lower extremity. Allergic/Immunologic: Negative. Neurological: Negative. Hematological: Negative. Psychiatric/Behavioral:        Increased anxiety with breathing issues. Prior to Visit Medications    Medication Sig Taking?  Authorizing Provider   PROVENTIL  (90 Base) MCG/ACT inhaler Inhale 2 puffs into the lungs every 6 hours as needed for Wheezing Yes Sydnie Ortiz MD   torsemide (DEMADEX) 20 MG tablet Take 1 tablet by mouth daily Yes Sydnie Ortiz MD   empagliflozin (JARDIANCE) 10 MG tablet Take 1 tablet by mouth daily Yes Sydnie Ortiz MD   rosuvastatin (CRESTOR) 40 MG tablet Take 1 tablet by mouth daily Yes Sydnie Ortiz MD   isosorbide dinitrate (ISORDIL) 30 MG tablet TAKE 1 TABLET DAILY Yes Sydnie Ortiz MD   montelukast (SINGULAIR) 10 MG tablet Take 1 tablet by mouth nightly Yes Sydnie Ortiz MD   Fluticasone furoate-vilanterol (BREO ELLIPTA) 200-25 MCG/INH AEPB inhaler USE 1 INHALATION ORALLY    DAILY Yes Sydnie Ortiz MD   spironolactone (ALDACTONE) 25 MG tablet TAKE 1 2 (ONE HALF) TABLET BY MOUTH ONCE DAILY Yes Sydnie Ortiz MD   metFORMIN (GLUCOPHAGE) 1000 MG tablet TAKE 1 TABLET TWICE DAILY  WITH MEALS Yes Sydnie Ortiz MD   clopidogrel (PLAVIX) 75 MG tablet Take 1 tablet by mouth daily Yes Sydnie Ortiz MD   carvedilol (COREG) 6.25 MG tablet TAKE 1 TABLET BY MOUTH TWICE DAILY Yes Sydnie Ortiz MD   sertraline (ZOLOFT) 100 MG tablet !/2 for one week then one po daily Yes Sydnie Ortiz MD   glimepiride (AMARYL) 4 MG tablet TAKE 1 TABLET TWICE A DAY  BEFORE MEALS Yes Sydnie Ortiz MD   insulin glargine (LANTUS SOLOSTAR) 100 UNIT/ML injection pen Taking 45 units daily Yes Sydnie Ortiz MD   ezetimibe (ZETIA) 10 MG tablet Take 1 tablet by mouth daily Yes Michael Zhao MD   sacubitril-valsartan (ENTRESTO) 24-26 MG per tablet Take 1 tablet by mouth 2 times daily  Michael Zhao MD   insulin glargine (BASAGLAR KWIKPEN) 100 UNIT/ML injection pen Inject 10 Units into the skin nightly  Michael Zhao MD   albuterol sulfate  (90 Base) MCG/ACT inhaler Inhale 2 puffs into the lungs every 6 hours as needed for Wheezing  Michael Zhao MD   rosuvastatin (CRESTOR) 40 MG tablet TAKE 1 TABLET DAILY  Patient not taking: Reported on 5/3/2022  Michael Zhao MD   COMBIVENT RESPIMAT  MCG/ACT AERS inhaler 1 PUFF(S) INHALED 4 TIMES A DAY FOR 5 DAYS THEN USE AS NEEDED FOR SHORTNESS OF BREATH OR WHEEZING  Patient not taking: Reported on 5/3/2022  Historical Provider, MD   vitamin D (ERGOCALCIFEROL) 1.25 MG (04244 UT) CAPS capsule Take 1 capsule by mouth Twice a Week  Michael Zhao MD   ranolazine (RANEXA) 500 MG extended release tablet TAKE 1 TABLET BY MOUTH TWICE DAILY  Patient not taking: Reported on 5/3/2022  Historical Provider, MD   Insulin Pen Needle 32G X 5 MM MISC 1 each by Does not apply route daily  Michael Zhao MD   aspirin 81 MG EC tablet Take 81 mg by mouth daily  Historical Provider, MD   ipratropium-albuterol (DUONEB) 0.5-2.5 (3) MG/3ML SOLN nebulizer solution Inhale 3 mLs into the lungs every 4 hours  Michael Zhao MD        Allergies   Allergen Reactions    Iodides      Could not breathe, gasping, coughing       Past Medical History:   Diagnosis Date    CAD (coronary artery disease) 2005    First MI at age 35.,  CABG in 2005 triple vessel, total of 4 stents placed-last in October 2018.     COPD (chronic obstructive pulmonary disease) ([facility] 1    Enuresis age 12    HTN (hypertension) 2005    Hyperlipidemia 2005    Lumbar disc disease     Lymphedema 10/2018    Tobacco abuse 1985    Tobacco abuse counseling July second 2019    Type 2 diabetes mellitus ([facility] [address] [facility]     Warthin's tumor 10/2018       Past Surgical History:   Procedure Laterality Date    CARDIAC CATHETERIZATION  12/2020    CORONARY ARTERY BYPASS GRAFT  2005    three vessel    CYSTOSCOPY      For enuresis    HYSTERECTOMY      Menorrhagia    TONSILLECTOMY AND ADENOIDECTOMY      as a child    TUBAL LIGATION         Social History     Socioeconomic History    Marital status:      Spouse name: Not on file    Number of children: Not on file    Years of education: Not on file    Highest education level: Not on file   Occupational History    Not on file   Tobacco Use    Smoking status: Former Smoker     Packs/day: 0.50     Years: 40.00     Pack years: 20.00     Types: Cigarettes    Smokeless tobacco: Never Used   Vaping Use    Vaping Use: Every day   Substance and Sexual Activity    Alcohol use: Not Currently    Drug use: Never    Sexual activity: Not on file   Other Topics Concern    Not on file   Social History Narrative    Not on file     Social Determinants of Health     Financial Resource Strain:     Difficulty of Paying Living Expenses: Not on file   Food Insecurity:     Worried About [address] in the Last Year: Not on file    Irene of Food in the Last Year: Not on file   Transportation Needs:     Lack of Transportation (Medical): Not on file    Lack of Transportation (Non-Medical):  Not on file   Physical Activity:     Days of Exercise per Week: Not on file    Minutes of Exercise per Session: Not on file   Stress:     Feeling of Stress : Not on file   Social Connections:     Frequency of Communication with Friends and Family: Not on file    Frequency of Social Gatherings with Friends and Family: Not on file    Attends Scientology Services: Not on file    Active Member of Clubs or Organizations: Not on file    Attends Club or Organization Meetings: Not on file    Marital Status: Not on file   Intimate Partner Violence:     Fear of Current or Ex-Partner: Not on file    Emotionally Abused: Not on file    Physically Abused: Not on file   Josie Castano Sexually Abused: Not on file   Housing Stability:     Unable to Pay for Housing in the Last Year: Not on file    Number of Places Lived in the Last Year: Not on file    Unstable Housing in the Last Year: Not on file        Family History   Problem Relation Age of Onset    Coronary Art Dis Mother     Diabetes Mother     Cancer Mother     COPD Father        Vitals:    05/18/22 1559   BP: 120/60   Pulse: 70   Temp: 98 °F (36.7 °C)   SpO2: 94%   Weight: 249 lb (112.9 kg)     Estimated body mass index is 37.86 kg/m² as calculated from the following:    Height as of 4/5/22: 5' 8\" (1.727 m). Weight as of this encounter: 249 lb (112.9 kg). Physical Exam  Constitutional:       Comments: Very obese individual in no apparent distress   HENT:      Head: Normocephalic and atraumatic. Right Ear: External ear normal.      Left Ear: External ear normal.      Nose: Nose normal.   Eyes:      Conjunctiva/sclera: Conjunctivae normal.      Pupils: Pupils are equal, round, and reactive to light. Cardiovascular:      Rate and Rhythm: Normal rate and regular rhythm. Heart sounds: Normal heart sounds. Comments:   1+ pitting edema to the knee bilaterally  Pulmonary:      Comments: Decreased breath sounds throughout all lung fields but no evidence of wheeze, rhonchi or rales. Musculoskeletal:         General: Normal range of motion. Cervical back: Normal range of motion and neck supple. Comments: Imitation of range of motion to both inversion and eversion of the hips   Skin:     Comments: Venous stasis changes below the mid tibia bilaterally. No evidence of cellulitis today. Neurological:      Mental Status: She is alert and oriented to person, place, and time. Psychiatric:         Behavior: Behavior normal.         Thought Content: Thought content normal.         Judgment: Judgment normal.       Jacqui Petit was seen today for other.     Diagnoses and all orders for this visit:    Coronary artery disease involving native coronary artery of native heart without angina pectoris  -     Comprehensive Metabolic Panel; Future  -     Magnesium; Future    Type 2 diabetes mellitus with other circulatory complication, without long-term current use of insulin (HCC)  -     Comprehensive Metabolic Panel; Future  -     Magnesium; Future    Centrilobular emphysema ([facility] [address]  -     Comprehensive Metabolic Panel; Future  -     Magnesium; Future    Mixed hyperlipidemia  -     Comprehensive Metabolic Panel; Future  -     Magnesium; Future    Chronic diastolic congestive heart failure ([facility] [address]  -     Comprehensive Metabolic Panel; Future  -     Magnesium; Future    Other orders  -     PROVENTIL  (90 Base) MCG/ACT inhaler; Inhale 2 puffs into the lungs every 6 hours as needed for Wheezing  -     torsemide (DEMADEX) 20 MG tablet; Take 1 tablet by mouth daily  -     empagliflozin (JARDIANCE) 10 MG tablet; Take 1 tablet by mouth daily  -     rosuvastatin (CRESTOR) 40 MG tablet; Take 1 tablet by mouth daily  -     isosorbide dinitrate (ISORDIL) 30 MG tablet; TAKE 1 TABLET DAILY  -     montelukast (SINGULAIR) 10 MG tablet; Take 1 tablet by mouth nightly  -     Fluticasone furoate-vilanterol (BREO ELLIPTA) 200-25 MCG/INH AEPB inhaler; USE 1 INHALATION ORALLY    DAILY  -     spironolactone (ALDACTONE) 25 MG tablet; TAKE 1 2 (ONE HALF) TABLET BY MOUTH ONCE DAILY    Patient is to have lab work today. All of her medications are reviewed and renewals will be sent to her mail away order pharmacy. I will see the patient back in approximately 2 months. At that point time she will need repeat labs and hemoglobin A1c. Her last hemoglobin A1c was April 20.

## 2022-05-18 NOTE — PROGRESS NOTES
Post-Discharge Transitional Care Management Progress Note      Haris Manzanares   YOB: 1965    Date of Office Visit:  5/18/2022  Date of Hospital Admission: 4/20/2022  Date of Hospital Discharge: 4/25/2022    Care management risk score Rising risk (score 2-5) and Complex Care (Scores >=6): 4     Non face to face  following discharge, date last encounter closed (first attempt may have been earlier): *No documented post hospital discharge outreach found in the last 14 days *No documented post hospital discharge outreach found in the last 14 days    Call initiated 2 business days of discharge: *No response recorded in the last 14 days    ASSESSMENT/PLAN:   {There are no diagnoses linked to this encounter. (Refresh or delete this SmartLink)}    Medical Decision Making: high complexity  No follow-ups on file. On this date 5/3/2022 I have spent 45 minutes reviewing previous notes, test results and face to face with the patient discussing the diagnosis and importance of compliance with the treatment plan as well as documenting on the day of the visit. Subjective:   HPI:  Follow up of Hospital problems/diagnosis(es): Status post hospitalization for acute hypoxic respiratory failure. Chest x-ray is reviewed. I did review the notes from cardiology. Patient did have type II MI. She did have elevated troponin levels. She has had chronic elevation intermittently of troponin secondary to respiratory distress. Patient's blood sugars are not well controlled with hemoglobin A1c above 10. Patient states that she has been out of her insulin for 4 months. She is taking a very limited amount of her medications because she states that her medications needed to come for me and that she did not get the medications on discharge. Compliance has been an issue. She has not followed with her cardiologist since last October.   She is still having some issues with her breathing but she does follow-up with her pulmonologist and she states that she will make a quick follow-up with him. I have actually not seen the patient since June 2021. Patient has lost weight probably secondary to her poorly controlled diabetes. Patient was not on statin medication and this will be reinstituted. I did review the discharge summary and also her medication list.  Prescriptions for her old medications and the new medications are given to her and she is to get these locally. We will then reconsider these medications in several weeks when she returns. She was placed on Entresto during the hospitalization and also Jardiance. Inpatient course: Discharge summary reviewed- see chart. Interval history/Current status: Please see HPI. Patient Active Problem List   Diagnosis    Disorder of intervertebral disc of lumbar spine    Greater trochanteric bursitis of both hips    Pain of both hip joints    Lymphedema    Hyperlipidemia    Tobacco abuse    Acute exacerbation of chronic obstructive pulmonary disease (COPD) ([address]    CAD (coronary artery disease)    Type 2 diabetes mellitus with circulatory disorder, without long-term current use of insulin (HCC)    Vaginal candidiasis    Centrilobular emphysema ([address]    Obstructive sleep apnea syndrome    Vitamin D deficiency    Pneumonia due to infectious organism    Parotid mass    Severe persistent asthma    Chronic diastolic congestive heart failure ([address]    Noncompliance    Hx of non-ST elevation myocardial infarction (NSTEMI)       Medications listed as ordered at the time of discharge from hospital     Medication List          Accurate as of May 18, 2022  4:08 PM. If you have any questions, ask your nurse or doctor.             CONTINUE taking these medications    albuterol sulfate  (90 Base) MCG/ACT inhaler  Inhale 2 puffs into the lungs every 6 hours as needed for Wheezing     aspirin 81 MG EC tablet     Breo Ellipta 200-25 MCG/INH Aepb inhaler  Generic drug: Fluticasone furoate-vilanterol  USE 1 INHALATION ORALLY    DAILY     carvedilol 6.25 MG tablet  Commonly known as: COREG  TAKE 1 TABLET BY MOUTH TWICE DAILY     clopidogrel 75 MG tablet  Commonly known as: PLAVIX  Take 1 tablet by mouth daily     ezetimibe 10 MG tablet  Commonly known as: ZETIA  Take 1 tablet by mouth daily     glimepiride 4 MG tablet  Commonly known as: AMARYL  TAKE 1 TABLET TWICE A DAY  BEFORE MEALS     Insulin Pen Needle 32G X 5 MM Misc  1 each by Does not apply route daily     * ipratropium-albuterol 0.5-2.5 (3) MG/3ML Soln nebulizer solution  Commonly known as: DUONEB  Inhale 3 mLs into the lungs every 4 hours     * Combivent Respimat  MCG/ACT Aers inhaler  Generic drug: albuterol-ipratropium     isosorbide dinitrate 30 MG tablet  Commonly known as: ISORDIL  TAKE 1 TABLET DAILY     Jardiance 10 MG tablet  Generic drug: empagliflozin  Take 1 tablet by mouth daily     * Lantus SoloStar 100 UNIT/ML injection pen  Generic drug: insulin glargine  Taking 45 units daily     * Basaglar KwikPen 100 UNIT/ML injection pen  Generic drug: insulin glargine  Inject 10 Units into the skin nightly     * lisinopril 10 MG tablet  Commonly known as: PRINIVIL;ZESTRIL     * lisinopril 20 MG tablet  Commonly known as: PRINIVIL;ZESTRIL  TAKE 1 TABLET DAILY     metFORMIN 1000 MG tablet  Commonly known as: GLUCOPHAGE  TAKE 1 TABLET TWICE DAILY  WITH MEALS     montelukast 10 MG tablet  Commonly known as: SINGULAIR  Take 1 tablet by mouth nightly     ranolazine 500 MG extended release tablet  Commonly known as: RANEXA     * rosuvastatin 40 MG tablet  Commonly known as: CRESTOR  TAKE 1 TABLET DAILY     * rosuvastatin 40 MG tablet  Commonly known as: CRESTOR  Take 1 tablet by mouth daily     sacubitril-valsartan 24-26 MG per tablet  Commonly known as: ENTRESTO  Take 1 tablet by mouth 2 times daily     sertraline 100 MG tablet  Commonly known as: ZOLOFT  !/2 for one week then one po daily     spironolactone 25 MG tablet  Commonly known as: ALDACTONE     * torsemide 100 MG tablet  Commonly known as: DEMADEX     * torsemide 20 MG tablet  Commonly known as: Demadex  Take 1 tablet by mouth daily     vitamin D 1.25 MG (83400 UT) Caps capsule  Commonly known as: ERGOCALCIFEROL  Take 1 capsule by mouth Twice a Week         * This list has 10 medication(s) that are the same as other medications prescribed for you. Read the directions carefully, and ask your doctor or other care provider to review them with you. Medications marked \"taking\" at this time  Outpatient Medications Marked as Taking for the 5/18/22 encounter (Office Visit) with Marielle Alonzo MD   Medication Sig Dispense Refill    torsemide (DEMADEX) 20 MG tablet Take 1 tablet by mouth daily 30 tablet 3    empagliflozin (JARDIANCE) 10 MG tablet Take 1 tablet by mouth daily 30 tablet 5    rosuvastatin (CRESTOR) 40 MG tablet Take 1 tablet by mouth daily 30 tablet 5    isosorbide dinitrate (ISORDIL) 30 MG tablet TAKE 1 TABLET DAILY 90 tablet 1    metFORMIN (GLUCOPHAGE) 1000 MG tablet TAKE 1 TABLET TWICE DAILY  WITH MEALS 180 tablet 1    clopidogrel (PLAVIX) 75 MG tablet Take 1 tablet by mouth daily 90 tablet 1    carvedilol (COREG) 6.25 MG tablet TAKE 1 TABLET BY MOUTH TWICE DAILY 60 tablet 5    sertraline (ZOLOFT) 100 MG tablet !/2 for one week then one po daily 30 tablet 5    glimepiride (AMARYL) 4 MG tablet TAKE 1 TABLET TWICE A DAY  BEFORE MEALS 180 tablet 1    insulin glargine (LANTUS SOLOSTAR) 100 UNIT/ML injection pen Taking 45 units daily 15 mL 0    ezetimibe (ZETIA) 10 MG tablet Take 1 tablet by mouth daily 90 tablet 1        Medications patient taking as of now reconciled against medications ordered at time of hospital discharge: Yes    A comprehensive review of systems was negative except for what was noted in the HPI.     Objective:    /60   Pulse 70   Temp 98 °F (36.7 °C)   Wt 249 lb (112.9 kg)   SpO2 94%   BMI 37.86 kg/m² Physical examination:  Tympanic membranes are slightly dull but without evidence of air-fluid levels or erythema. No anterior or posterior cervical adenopathy lungs diminished breath sounds. Slight crackles heard at the left base but not at the right base. Slight expiratory wheeze middle and upper lung zones posteriorly. Heart is regular but very distant. Extremities are chronic venous stasis changes with 2+ edema below the mid tibia bilaterally. No evidence of open wounds present. There are no diagnoses linked to this encounter. Is extremely important that this patient be compliant with her medications. All of her prescriptions were reviewed and sent to her local pharmacy. She is to start these immediately. Patient will need very close follow-up. Labs are ordered for today along with chest x-ray. The patient is to be seen back in 2 weeks. I would recommend at least monthly follow-ups to make sure she is compliant with her medications and her breathing is better. She is to make appointments with her pulmonologist and Dr. Justen Acevedo her cardiologist.  An electronic signature was used to authenticate this note.   --Rosario Gordon MD

## 2022-06-01 ENCOUNTER — TELEPHONE (OUTPATIENT)
Dept: ADMINISTRATIVE | Age: 57
End: 2022-06-01

## 2022-06-01 RX ORDER — ALBUTEROL SULFATE 90 UG/1
2 AEROSOL, METERED RESPIRATORY (INHALATION) EVERY 6 HOURS PRN
Qty: 1 EACH | Refills: 2 | Status: SHIPPED
Start: 2022-06-01 | End: 2022-09-17 | Stop reason: SDUPTHER

## 2022-06-01 NOTE — TELEPHONE ENCOUNTER
Patient called needs her inhaler refilled is completely empty. Dr Den Becerra patient  Please call into [facility] in [facility], please call her and advise that it's been called in.  Thanks

## 2022-06-01 NOTE — TELEPHONE ENCOUNTER
Last Appointment:  5/18/2022  Future Appointments   Date Time Provider So Cyndi   6/6/2022 11:30 AM [facility]   8/10/2022  2:30 PM Nguyen Virgen  [address] [place] [address]

## 2022-06-09 RX ORDER — GLIMEPIRIDE 4 MG/1
TABLET ORAL
Qty: 180 TABLET | Refills: 1 | OUTPATIENT
Start: 2022-06-09

## 2022-06-09 RX ORDER — LISINOPRIL 20 MG/1
TABLET ORAL
Qty: 90 TABLET | Refills: 1 | OUTPATIENT
Start: 2022-06-09

## 2022-06-09 RX ORDER — GLIMEPIRIDE 4 MG/1
TABLET ORAL
Qty: 180 TABLET | Refills: 1 | Status: SHIPPED
Start: 2022-06-09 | End: 2022-07-21

## 2022-06-09 NOTE — TELEPHONE ENCOUNTER
Lisinopril was already discontinued, Epic will not let me remove it from encounter before sending to you, it makes the provider do a selective sign to pick which med refills are approved. Patient only needed the glimepiride refilled at this time.

## 2022-06-24 DIAGNOSIS — I25.10 CORONARY ARTERY DISEASE INVOLVING NATIVE CORONARY ARTERY OF NATIVE HEART WITHOUT ANGINA PECTORIS: ICD-10-CM

## 2022-06-24 RX ORDER — ROSUVASTATIN CALCIUM 40 MG/1
TABLET, COATED ORAL
Qty: 90 TABLET | Refills: 1 | Status: SHIPPED
Start: 2022-06-24 | End: 2022-07-21

## 2022-07-11 RX ORDER — CLOPIDOGREL BISULFATE 75 MG/1
TABLET ORAL
Qty: 90 TABLET | Refills: 1 | Status: SHIPPED
Start: 2022-07-11 | End: 2022-09-17 | Stop reason: SDUPTHER

## 2022-07-11 NOTE — TELEPHONE ENCOUNTER
Last Appointment:  5/18/2022  Future Appointments   Date Time Provider So Hanna   8/10/2022  2:30 PM Nadia Shepherd MD [facility]   8/23/2022  2:30 PM Nadia Shepherd  [address] [place] [address]

## 2022-07-20 ENCOUNTER — TELEPHONE (OUTPATIENT)
Dept: FAMILY MEDICINE CLINIC | Age: 57
End: 2022-07-20

## 2022-07-20 NOTE — TELEPHONE ENCOUNTER
I called Loni [place] and offered her Friday August 5th but she denied that appointment because it is not soon enough. She's okay with either office but I don't see any openings so Im unsure where I can schedule her?

## 2022-07-20 NOTE — TELEPHONE ENCOUNTER
----- Message from Kamala Prema sent at 7/19/2022  2:31 PM EDT -----  Subject: Appointment Request    Reason for Call: Established Patient Appointment needed: Routine Hospital   Follow Up    QUESTIONS    Reason for appointment request? Available appointments did not meet   patient need     Additional Information for Provider? Pt is calling in to reschedule her   hospital f/u on 08/23/22 to a sooner appointment.  Please advise.   ---------------------------------------------------------------------------  --------------  Angella Gaming [id number]  [phone]; OK to leave message on voicemail  ---------------------------------------------------------------------------  --------------  SCRIPT ANSWERS  JOSE GUADALUPE Screen: Nila Thomas

## 2022-07-21 ENCOUNTER — OFFICE VISIT (OUTPATIENT)
Dept: PRIMARY CARE CLINIC | Age: 57
End: 2022-07-21
Payer: COMMERCIAL

## 2022-07-21 VITALS
SYSTOLIC BLOOD PRESSURE: 90 MMHG | TEMPERATURE: 96.8 F | HEART RATE: 74 BPM | DIASTOLIC BLOOD PRESSURE: 64 MMHG | OXYGEN SATURATION: 95 % | WEIGHT: 243 LBS | BODY MASS INDEX: 36.95 KG/M2

## 2022-07-21 DIAGNOSIS — R13.12 OROPHARYNGEAL DYSPHAGIA: ICD-10-CM

## 2022-07-21 DIAGNOSIS — G47.33 OBSTRUCTIVE SLEEP APNEA SYNDROME: ICD-10-CM

## 2022-07-21 DIAGNOSIS — Z86.74 HISTORY OF CARDIAC ARREST: Primary | ICD-10-CM

## 2022-07-21 DIAGNOSIS — E78.2 MIXED HYPERLIPIDEMIA: ICD-10-CM

## 2022-07-21 DIAGNOSIS — R26.81 UNSTABLE GAIT: ICD-10-CM

## 2022-07-21 DIAGNOSIS — Z09 HOSPITAL DISCHARGE FOLLOW-UP: ICD-10-CM

## 2022-07-21 DIAGNOSIS — E11.59 TYPE 2 DIABETES MELLITUS WITH OTHER CIRCULATORY COMPLICATION, WITHOUT LONG-TERM CURRENT USE OF INSULIN (HCC): ICD-10-CM

## 2022-07-21 DIAGNOSIS — J43.2 CENTRILOBULAR EMPHYSEMA (HCC): ICD-10-CM

## 2022-07-21 DIAGNOSIS — I25.10 CORONARY ARTERY DISEASE INVOLVING NATIVE CORONARY ARTERY OF NATIVE HEART WITHOUT ANGINA PECTORIS: ICD-10-CM

## 2022-07-21 DIAGNOSIS — R47.1 DYSARTHRIA: ICD-10-CM

## 2022-07-21 LAB
ALBUMIN SERPL-MCNC: 4.3 G/DL (ref 3.5–5.2)
ALP BLD-CCNC: 171 U/L (ref 35–104)
ALT SERPL-CCNC: 18 U/L (ref 0–32)
ANION GAP SERPL CALCULATED.3IONS-SCNC: 16 MMOL/L (ref 7–16)
AST SERPL-CCNC: 19 U/L (ref 0–31)
BILIRUB SERPL-MCNC: 0.3 MG/DL (ref 0–1.2)
BUN BLDV-MCNC: 20 MG/DL (ref 6–20)
CALCIUM SERPL-MCNC: 9.8 MG/DL (ref 8.6–10.2)
CHLORIDE BLD-SCNC: 102 MMOL/L (ref 98–107)
CHOLESTEROL, TOTAL: 272 MG/DL (ref 0–199)
CO2: 25 MMOL/L (ref 22–29)
CREAT SERPL-MCNC: 0.6 MG/DL (ref 0.5–1)
GFR AFRICAN AMERICAN: >60
GFR NON-AFRICAN AMERICAN: >60 ML/MIN/1.73
GLUCOSE BLD-MCNC: 118 MG/DL (ref 74–99)
HDLC SERPL-MCNC: 43 MG/DL
LDL CHOLESTEROL CALCULATED: 166 MG/DL (ref 0–99)
POTASSIUM SERPL-SCNC: 4.1 MMOL/L (ref 3.5–5)
SODIUM BLD-SCNC: 143 MMOL/L (ref 132–146)
TOTAL PROTEIN: 7.6 G/DL (ref 6.4–8.3)
TRIGL SERPL-MCNC: 313 MG/DL (ref 0–149)
VLDLC SERPL CALC-MCNC: 63 MG/DL

## 2022-07-21 PROCEDURE — 99215 OFFICE O/P EST HI 40 MIN: CPT | Performed by: INTERNAL MEDICINE

## 2022-07-21 PROCEDURE — 1111F DSCHRG MED/CURRENT MED MERGE: CPT | Performed by: INTERNAL MEDICINE

## 2022-07-21 PROCEDURE — 3046F HEMOGLOBIN A1C LEVEL >9.0%: CPT | Performed by: INTERNAL MEDICINE

## 2022-07-21 RX ORDER — ASPIRIN 81 MG/1
TABLET, CHEWABLE ORAL
COMMUNITY
Start: 2022-07-06 | End: 2022-07-21

## 2022-07-21 RX ORDER — CYCLOBENZAPRINE HCL 10 MG
TABLET ORAL
COMMUNITY
End: 2022-07-21

## 2022-07-21 RX ORDER — INSULIN LISPRO 100 [IU]/ML
INJECTION, SOLUTION INTRAVENOUS; SUBCUTANEOUS
COMMUNITY
End: 2022-08-10 | Stop reason: ALTCHOICE

## 2022-07-21 RX ORDER — EMPAGLIFLOZIN 25 MG/1
TABLET, FILM COATED ORAL
COMMUNITY
Start: 2022-07-06

## 2022-07-21 RX ORDER — ACETAMINOPHEN 500 MG
1000 TABLET ORAL EVERY 6 HOURS PRN
COMMUNITY

## 2022-07-21 RX ORDER — INSULIN GLARGINE 100 [IU]/ML
INJECTION, SOLUTION SUBCUTANEOUS
COMMUNITY
Start: 2022-07-06 | End: 2022-08-10 | Stop reason: ALTCHOICE

## 2022-07-21 RX ORDER — ATORVASTATIN CALCIUM 40 MG/1
TABLET, FILM COATED ORAL
COMMUNITY
Start: 2022-07-06

## 2022-07-21 RX ORDER — POLYETHYLENE GLYCOL 3350 17 G/17G
17 POWDER, FOR SOLUTION ORAL DAILY
COMMUNITY
End: 2022-07-21

## 2022-07-21 RX ORDER — SENNA PLUS 8.6 MG/1
2 TABLET ORAL DAILY
COMMUNITY
End: 2022-07-21

## 2022-07-21 ASSESSMENT — PATIENT HEALTH QUESTIONNAIRE - PHQ9
2. FEELING DOWN, DEPRESSED OR HOPELESS: 1
SUM OF ALL RESPONSES TO PHQ QUESTIONS 1-9: 2
SUM OF ALL RESPONSES TO PHQ QUESTIONS 1-9: 2
SUM OF ALL RESPONSES TO PHQ9 QUESTIONS 1 & 2: 2
1. LITTLE INTEREST OR PLEASURE IN DOING THINGS: 1
SUM OF ALL RESPONSES TO PHQ QUESTIONS 1-9: 2
SUM OF ALL RESPONSES TO PHQ QUESTIONS 1-9: 2

## 2022-07-21 NOTE — PROGRESS NOTES
Post-Discharge Transitional Care Management Progress Note      Soni Duran   YOB: 1965    Date of Office Visit:  7/21/2022  Date of Hospital Admission: 06/12/2022  Date of Hospital Discharge: 07/06/2022    Care management risk score Rising risk (score 2-5) and Complex Care (Scores >=6): No Risk Score On File     Non face to face  following discharge, date last encounter closed (first attempt may have been earlier): *No documented post hospital discharge outreach found in the last 14 days *No documented post hospital discharge outreach found in the last 14 days    Call initiated 2 business days of discharge: *No response recorded in the last 14 days    ASSESSMENT/PLAN:   History of cardiac arrest  -     External Referral To Physical Therapy  -     External Referral To Occupational Therapy  -     External Referral To Speech Therapy  Unstable gait  -     External Referral To Physical Therapy  -     External Referral To Occupational Therapy  Dysarthria  -     External Referral To Speech Therapy  Oropharyngeal dysphagia  -     External Referral To Speech Therapy  Coronary artery disease involving native coronary artery of native heart without angina pectoris  -     Comprehensive Metabolic Panel; Future  -     Lipid Panel; Future  Type 2 diabetes mellitus with other circulatory complication, without long-term current use of insulin (HCC)  -     Comprehensive Metabolic Panel; Future  -     Lipid Panel; Future  Centrilobular emphysema (HCC)  Mixed hyperlipidemia  Obstructive sleep apnea syndrome      Medical Decision Making: high complexity  No follow-ups on file. On this date 7/21/2022 I have spent 6 minutes reviewing previous notes, test results and face to face with the patient discussing the diagnosis and importance of compliance with the treatment plan as well as documenting on the day of the visit.      Subjective:   HPI:  Follow up of Hospital problems/diagnosis(es): Status post cardiac arrest on June 12, 2022. Life flighted to [facility]. Subsequent acute rehab facility until July 6, 2022    Inpatient course: Discharge summary reviewed- see chart. Interval history/Current status: Patient complained of shortness of breath the day of the cardiac arrest.  Driven to the hospital by her . On route the patient stopped breathing. She was in full cardiac arrest when she arrived in the emergency room at [facility].  Patient was then intubated and life flighted to [facility]. Patient was cooled per protocol. She then underwent heart catheterization with angioplasty and stent x2. (Please see cardiac cath report from [facility]) patient was seen by neurology had CT of the brain. She also was evaluated by speech. The patient did aspirate clear liquids. She was not discharged on Thick-It. She was seen by speech pathology and acute rehab facility. The patient is still having problems with dysarthria. She is denying however that she has problems with choking or coughing with eating. She has an unsteady gait and did fall within the last week. She did not strike her head. She bruised her left arm. Blood sugars at home have been under somewhat reasonable control although not ideal control. Her blood sugars have been running in the high 100s to low 200 range. Currently she was only taking 10 units of Lantus + Humalog sliding scale before meals. The patient is advised to be start 15 units of Lantus and continue to follow the sliding scale. I did question because of some clouded mentation whether or not the patient was actually following her medication compliance. We did go over the medications with both the patient and the  to try to assure as best we could that the patient was taking her medications correctly. Reconciliation of medicines with discharge from acute rehab was performed. The patient currently seems to be breathing okay. She does usually struggle with this warmer weather. removed. Continue taking this medication, and follow the directions you see here.   Changed by: Ed Chow MD     sertraline 100 MG tablet  Commonly known as: ZOLOFT  !/2 for one week then one po daily  What changed:   how much to take  how to take this  when to take this  additional instructions     vitamin D 1.25 MG (97617 UT) Caps capsule  Commonly known as: ERGOCALCIFEROL  Take 1 capsule by mouth Twice a Week  What changed: when to take this            CONTINUE taking these medications      acetaminophen 500 MG tablet  Commonly known as: TYLENOL     albuterol sulfate  (90 Base) MCG/ACT inhaler  Commonly known as: PROVENTIL;VENTOLIN;PROAIR  Inhale 2 puffs into the lungs every 6 hours as needed for Wheezing     atorvastatin 40 MG tablet  Commonly known as: LIPITOR     clopidogrel 75 MG tablet  Commonly known as: PLAVIX  TAKE 1 TABLET DAILY     HumaLOG KwikPen 100 UNIT/ML Sopn  Generic drug: insulin lispro (1 Unit Dial)     Insulin Pen Needle 32G X 5 MM Misc  1 each by Does not apply route daily     montelukast 10 MG tablet  Commonly known as: SINGULAIR  Take 1 tablet by mouth nightly     sacubitril-valsartan 24-26 MG per tablet  Commonly known as: ENTRESTO  Take 1 tablet by mouth 2 times daily            STOP taking these medications      rosuvastatin 40 MG tablet  Commonly known as: CRESTOR  Stopped by: Ed Chow MD                Medications marked \"taking\" at this time  Outpatient Medications Marked as Taking for the 7/21/22 encounter (Office Visit) with Ed Chow MD   Medication Sig Dispense Refill    JARDIANCE 25 MG tablet TAKE 1 TABLET BY MOUTH ONCE DAILY      LANTUS 100 UNIT/ML injection vial INJECT 15 UNITS SUBCUTANEOUSLY ONCE DAILY AT 8 AM      atorvastatin (LIPITOR) 40 MG tablet TAKE 1 TABLET BY MOUTH ONCE DAILY      acetaminophen (TYLENOL) 500 MG tablet Take 1,000 mg by mouth every 6 hours as needed for Pain      insulin lispro, 1 Unit Dial, (HUMALOG KWIKPEN) 100 UNIT/ML SOPN Inject bilaterally. Orthostatic blood pressures are reviewed. Mendel Middleton was seen today for follow-up from hospital.    Diagnoses and all orders for this visit:    History of cardiac arrest  -     External Referral To Physical Therapy  -     External Referral To Occupational Therapy  -     External Referral To Speech Therapy    Unstable gait  -     External Referral To Physical Therapy  -     External Referral To Occupational Therapy    Dysarthria  -     External Referral To Speech Therapy    Oropharyngeal dysphagia  -     External Referral To Speech Therapy    Coronary artery disease involving native coronary artery of native heart without angina pectoris  -     Comprehensive Metabolic Panel; Future  -     Lipid Panel; Future    Type 2 diabetes mellitus with other circulatory complication, without long-term current use of insulin (HCC)  -     Comprehensive Metabolic Panel; Future  -     Lipid Panel; Future    Centrilobular emphysema (HCC)    Mixed hyperlipidemia    Obstructive sleep apnea syndrome  The patient is to return within the next several weeks. She does have a cardiology follow-up next week at [facility] in [place]. The patient will have lab work today. This will be reviewed. If her lipids are not under better control I would question compliance with medication. An electronic signature was used to authenticate this note.   --Nadia Shepherd MD

## 2022-07-22 ENCOUNTER — CARE COORDINATION (OUTPATIENT)
Dept: CARE COORDINATION | Age: 57
End: 2022-07-22

## 2022-07-22 NOTE — LETTER
7/22/2022    [address] [facility] [address]      Dear Alaina Isaac,    My name is Xiao Scott RN and I am a registered nurse who partners with Henok Arevalo MD to improve patients' health. Henok Arevalo MD believes you would benefit from working with me. As a member of your health care team, I would work with other providers involved in your care, offer education for your specific health conditions, and connect you with additional resources as needed. I will collaborate with Henok Arevalo MD to support you in following your treatment plan. The additional support I provide is no additional cost to you. My primary focus is to help you achieve specific goals and improve your health. We are committed to walk with you on this journey and look forward to working with you. Please call me to further discuss your healthcare needs. I am available by phone. You can reach me at [phone] .     In good health,     Xiao Scott RN

## 2022-07-22 NOTE — CARE COORDINATION
Attempted to reach patient by telephone. Left HIPAA compliant message requesting a return call. Will attempt to reach patient again. Will send introduction letter via mail.

## 2022-07-26 ENCOUNTER — CARE COORDINATION (OUTPATIENT)
Dept: CARE COORDINATION | Age: 57
End: 2022-07-26

## 2022-07-27 ENCOUNTER — CARE COORDINATION (OUTPATIENT)
Dept: CARE COORDINATION | Age: 57
End: 2022-07-27

## 2022-07-27 NOTE — CARE COORDINATION
[facility] contacted Carol Deleon as planned. She is in the care on the way to an eye doctor appointment. She states she will be home tomorrow after 430pm with her . They are agreeable to receiving a call from [facility] at that time to complete enrolment and review medications.

## 2022-07-28 ENCOUNTER — CARE COORDINATION (OUTPATIENT)
Dept: CARE COORDINATION | Age: 57
End: 2022-07-28

## 2022-07-28 RX ORDER — CARVEDILOL 6.25 MG/1
6.25 TABLET ORAL 2 TIMES DAILY WITH MEALS
COMMUNITY
End: 2022-09-17 | Stop reason: SDUPTHER

## 2022-07-28 RX ORDER — SENNA PLUS 8.6 MG/1
2 TABLET ORAL DAILY
COMMUNITY

## 2022-07-28 NOTE — CARE COORDINATION
Attempted to reach patient by telephone to confirm scheduled phone appointment for today at 430pm.  Left HIPAA compliant message requesting a return call. Will attempt to reach patient again.

## 2022-07-29 NOTE — CARE COORDINATION
KATTYM spoke with Lorne Shepard. Her  is not present as planned. She does however, have her most recent medication list.  Medication list was reviewed with Lorne Shepard and updated. Cardiology has added coreg 6.25 mg bid. Lorne Shepard states she had been on coreg in the past and has some at home. She is unable to remember why she had stopped taking the coreg previously. ACM inquired if patient is taking her medications daily as prescribed. She states she does take her medications with the assistance of her . She states she takes her morning medications before he leaves for work and her other medications when he returns home. She is not using a pill set. [facility] again offered referral to [facility] to initiate pre packaged meds and Lorne Shepard declines. [facility] spoke with Dr. Tae Fortune. [facility] contacted [facility] and spoke with Fercho to enroll Lorne Shepard. Per Fercho, the initial enrollment process takes 3-5 days. [facility] will contact Lorne Shepard directly to schedule an in home visit. [facility] contacted Lorne Shepard to update her.

## 2022-08-10 ENCOUNTER — OFFICE VISIT (OUTPATIENT)
Dept: FAMILY MEDICINE CLINIC | Age: 57
End: 2022-08-10
Payer: COMMERCIAL

## 2022-08-10 VITALS
BODY MASS INDEX: 36.95 KG/M2 | WEIGHT: 243 LBS | OXYGEN SATURATION: 96 % | HEART RATE: 75 BPM | DIASTOLIC BLOOD PRESSURE: 60 MMHG | SYSTOLIC BLOOD PRESSURE: 110 MMHG | TEMPERATURE: 97.2 F

## 2022-08-10 DIAGNOSIS — I25.2 HX OF NON-ST ELEVATION MYOCARDIAL INFARCTION (NSTEMI): ICD-10-CM

## 2022-08-10 DIAGNOSIS — J43.2 CENTRILOBULAR EMPHYSEMA (HCC): ICD-10-CM

## 2022-08-10 DIAGNOSIS — I25.10 CORONARY ARTERY DISEASE INVOLVING NATIVE CORONARY ARTERY OF NATIVE HEART WITHOUT ANGINA PECTORIS: Primary | ICD-10-CM

## 2022-08-10 DIAGNOSIS — E78.2 MIXED HYPERLIPIDEMIA: ICD-10-CM

## 2022-08-10 DIAGNOSIS — E55.9 VITAMIN D DEFICIENCY: ICD-10-CM

## 2022-08-10 DIAGNOSIS — I89.0 LYMPHEDEMA: ICD-10-CM

## 2022-08-10 PROBLEM — Z79.4 TYPE 2 DIABETES MELLITUS WITH HYPERGLYCEMIA, WITH LONG-TERM CURRENT USE OF INSULIN (HCC): Status: ACTIVE | Noted: 2020-06-02

## 2022-08-10 PROBLEM — E11.65 TYPE 2 DIABETES MELLITUS WITH HYPERGLYCEMIA, WITH LONG-TERM CURRENT USE OF INSULIN (HCC): Status: ACTIVE | Noted: 2020-06-02

## 2022-08-10 PROCEDURE — 99214 OFFICE O/P EST MOD 30 MIN: CPT | Performed by: INTERNAL MEDICINE

## 2022-08-10 RX ORDER — INSULIN LISPRO 100 [IU]/ML
INJECTION, SOLUTION INTRAVENOUS; SUBCUTANEOUS
Qty: 4 PEN | Refills: 3 | Status: SHIPPED
Start: 2022-08-10 | End: 2022-09-17 | Stop reason: SDUPTHER

## 2022-08-10 RX ORDER — INSULIN GLARGINE 100 [IU]/ML
20 INJECTION, SOLUTION SUBCUTANEOUS NIGHTLY
Qty: 5 PEN | Refills: 0 | Status: SHIPPED
Start: 2022-08-10 | End: 2022-09-17 | Stop reason: SDUPTHER

## 2022-08-10 NOTE — PROGRESS NOTES
Patient seems to be doing better she is attending physical therapy and Occupational Therapy at the [facility] in [facility]. Her speech is better today and her cognition seems to be a bit better. She is little bit better with word finding. Her blood sugars are still running quite high. I will adjust her Lantus. At this point time I am not going to increase her lispro dose. We have decided to give her prepackaged medications and she agrees to this. I believe this will help with medication compliance. I did indicate to her that she could not be taking the Lipitor and high-dose with her LDL at 166. Her asthma symptoms seem to be much better. She has stopped smoking. Blood pressure here is well controlled. ASSESSMENT/PLAN:   Coronary artery disease involving native coronary artery of native heart without angina pectoris  Centrilobular emphysema (HCC)  Mixed hyperlipidemia  Hx of non-ST elevation myocardial infarction (NSTEMI)  Lymphedema  Vitamin D deficiency           Subjective:   HPI: 3-week follow-up. Patient seems to be doing a bit better. Interval history/Current status: Patient complained of shortness of breath the day of the cardiac arrest.  Driven to the hospital by her . On route the patient stopped breathing. She was in full cardiac arrest when she arrived in the emergency room at [facility].  Patient was then intubated and life flighted to [facility]. Patient was cooled per protocol. She then underwent heart catheterization with angioplasty and stent x2. (Please see cardiac cath report from [facility]) patient was seen by neurology had CT of the brain. She also was evaluated by speech. The patient did aspirate clear liquids. She was not discharged on Thick-It. She was seen by speech pathology and acute rehab facility. The patient is still having problems with dysarthria. She is denying however that she has problems with choking or coughing with eating.   She has an unsteady gait and did fall within the last week. She did not strike her head. She bruised her left arm. Blood sugars at home have been under somewhat reasonable control although not ideal control. Her blood sugars have been running in the high 100s to low 200 range. Currently she was only taking 10 units of Lantus + Humalog sliding scale before meals. The patient is advised to be start 15 units of Lantus and continue to follow the sliding scale. I did question because of some clouded mentation whether or not the patient was actually following her medication compliance. We did go over the medications with both the patient and the  to try to assure as best we could that the patient was taking her medications correctly. Reconciliation of medicines with discharge from acute rehab was performed. The patient currently seems to be breathing okay. She does usually struggle with this warmer weather. She does use oxygen if her saturations drop but they have not done so. Her breathing medications are also reviewed. She does use a CPAP at home and she states she is compliant with this. August 10, 2022 update patient is better with cognition. She was able to rise up from a chair by herself today and she was not able to do this last time. We will increase Lantus today. She agrees to prepackaged medications. I have spoken to the care coordinator regarding this. The patient will need repeat labs next time to see whether medical compliance is being followed.   Patient Active Problem List   Diagnosis    Disorder of intervertebral disc of lumbar spine    Greater trochanteric bursitis of both hips    Pain of both hip joints    Lymphedema    Hyperlipidemia    Tobacco abuse    Acute exacerbation of chronic obstructive pulmonary disease (COPD) ([address]    CAD (coronary artery disease)    Type 2 diabetes mellitus with hyperglycemia, with long-term current use of insulin ([address]    Vaginal candidiasis    Centrilobular emphysema ([address] Obstructive sleep apnea syndrome    Vitamin D deficiency    Pneumonia due to infectious organism    Parotid mass    Severe persistent asthma    Chronic diastolic congestive heart failure (HCC)    Noncompliance    Hx of non-ST elevation myocardial infarction (NSTEMI)       Medications listed as ordered at the time of discharge from hospital     Medication List            Accurate as of August 10, 2022  3:38 PM. If you have any questions, ask your nurse or doctor.                 START taking these medications      Lantus SoloStar 100 UNIT/ML injection pen  Generic drug: insulin glargine  Inject 20 Units into the skin nightly  Replaces: Lantus 100 UNIT/ML injection vial            CHANGE how you take these medications      Breo Ellipta 200-25 MCG/INH Aepb inhaler  Generic drug: Fluticasone furoate-vilanterol  USE 1 INHALATION ORALLY    DAILY  What changed:   how much to take  when to take this  additional instructions     insulin lispro (1 Unit Dial) 100 UNIT/ML Sopn  Commonly known as: HumaLOG KwikPen  Follow pre meal sliding scale  What changed:   how to take this  when to take this  additional instructions     sertraline 100 MG tablet  Commonly known as: ZOLOFT  !/2 for one week then one po daily  What changed:   how much to take  how to take this  when to take this  additional instructions     vitamin D 1.25 MG (26710 UT) Caps capsule  Commonly known as: ERGOCALCIFEROL  Take 1 capsule by mouth Twice a Week  What changed: when to take this            CONTINUE taking these medications      acetaminophen 500 MG tablet  Commonly known as: TYLENOL     albuterol sulfate  (90 Base) MCG/ACT inhaler  Commonly known as: PROVENTIL;VENTOLIN;PROAIR  Inhale 2 puffs into the lungs every 6 hours as needed for Wheezing     aspirin 81 MG EC tablet     atorvastatin 40 MG tablet  Commonly known as: LIPITOR     carvedilol 6.25 MG tablet  Commonly known as: COREG     clopidogrel 75 MG tablet  Commonly known as: PLAVIX  TAKE 1 TABLET DAILY     Insulin Pen Needle 32G X 5 MM Misc  1 each by Does not apply route daily     Jardiance 25 MG tablet  Generic drug: empagliflozin     montelukast 10 MG tablet  Commonly known as: SINGULAIR  Take 1 tablet by mouth nightly     sacubitril-valsartan 24-26 MG per tablet  Commonly known as: ENTRESTO  Take 1 tablet by mouth 2 times daily     senna 8.6 MG tablet  Commonly known as: SENOKOT            STOP taking these medications      Lantus 100 UNIT/ML injection vial  Generic drug: insulin glargine  Replaced by: Lantus SoloStar 100 UNIT/ML injection pen               Where to Get Your Medications        These medications were sent to [address] [place] [address], [address] [place] [address], [facility], [address]      Phone: [phone]   insulin lispro (1 Unit Dial) 100 UNIT/ML Sopn  Lantus SoloStar 100 UNIT/ML injection pen           Medications marked \"taking\" at this time  Outpatient Medications Marked as Taking for the 8/10/22 encounter (Office Visit) with Jag Tyson MD   Medication Sig Dispense Refill    insulin glargine (LANTUS SOLOSTAR) 100 UNIT/ML injection pen Inject 20 Units into the skin nightly 5 pen 0    insulin lispro, 1 Unit Dial, (HUMALOG KWIKPEN) 100 UNIT/ML SOPN Follow pre meal sliding scale 4 pen 3    carvedilol (COREG) 6.25 MG tablet Take 6.25 mg by mouth in the morning and 6.25 mg in the evening. Take with meals. senna (SENOKOT) 8.6 MG tablet Take 2 tablets by mouth in the morning.       JARDIANCE 25 MG tablet TAKE 1 TABLET BY MOUTH ONCE DAILY      atorvastatin (LIPITOR) 40 MG tablet TAKE 1 TABLET BY MOUTH ONCE DAILY      clopidogrel (PLAVIX) 75 MG tablet TAKE 1 TABLET DAILY 90 tablet 1    montelukast (SINGULAIR) 10 MG tablet Take 1 tablet by mouth nightly 90 tablet 3    sacubitril-valsartan (ENTRESTO) 24-26 MG per tablet Take 1 tablet by mouth 2 times daily 60 tablet 3    sertraline (ZOLOFT) 100 MG tablet !/2 for one week then one po daily (Patient taking differently: Take 100 mg by mouth in the morning.) 30 tablet 5    vitamin D (ERGOCALCIFEROL) 1.25 MG (85166 UT) CAPS capsule Take 1 capsule by mouth Twice a Week (Patient taking differently: Take 50,000 Units by mouth once a week) 24 capsule 1        Medications patient taking as of now reconciled against medications ordered at time of hospital discharge: Yes    A comprehensive review of systems was negative except for what was noted in the HPI. Objective:    /60   Pulse 75   Temp 97.2 °F (36.2 °C)   Wt 243 lb (110.2 kg)   SpO2 96%   BMI 36.95 kg/m²     Physical examination:  Tympanic membranes are clear bilaterally. No anterior or posterior cervical adenopathy. Lungs diminished breath sounds but without wheeze, rhonchi, rales or crackles. Heart is regular but distant. No carotid bruits are detected. Abdomen is obese positive bowel sounds soft nontender. 2+ edema below the mid tibia bilaterally. Rabia Stuart was seen today for other. Diagnoses and all orders for this visit:    Coronary artery disease involving native coronary artery of native heart without angina pectoris    Centrilobular emphysema (HCC)    Mixed hyperlipidemia    Hx of non-ST elevation myocardial infarction (NSTEMI)    Lymphedema    Vitamin D deficiency    Other orders  -     insulin glargine (LANTUS SOLOSTAR) 100 UNIT/ML injection pen; Inject 20 Units into the skin nightly  -     insulin lispro, 1 Unit Dial, (HUMALOG KWIKPEN) 100 UNIT/ML SOPN; Follow pre meal sliding scale  Patient is to be seen back in 1 month. It is important that the patient be compliant with medications. She is encouraged to continue to stop smoking.

## 2022-08-15 ENCOUNTER — TELEPHONE (OUTPATIENT)
Dept: FAMILY MEDICINE CLINIC | Age: 57
End: 2022-08-15

## 2022-08-16 NOTE — TELEPHONE ENCOUNTER
Left a message on the voicemail to call back to the nurse line to find out what the max daily dose of insulin.

## 2022-08-16 NOTE — TELEPHONE ENCOUNTER
Jayashree's message below:      I do not see a sliding scale listed in her chart, this should be most likely a max of 30 units. Please confirm with her how much she is taking on a regular basis.

## 2022-08-17 ENCOUNTER — TELEPHONE (OUTPATIENT)
Dept: FAMILY MEDICINE CLINIC | Age: 57
End: 2022-08-17

## 2022-08-17 NOTE — TELEPHONE ENCOUNTER
Left message for patient to return call.   We need to know the sliding scale given to her from the hospital on her Humalog

## 2022-08-17 NOTE — TELEPHONE ENCOUNTER
Alex Rowell called back and was advised. She is currently admitted into the hospital and does not know her sliding scale. She will contact the pharmacy when she is discharged to home.

## 2022-08-23 ENCOUNTER — CARE COORDINATION (OUTPATIENT)
Dept: CARE COORDINATION | Age: 57
End: 2022-08-23

## 2022-08-23 NOTE — CARE COORDINATION
Ambulatory Care Coordination Note  8/23/2022    ACC: Sharyn Waddell RN    Summary Note: ACM contacted patient. She states she has not received a phone call from the [employer] representative to begin process of enrolling in pre packaged medications. She is also unsure if Shannon Swanson has contacted her. Jeff urbina has recently had an increased dose for her long acting insulin. She is now taking 20 units daily. Jeff urbina states her fasting blood sugar today was 213. She did not test at lunch and ate a a chinese restaurant. She checked her blood sugar with AC on the phone. Reading is 304. Jeff urbina administered her short acting insulin (9 units per sliding scale). ACM encouraged patient to take insulin with her when she goes out and she verbalized understanding. KATTY also educated patient on a diabetic diet following the plate method. She states she understands what to eat but does not always follow the diet. KATTY reviewed with Jeff urbina the consequences of having consistently high blood sugar readings. She voiced understanding. Jeff urbina states she has had to use her rescue inhaler twice this past week. She denies any wheezing, increase in cough/shortness of breath or chest pain. She states she is triggered by humidity and has been staying indoors on muggy days. Jeff urbina states she is waiting on paperwork to be submitted to her insurance company for short term disabiltiy. KATTY contacted Otto Schafer she will call to schedule appointment tomorrow.      Plan  Ensure Script[employer] has begun  Review blood sugar logs  Continue to follow for care coordination  Lab Results       None            Care Coordination Interventions    Referral from Primary Care Provider: No  Suggested Interventions and Community Resources  Fall Risk Prevention: Completed  Medi Set or Pill Pack: Not Started  Registered Dietician: Not Started  Zone Management Tools: Completed (Comment: CHF/DM)          Goals Addressed    None         Prior to Admission medications    Medication Sig Start Date End Date Taking? Authorizing Provider   insulin glargine (LANTUS SOLOSTAR) 100 UNIT/ML injection pen Inject 20 Units into the skin nightly 8/10/22   Noé Shabazz MD   insulin lispro, 1 Unit Dial, (HUMALOG [facility]) 100 UNIT/ML SOPN Follow pre meal sliding scale 8/10/22   Noé Shabazz MD   carvedilol (COREG) 6.25 MG tablet Take 6.25 mg by mouth in the morning and 6.25 mg in the evening. Take with meals. Historical Provider, MD   senna (SENOKOT) 8.6 MG tablet Take 2 tablets by mouth in the morning. Historical Provider, MD   JARDIANCE 25 MG tablet TAKE 1 TABLET BY MOUTH ONCE DAILY 7/6/22   Historical Provider, MD   atorvastatin (LIPITOR) 40 MG tablet TAKE 1 TABLET BY MOUTH ONCE DAILY 7/6/22   Historical Provider, MD   acetaminophen (TYLENOL) 500 MG tablet Take 1,000 mg by mouth every 6 hours as needed for Pain    Historical Provider, MD   clopidogrel (PLAVIX) 75 MG tablet TAKE 1 TABLET DAILY 7/11/22   Noé Shabazz MD   albuterol sulfate  (90 Base) MCG/ACT inhaler Inhale 2 puffs into the lungs every 6 hours as needed for Wheezing 6/1/22   Noé Shabazz MD   montelukast (SINGULAIR) 10 MG tablet Take 1 tablet by mouth nightly 5/18/22   Noé Shabazz MD   Fluticasone furoate-vilanterol (BREO ELLIPTA) 200-25 MCG/INH AEPB inhaler USE 1 INHALATION ORALLY    DAILY  Patient taking differently: 1 puff daily 5/18/22   Noé Shabazz MD   sacubitril-valsartan (ENTRESTO) 24-26 MG per tablet Take 1 tablet by mouth 2 times daily 5/3/22   Noé Shabazz MD   sertraline (ZOLOFT) 100 MG tablet !/2 for one week then one po daily  Patient taking differently: Take 100 mg by mouth in the morning.  5/3/22   Noé Shabazz MD   vitamin D (ERGOCALCIFEROL) 1.25 MG (97741 UT) CAPS capsule Take 1 capsule by mouth Twice a Week  Patient taking differently: Take 50,000 Units by mouth once a week 4/8/21   Noé Shabazz MD   Insulin Pen Needle 32G X 5 MM MISC 1 each by Does not apply route daily 12/10/20   José Luis Montanez MD   aspirin 81 MG EC tablet Take 81 mg by mouth daily    Historical Provider, MD       Future Appointments   Date Time Provider So Fierroi   9/2/2022 10:30 AM MD FREDDY Ordonez [facility]    and   Diabetes Assessment    Medic Alert ID: No  Meal Planning: Other   How often do you test your blood sugar?: Meals   Do you have barriers with adherence to non-pharmacologic self-management interventions?  (Nutrition/Exercise/Self-Monitoring): Yes   Have you ever had to go to the ED for symptoms of low blood sugar?: No       No patient-reported symptoms   Do you have hyperglycemia symptoms?: No   Do you have hypoglycemia symptoms?: No   Last Blood Sugar Value: 203   Blood Sugar Monitoring Regimen: Before Meals   Blood Sugar Trends: No Change

## 2022-08-29 ENCOUNTER — CARE COORDINATION (OUTPATIENT)
Dept: CARE COORDINATION | Age: 57
End: 2022-08-29

## 2022-09-02 ENCOUNTER — OFFICE VISIT (OUTPATIENT)
Dept: FAMILY MEDICINE CLINIC | Age: 57
End: 2022-09-02
Payer: COMMERCIAL

## 2022-09-02 VITALS
HEIGHT: 68 IN | DIASTOLIC BLOOD PRESSURE: 84 MMHG | HEART RATE: 73 BPM | SYSTOLIC BLOOD PRESSURE: 126 MMHG | BODY MASS INDEX: 36.57 KG/M2 | OXYGEN SATURATION: 97 % | RESPIRATION RATE: 18 BRPM | WEIGHT: 241.3 LBS | TEMPERATURE: 97.5 F

## 2022-09-02 DIAGNOSIS — E11.65 TYPE 2 DIABETES MELLITUS WITH HYPERGLYCEMIA, WITH LONG-TERM CURRENT USE OF INSULIN (HCC): ICD-10-CM

## 2022-09-02 DIAGNOSIS — F32.9 REACTIVE DEPRESSION: ICD-10-CM

## 2022-09-02 DIAGNOSIS — Z79.4 TYPE 2 DIABETES MELLITUS WITH HYPERGLYCEMIA, WITH LONG-TERM CURRENT USE OF INSULIN (HCC): ICD-10-CM

## 2022-09-02 DIAGNOSIS — I25.10 CORONARY ARTERY DISEASE INVOLVING NATIVE CORONARY ARTERY OF NATIVE HEART WITHOUT ANGINA PECTORIS: Primary | ICD-10-CM

## 2022-09-02 DIAGNOSIS — R25.3 JERKING: ICD-10-CM

## 2022-09-02 DIAGNOSIS — Z72.0 TOBACCO ABUSE: ICD-10-CM

## 2022-09-02 DIAGNOSIS — Z86.74 HX OF CARDIAC ARREST: ICD-10-CM

## 2022-09-02 DIAGNOSIS — E55.9 VITAMIN D DEFICIENCY: ICD-10-CM

## 2022-09-02 DIAGNOSIS — J43.2 CENTRILOBULAR EMPHYSEMA (HCC): ICD-10-CM

## 2022-09-02 PROCEDURE — 99215 OFFICE O/P EST HI 40 MIN: CPT | Performed by: INTERNAL MEDICINE

## 2022-09-02 PROCEDURE — 3046F HEMOGLOBIN A1C LEVEL >9.0%: CPT | Performed by: INTERNAL MEDICINE

## 2022-09-02 RX ORDER — ERGOCALCIFEROL 1.25 MG/1
50000 CAPSULE ORAL WEEKLY
Qty: 4 CAPSULE | Refills: 0 | Status: SHIPPED | OUTPATIENT
Start: 2022-09-02

## 2022-09-02 RX ORDER — ERGOCALCIFEROL 1.25 MG/1
50000 CAPSULE ORAL
Qty: 24 CAPSULE | Refills: 1 | Status: SHIPPED
Start: 2022-09-05 | End: 2022-09-17 | Stop reason: SDUPTHER

## 2022-09-02 RX ORDER — SERTRALINE HYDROCHLORIDE 100 MG/1
100 TABLET, FILM COATED ORAL DAILY
Qty: 90 TABLET | Refills: 1 | Status: SHIPPED
Start: 2022-09-02 | End: 2022-09-17 | Stop reason: SDUPTHER

## 2022-09-02 SDOH — ECONOMIC STABILITY: FOOD INSECURITY: WITHIN THE PAST 12 MONTHS, THE FOOD YOU BOUGHT JUST DIDN'T LAST AND YOU DIDN'T HAVE MONEY TO GET MORE.: NEVER TRUE

## 2022-09-02 SDOH — ECONOMIC STABILITY: FOOD INSECURITY: WITHIN THE PAST 12 MONTHS, YOU WORRIED THAT YOUR FOOD WOULD RUN OUT BEFORE YOU GOT MONEY TO BUY MORE.: NEVER TRUE

## 2022-09-02 ASSESSMENT — SOCIAL DETERMINANTS OF HEALTH (SDOH): HOW HARD IS IT FOR YOU TO PAY FOR THE VERY BASICS LIKE FOOD, HOUSING, MEDICAL CARE, AND HEATING?: NOT HARD AT ALL

## 2022-09-02 NOTE — PROGRESS NOTES
Status post hospitalization for acute myocardial infarction. Patient initially was seen in [facility]. I do have some labs from that institution but she was transferred to [facility] and I am requesting records from there. The patient was seen by her cardiologist there and testing was done. I do not know the result of that. The patient recently had a cardiac arrest which resulted in anoxic encephalopathy to some degree which is now developed into the jerking. The question is whether this might be seizure-like activity. The patient denies any further cardiac symptomatology. She denies orthopnea or PND. She has noted no increase in peripheral lymphedema. She denies coughing or sputum production. She did have a CT of the chest done and I did review this with her. The patient is still attending physical therapy. We discussed neurologic evaluation for this jerking/tic phenomenon which is currently ongoing. She states that she did have several episodes yesterday where she \"was out of it\". ASSESSMENT/PLAN:   Coronary artery disease involving native coronary artery of native heart without angina pectoris  [facility] [address] Neurology  Centrilobular emphysema [facility])  Type 2 diabetes mellitus with hyperglycemia, with long-term current use of insulin ([facility])  Tobacco abuse  Vitamin D deficiency  -     vitamin D (ERGOCALCIFEROL) 1.25 MG (77318 UT) CAPS capsule; Take 1 capsule by mouth Twice a Week, Disp-24 capsule, R-1Normal  -     vitamin D (ERGOCALCIFEROL) 1.25 MG (97250 UT) CAPS capsule; Take 1 capsule by mouth once a week, Disp-4 capsule, R-0Normal   of cardiac arrest  MEDICINE [facility] Neurology  Reactive depression  -     sertraline (ZOLOFT) 100 MG tablet;  Take 1 tablet by mouth daily, Disp-90 tablet, R-1Normal           Subjective:     Patient Active Problem List   Diagnosis    Disorder of intervertebral disc of lumbar spine    Greater trochanteric bursitis of both hips    Pain of both hip albuterol sulfate  (90 Base) MCG/ACT inhaler  Commonly known as: PROVENTIL;VENTOLIN;PROAIR  Inhale 2 puffs into the lungs every 6 hours as needed for Wheezing     aspirin 81 MG EC tablet     atorvastatin 40 MG tablet  Commonly known as: LIPITOR     carvedilol 6.25 MG tablet  Commonly known as: COREG     clopidogrel 75 MG tablet  Commonly known as: PLAVIX  TAKE 1 TABLET DAILY     insulin lispro (1 Unit Dial) 100 UNIT/ML Sopn  Commonly known as: HumaLOG KwikPen  Follow pre meal sliding scale     Insulin Pen Needle 32G X 5 MM Misc  1 each by Does not apply route daily     Jardiance 25 MG tablet  Generic drug: empagliflozin     Lantus SoloStar 100 UNIT/ML injection pen  Generic drug: insulin glargine  Inject 20 Units into the skin nightly     montelukast 10 MG tablet  Commonly known as: SINGULAIR  Take 1 tablet by mouth nightly     sacubitril-valsartan 24-26 MG per tablet  Commonly known as: ENTRESTO  Take 1 tablet by mouth 2 times daily     senna 8.6 MG tablet  Commonly known as: SENOKOT     sertraline 100 MG tablet  Commonly known as: ZOLOFT  Take 1 tablet by mouth daily               Where to Get Your Medications        These medications were sent to [address] [place] [address], [address] [place] [address] Se, [address] [place] [address]      Phone: [phone]   sertraline 100 MG tablet  vitamin D 1.25 MG (13182 UT) Caps capsule       These medications were sent to [address] [place] [address], [address] [place] [address]      Phone: [phone]   vitamin D 1.25 MG (52333 UT) Caps capsule           Medications marked \"taking\" at this time  Outpatient Medications Marked as Taking for the 9/2/22 encounter (Office Visit) with Charity Abdullahi MD   Medication Sig Dispense Refill    [START ON 9/5/2022] vitamin D (ERGOCALCIFEROL) 1.25 MG (11167 UT) CAPS capsule Take 1 capsule by mouth Twice a Week 24 capsule 1    sertraline (ZOLOFT) 100 MG tablet Take 1 tablet by mouth daily 90 tablet 1    vitamin D (ERGOCALCIFEROL) 1.25 MG (63349 UT) CAPS capsule Take 1 capsule by mouth once a week 4 capsule 0    insulin glargine (LANTUS SOLOSTAR) 100 UNIT/ML injection pen Inject 20 Units into the skin nightly 5 pen 0    insulin lispro, 1 Unit Dial, (HUMALOG KWIKPEN) 100 UNIT/ML SOPN Follow pre meal sliding scale 4 pen 3    carvedilol (COREG) 6.25 MG tablet Take 6.25 mg by mouth in the morning and 6.25 mg in the evening. Take with meals. JARDIANCE 25 MG tablet TAKE 1 TABLET BY MOUTH ONCE DAILY      atorvastatin (LIPITOR) 40 MG tablet TAKE 1 TABLET BY MOUTH ONCE DAILY      acetaminophen (TYLENOL) 500 MG tablet Take 1,000 mg by mouth every 6 hours as needed for Pain      clopidogrel (PLAVIX) 75 MG tablet TAKE 1 TABLET DAILY 90 tablet 1    albuterol sulfate  (90 Base) MCG/ACT inhaler Inhale 2 puffs into the lungs every 6 hours as needed for Wheezing 1 each 2    montelukast (SINGULAIR) 10 MG tablet Take 1 tablet by mouth nightly 90 tablet 3    Fluticasone furoate-vilanterol (BREO ELLIPTA) 200-25 MCG/INH AEPB inhaler USE 1 INHALATION ORALLY    DAILY (Patient taking differently: 1 puff daily) 3 each 3    sacubitril-valsartan (ENTRESTO) 24-26 MG per tablet Take 1 tablet by mouth 2 times daily 60 tablet 3    Insulin Pen Needle 32G X 5 MM MISC 1 each by Does not apply route daily 100 each 3    aspirin 81 MG EC tablet Take 81 mg by mouth daily          Medications patient taking as of now reconciled against medications ordered at time of hospital discharge: Yes    A comprehensive review of systems was negative except for what was noted in the HPI.     Objective:    /84   Pulse 73   Temp 97.5 °F (36.4 °C) (Temporal)   Resp 18   Ht 5' 8\" (1.727 m)   Wt 241 lb 4.8 oz (109.5 kg)   SpO2 97%   BMI 36.69 kg/m²     Physical examination:  Tympanic membranes are clear bilaterally. No anterior or posterior cervical adenopathy. Lungs diminished breath sounds but without wheeze, rhonchi, rales or crackles. Heart is regular but distant. No carotid bruits are detected. Abdomen is obese positive bowel sounds soft nontender. 2+ edema below the mid tibia bilaterally. Iris Becker was seen today for medication refill. Diagnoses and all orders for this visit:    Coronary artery disease involving native coronary artery of native heart without angina pectoris    [address] Neurology    Centrilobular emphysema [facility])    Type 2 diabetes mellitus with hyperglycemia, with long-term current use of insulin ([facility])    Tobacco abuse    Vitamin D deficiency  -     vitamin D (ERGOCALCIFEROL) 1.25 MG (33560 UT) CAPS capsule; Take 1 capsule by mouth Twice a Week  -     vitamin D (ERGOCALCIFEROL) 1.25 MG (87923 UT) CAPS capsule; Take 1 capsule by mouth once a week     of cardiac arrest  MEDICINE [facility] Neurology    Reactive depression  -     sertraline (ZOLOFT) 100 MG tablet; Take 1 tablet by mouth daily  Patient blood sugars are still running too high and I have asked her to increase her Lantus to 25 units. I will have her assessed by neurology. The patient at this point time I do not believe would tolerate an MRI and a lot of artifact would occur because of her jerking. EEG possibly as the next test.  The patient is to be seen back in several weeks. A total of 50 minutes was spent on this visit. Records were reviewed.   The patient is to be seen back on 2 weeks

## 2022-09-12 ENCOUNTER — CARE COORDINATION (OUTPATIENT)
Dept: CARE COORDINATION | Age: 57
End: 2022-09-12

## 2022-09-14 ENCOUNTER — CARE COORDINATION (OUTPATIENT)
Dept: CARE COORDINATION | Age: 57
End: 2022-09-14

## 2022-09-16 ENCOUNTER — TELEPHONE (OUTPATIENT)
Dept: FAMILY MEDICINE CLINIC | Age: 57
End: 2022-09-16

## 2022-09-16 DIAGNOSIS — F32.9 REACTIVE DEPRESSION: ICD-10-CM

## 2022-09-16 DIAGNOSIS — E55.9 VITAMIN D DEFICIENCY: ICD-10-CM

## 2022-09-16 RX ORDER — ISOSORBIDE DINITRATE 30 MG/1
TABLET ORAL
COMMUNITY
Start: 2022-08-25 | End: 2022-09-17 | Stop reason: SDUPTHER

## 2022-09-16 NOTE — TELEPHONE ENCOUNTER
This is a pre-packaged medication to help with patient compliance. I will be doing a separate RX request and send pended medications to you.

## 2022-09-16 NOTE — TELEPHONE ENCOUNTER
Last Appointment:  9/2/2022  Future Appointments   Date Time Provider So Fierroi   9/29/2022  3:00 PM Jag Tyson MD [facility]       [employer] RX for medication compliance requested these medications to be written and sent for a 28 day with 5 refills. The forms said she was on jardiance 10mg but we had 25mg.   I called the patient to tell me what she had at home and she said 10mg     Also, Isosorbide was listed but not in our chart, looks like it was prescribed 8/25

## 2022-09-16 NOTE — TELEPHONE ENCOUNTER
[facility] called from [employer] [facility]. She as wanting to see if a form for a prescription refill was received from her?

## 2022-09-17 RX ORDER — ALBUTEROL SULFATE 90 UG/1
2 AEROSOL, METERED RESPIRATORY (INHALATION) EVERY 6 HOURS PRN
Qty: 1 EACH | Refills: 5 | Status: SHIPPED | OUTPATIENT
Start: 2022-09-17

## 2022-09-17 RX ORDER — ERGOCALCIFEROL 1.25 MG/1
50000 CAPSULE ORAL
Qty: 28 CAPSULE | Refills: 5 | Status: SHIPPED | OUTPATIENT
Start: 2022-09-19

## 2022-09-17 RX ORDER — MONTELUKAST SODIUM 10 MG/1
10 TABLET ORAL NIGHTLY
Qty: 28 TABLET | Refills: 5 | Status: SHIPPED | OUTPATIENT
Start: 2022-09-17

## 2022-09-17 RX ORDER — INSULIN GLARGINE 100 [IU]/ML
20 INJECTION, SOLUTION SUBCUTANEOUS NIGHTLY
Qty: 5 ADJUSTABLE DOSE PRE-FILLED PEN SYRINGE | Refills: 5 | Status: SHIPPED | OUTPATIENT
Start: 2022-09-17

## 2022-09-17 RX ORDER — FLUTICASONE FUROATE AND VILANTEROL 200; 25 UG/1; UG/1
POWDER RESPIRATORY (INHALATION)
Qty: 3 EACH | Refills: 3 | Status: SHIPPED | OUTPATIENT
Start: 2022-09-17

## 2022-09-17 RX ORDER — ISOSORBIDE DINITRATE 30 MG/1
30 TABLET ORAL DAILY
Qty: 28 TABLET | Refills: 5 | Status: SHIPPED | OUTPATIENT
Start: 2022-09-17

## 2022-09-17 RX ORDER — CLOPIDOGREL BISULFATE 75 MG/1
TABLET ORAL
Qty: 28 TABLET | Refills: 5 | Status: SHIPPED | OUTPATIENT
Start: 2022-09-17

## 2022-09-17 RX ORDER — ASPIRIN 81 MG/1
81 TABLET ORAL DAILY
Qty: 28 TABLET | Refills: 5 | Status: SHIPPED | OUTPATIENT
Start: 2022-09-17

## 2022-09-17 RX ORDER — SERTRALINE HYDROCHLORIDE 100 MG/1
100 TABLET, FILM COATED ORAL DAILY
Qty: 28 TABLET | Refills: 5 | Status: SHIPPED | OUTPATIENT
Start: 2022-09-17

## 2022-09-17 RX ORDER — INSULIN LISPRO 100 [IU]/ML
INJECTION, SOLUTION INTRAVENOUS; SUBCUTANEOUS
Qty: 4 ADJUSTABLE DOSE PRE-FILLED PEN SYRINGE | Refills: 5 | Status: SHIPPED | OUTPATIENT
Start: 2022-09-17

## 2022-09-17 RX ORDER — CARVEDILOL 6.25 MG/1
6.25 TABLET ORAL 2 TIMES DAILY WITH MEALS
Qty: 56 TABLET | Refills: 5 | Status: SHIPPED | OUTPATIENT
Start: 2022-09-17

## 2022-09-26 ENCOUNTER — CARE COORDINATION (OUTPATIENT)
Dept: CARE COORDINATION | Age: 57
End: 2022-09-26

## 2022-09-29 ENCOUNTER — OFFICE VISIT (OUTPATIENT)
Dept: PRIMARY CARE CLINIC | Age: 57
End: 2022-09-29
Payer: COMMERCIAL

## 2022-09-29 VITALS
SYSTOLIC BLOOD PRESSURE: 128 MMHG | DIASTOLIC BLOOD PRESSURE: 72 MMHG | BODY MASS INDEX: 36.53 KG/M2 | WEIGHT: 241 LBS | TEMPERATURE: 97.2 F | HEIGHT: 68 IN | HEART RATE: 88 BPM | OXYGEN SATURATION: 97 %

## 2022-09-29 DIAGNOSIS — Z86.74 HX OF CARDIAC ARREST: Primary | ICD-10-CM

## 2022-09-29 DIAGNOSIS — J43.2 CENTRILOBULAR EMPHYSEMA (HCC): ICD-10-CM

## 2022-09-29 DIAGNOSIS — I25.10 CORONARY ARTERY DISEASE INVOLVING NATIVE CORONARY ARTERY OF NATIVE HEART WITHOUT ANGINA PECTORIS: ICD-10-CM

## 2022-09-29 DIAGNOSIS — E78.2 MIXED HYPERLIPIDEMIA: ICD-10-CM

## 2022-09-29 PROCEDURE — 99214 OFFICE O/P EST MOD 30 MIN: CPT | Performed by: INTERNAL MEDICINE

## 2022-09-29 NOTE — PROGRESS NOTES
ASSESSMENT/PLAN:   Hx of cardiac arrest  -     Lipid Panel; Future  -     Hemoglobin A1C; Future  -     Comprehensive Metabolic Panel; Future  -     CBC with Auto Differential; Future  Mixed hyperlipidemia  -     Lipid Panel; Future  -     Hemoglobin A1C; Future  -     Comprehensive Metabolic Panel; Future  -     CBC with Auto Differential; Future  Coronary artery disease involving native coronary artery of native heart without angina pectoris  -     Lipid Panel; Future  -     Hemoglobin A1C; Future  -     Comprehensive Metabolic Panel; Future  -     CBC with Auto Differential; Future  Centrilobular emphysema (HCC)  -     Lipid Panel; Future  -     Hemoglobin A1C; Future  -     Comprehensive Metabolic Panel; Future  -     CBC with Auto Differential; Future           Subjective:   HPI: 3-week follow-up. Patient seems to be doing a bit better. Interval history/Current status: Patient complained of shortness of breath the day of the cardiac arrest.  Driven to the hospital by her . On route the patient stopped breathing. She was in full cardiac arrest when she arrived in the emergency room at [facility].  Patient was then intubated and life flighted to [facility]. Patient was cooled per protocol. She then underwent heart catheterization with angioplasty and stent x2. (Please see cardiac cath report from [facility]) patient was seen by neurology had CT of the brain. She also was evaluated by speech. The patient did aspirate clear liquids. She was not discharged on Thick-It. She was seen by speech pathology and acute rehab facility. The patient is still having problems with dysarthria. She is denying however that she has problems with choking or coughing with eating. She has an unsteady gait and did fall within the last week. She did not strike her head. She bruised her left arm. Blood sugars at home have been under somewhat reasonable control although not ideal control.   Her blood sugars have been running in the high 100s to low 200 range. Currently she was only taking 10 units of Lantus + Humalog sliding scale before meals. The patient is advised to be start 15 units of Lantus and continue to follow the sliding scale. I did question because of some clouded mentation whether or not the patient was actually following her medication compliance. We did go over the medications with both the patient and the  to try to assure as best we could that the patient was taking her medications correctly. Reconciliation of medicines with discharge from acute rehab was performed. The patient currently seems to be breathing okay. She does usually struggle with this warmer weather. She does use oxygen if her saturations drop but they have not done so. Her breathing medications are also reviewed. She does use a CPAP at home and she states she is compliant with this. August 10, 2022 update patient is better with cognition. She was able to rise up from a chair by herself today and she was not able to do this last time. We will increase Lantus today. She agrees to prepackaged medications. I have spoken to the care coordinator regarding this. The patient will need repeat labs next time to see whether medical compliance is being followed. (9/29/2022) : Update. The patient continues to attend physical therapy and Occupational Therapy. She states her blood sugars have been running under 200 but perhaps not ideal.  Patient states that she has been compliant with medication. She did see her pulmonologist last week and her FEV1 did increase from 31% to 61%. Patient has stopped smoking. Last hemoglobin A1c was back in June and this will be repeated today. I believe she probably will need further adjustment of her insulin therapy. Patient was fairly unsteady getting up to the exam table. She continues to have some slurring of her speech and her mentation is not back to baseline.   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 I would question whether or not the patient will be able to return to that occupational level. She is improving slightly since her last visit and so I think several more months of therapy is needed before trying to return to work. She may need physiatry work-up with a list of her occupational duties to be sure she is capable of doing them. This may need to be coordinated with occupational therapy also. Patient Active Problem List   Diagnosis    Disorder of intervertebral disc of lumbar spine    Greater trochanteric bursitis of both hips    Pain of both hip joints    Lymphedema    Hyperlipidemia    Tobacco abuse    Acute exacerbation of chronic obstructive pulmonary disease (COPD) ([facility]    CAD (coronary artery disease)    Type 2 diabetes mellitus with hyperglycemia, with long-term current use of insulin ([facility])    Vaginal candidiasis    Centrilobular emphysema ([facility]    Obstructive sleep apnea syndrome    Vitamin D deficiency    Pneumonia due to infectious organism    Parotid mass    Severe persistent asthma    Chronic diastolic congestive heart failure ([facility]    Noncompliance    Hx of non-ST elevation myocardial infarction (NSTEMI)    Jerking    Hx of cardiac arrest    Reactive depression       Medications listed as ordered at the time of discharge from hospital     Medication List            Accurate as of September 29, 2022  5:21 PM. If you have any questions, ask your nurse or doctor.                 CONTINUE taking these medications      acetaminophen 500 MG tablet  Commonly known as: TYLENOL     albuterol sulfate  (90 Base) MCG/ACT inhaler  Commonly known as: PROVENTIL;VENTOLIN;PROAIR  Inhale 2 puffs into the lungs every 6 hours as needed for Wheezing     aspirin 81 MG EC tablet  Take 1 tablet by mouth daily     atorvastatin 40 MG tablet  Commonly known as: LIPITOR     carvedilol 6.25 MG tablet  Commonly known as: COREG  Take 1 tablet by mouth 2 times daily (with meals)     clopidogrel 75 MG tablet  Commonly known as: PLAVIX  TAKE 1 TABLET DAILY     Fluticasone furoate-vilanterol 200-25 MCG/INH Aepb inhaler  Commonly known as: Breo Ellipta  USE 1 INHALATION ORALLY    DAILY     insulin lispro (1 Unit Dial) 100 UNIT/ML Sopn  Commonly known as: HumaLOG KwikPen  Follow pre meal sliding scaleFollow pre meal sliding scale     Insulin Pen Needle 32G X 5 MM Misc  1 each by Does not apply route daily     isosorbide dinitrate 30 MG tablet  Commonly known as: ISORDIL  Take 1 tablet by mouth daily     * Jardiance 25 MG tablet  Generic drug: empagliflozin     * empagliflozin 10 MG tablet  Commonly known as: Jardiance  Take 1 tablet by mouth daily     Lantus SoloStar 100 UNIT/ML injection pen  Generic drug: insulin glargine  Inject 20 Units into the skin nightly Inject 20 Units into the skin nightly     montelukast 10 MG tablet  Commonly known as: SINGULAIR  Take 1 tablet by mouth nightly     sacubitril-valsartan 24-26 MG per tablet  Commonly known as: ENTRESTO  Take 1 tablet by mouth 2 times daily     senna 8.6 MG tablet  Commonly known as: SENOKOT     sertraline 100 MG tablet  Commonly known as: ZOLOFT  Take 1 tablet by mouth daily     * vitamin D 1.25 MG (27609 UT) Caps capsule  Commonly known as: ERGOCALCIFEROL  Take 1 capsule by mouth once a week     * vitamin D 1.25 MG (36684 UT) Caps capsule  Commonly known as: ERGOCALCIFEROL  Take 1 capsule by mouth Twice a Week           * This list has 4 medication(s) that are the same as other medications prescribed for you. Read the directions carefully, and ask your doctor or other care provider to review them with you.                     Medications marked \"taking\" at this time  No outpatient medications have been marked as taking for the 9/29/22 encounter (Office Visit) with Lauren Talley MD.        Medications patient taking as of now reconciled against medications ordered at time of hospital discharge: Yes    A comprehensive review of systems was negative except for what was noted in the HPI. Objective:    /72   Pulse 88   Temp 97.2 °F (36.2 °C)   Ht 5' 8\" (1.727 m)   Wt 241 lb (109.3 kg)   SpO2 97%   BMI 36.64 kg/m²     Physical examination:  Tympanic membranes are clear bilaterally. No anterior or posterior cervical adenopathy. Lungs diminished breath sounds but without wheeze, rhonchi, rales or crackles. Heart is regular but distant. No carotid bruits are detected. Abdomen is obese positive bowel sounds soft nontender. 2+ edema below the mid tibia bilaterally. Janice Marino was seen today for follow-up. Diagnoses and all orders for this visit:    Hx of cardiac arrest  -     Lipid Panel; Future  -     Hemoglobin A1C; Future  -     Comprehensive Metabolic Panel; Future  -     CBC with Auto Differential; Future    Mixed hyperlipidemia  -     Lipid Panel; Future  -     Hemoglobin A1C; Future  -     Comprehensive Metabolic Panel; Future  -     CBC with Auto Differential; Future    Coronary artery disease involving native coronary artery of native heart without angina pectoris  -     Lipid Panel; Future  -     Hemoglobin A1C; Future  -     Comprehensive Metabolic Panel; Future  -     CBC with Auto Differential; Future    Centrilobular emphysema (HCC)  -     Lipid Panel; Future  -     Hemoglobin A1C; Future  -     Comprehensive Metabolic Panel; Future  -     CBC with Auto Differential; Future  Patient is making slow but steady progress. I do not think we will see much of a change over the next month and I have given her off until December 1. She does have an appointment in late November to reassess. Forms were completed. Labs will be addressed as they become available. She does have an appointment with her cardiologist within the next 2 weeks.

## 2022-10-07 ENCOUNTER — TELEPHONE (OUTPATIENT)
Dept: FAMILY MEDICINE CLINIC | Age: 57
End: 2022-10-07

## 2022-10-07 DIAGNOSIS — I46.9 CARDIAC ARREST (HCC): Primary | ICD-10-CM

## 2022-10-10 NOTE — TELEPHONE ENCOUNTER
Per Mercedes Paiz note in the referral: 10/07/2022 Faxed referral, progress note, demographics, insurance card to [facility] at [phone]

## 2022-11-03 ENCOUNTER — CARE COORDINATION (OUTPATIENT)
Dept: CARE COORDINATION | Age: 57
End: 2022-11-03

## 2022-11-03 ENCOUNTER — TELEPHONE (OUTPATIENT)
Dept: FAMILY MEDICINE CLINIC | Age: 57
End: 2022-11-03

## 2022-11-03 NOTE — TELEPHONE ENCOUNTER
Received letter from Physical Therapy, notice of discharge for non-compliance. Can you reach out to patient and follow up? This document scanned into chart.

## 2022-11-04 NOTE — CARE COORDINATION
Ambulatory Care Coordination Note  2022    ACC: Sandra Temple, RN    ACM contacted Nanda Mckenzie after receiving message from office that she has not been attending therapy and has been discharged from services. Nanda Mckenzie states she does not feel she is gaining anything by attending therapy. She states \"[place]\". She is not interested in pursuing further therapy despite ACM educating patient on the benefits of attending therapy. Nanda Mckenzie states she has not been checking her blood sugar routinely. She denies signs or symptoms of hyper/hypoglycemia  She states her blood sugars fluctuate but is unable to give ACM any specific readings. She admits to not following her diet closely. ACM offered referral to dietician and patient is agreeable. Patient states her COPD is at baseline. She becomes short of breath with exertion and paces her activities. She uses her rescue inhaler once daily (baseline). Heart Failure Education outreach Date/Time: 2022 9:35 AM    Ambulatory Care Manager (ACM) contacted the patient by telephone to perform Ambulatory Care Coordination. Verified name and  with patient as identifiers. Provided introduction to self, and explanation of the Ambulatory Care Manager's role. ACM reviewed that a Health Healthy tips for the Holiday packet has been mailed to the them. ACM reviewed CHF zones, daily weights, fluid restriction, the importance of low sodium diet, and healthy tips packet with the patient. Instructed patient to call their Cardiologist if they have a weight gain of 3 lbs in 2 days or 5 lbs in a week. Patient reminded that there is a physician on call 24 hours a day / 7 days a week should the patient have questions or concerns. The patient verbalized understanding. Offered patient enrollment in the Remote Patient Monitoring (RPM) program for in-home monitoring: Patient declined.     Plan  Check compliance (meds, blood sugars,daily weights)  Check status of COPD and CHF  Continue to follow for care coordination    Lab Results       None            Care Coordination Interventions    Referral from Primary Care Provider: No  Suggested Interventions and Community Resources  Fall Risk Prevention: Completed  Medi Set or Pill Pack: Not Started  Registered Dietician: Not Started  Zone Management Tools: Completed (Comment: CHF/DM)          Goals Addressed    None         Prior to Admission medications    Medication Sig Start Date End Date Taking?  Authorizing Provider   montelukast (SINGULAIR) 10 MG tablet Take 1 tablet by mouth nightly 9/17/22   Abraham Carias MD   sacubitril-valsartan (ENTRESTO) 24-26 MG per tablet Take 1 tablet by mouth 2 times daily 9/17/22   Abraham Carias MD   empagliflozin (JARDIANCE) 10 MG tablet Take 1 tablet by mouth daily 9/17/22   Abraham Carias MD   clopidogrel (PLAVIX) 75 MG tablet TAKE 1 TABLET DAILY 9/17/22   Abraham Carias MD   carvedilol (COREG) 6.25 MG tablet Take 1 tablet by mouth 2 times daily (with meals) 9/17/22   Abraham Carias MD   sertraline (ZOLOFT) 100 MG tablet Take 1 tablet by mouth daily 9/17/22   Abraham Carias MD   vitamin D (ERGOCALCIFEROL) 1.25 MG (80470 UT) CAPS capsule Take 1 capsule by mouth Twice a Week 9/19/22   Abraham Carias MD   isosorbide dinitrate (ISORDIL) 30 MG tablet Take 1 tablet by mouth daily 9/17/22   Abraham Carias MD   aspirin 81 MG EC tablet Take 1 tablet by mouth daily 9/17/22   Abraham Carias MD   insulin glargine (LANTUS SOLOSTAR) 100 UNIT/ML injection pen Inject 20 Units into the skin nightly Inject 20 Units into the skin nightly 9/17/22   Abraham Carias MD   insulin lispro, 1 Unit Dial, (HUMALOG KWIKPEN) 100 UNIT/ML SOPN Follow pre meal sliding scaleFollow pre meal sliding scale 9/17/22   Abraham Carias MD   Fluticasone furoate-vilanterol (BREO ELLIPTA) 200-25 MCG/INH AEPB inhaler USE 1 INHALATION ORALLY    DAILY 9/17/22   Abraham Carias MD   albuterol sulfate HFA (PROVENTIL;VENTOLIN;PROAIR) 108 (90 Base) MCG/ACT inhaler Inhale 2 puffs into the lungs every 6 hours as needed for Wheezing 9/17/22   Beverley Meng MD   Insulin Pen Needle 32G X 5 MM MISC 1 each by Does not apply route daily 9/17/22   Beverley Meng MD   vitamin D (ERGOCALCIFEROL) 1.25 MG (56195 UT) CAPS capsule Take 1 capsule by mouth once a week 9/2/22   Beverley Meng MD   senna (SENOKOT) 8.6 MG tablet Take 2 tablets by mouth in the morning. Patient not taking: Reported on 9/2/2022    Historical Provider, MD   JARDIANCE 25 MG tablet TAKE 1 TABLET BY MOUTH ONCE DAILY 7/6/22   Historical Provider, MD   atorvastatin (LIPITOR) 40 MG tablet TAKE 1 TABLET BY MOUTH ONCE DAILY 7/6/22   Historical Provider, MD   acetaminophen (TYLENOL) 500 MG tablet Take 1,000 mg by mouth every 6 hours as needed for Pain    Historical Provider, MD       Future Appointments   Date Time Provider So Cyndi   11/28/2022  1:00 PM MD FREDDY Fernando [facility]   ,   Diabetes Assessment    Medic Alert ID: No  Meal Planning: Other   How often do you test your blood sugar?: Meals   Do you have barriers with adherence to non-pharmacologic self-management interventions?  (Nutrition/Exercise/Self-Monitoring): Yes   Have you ever had to go to the ED for symptoms of low blood sugar?: No       No patient-reported symptoms   Do you have hyperglycemia symptoms?: No   Do you have hypoglycemia symptoms?: No        ,   Congestive Heart Failure Assessment    Are you currently restricting fluids?: No Restriction  Do you understand a low sodium diet?: Yes  Do you understand how to read food labels?: Yes  How many restaurant meals do you eat per week?: 0  Do you salt your food before tasting it?: No     No patient-reported symptoms      Symptoms:  CHF associated dyspnea on exertion: Pos      Patient-reported weight (lb):  (Comment: not weighing self)     , and   COPD Assessment    Does the patient understand envrionmental exposure?: Yes  Is the patient able to verbalize Rescue vs. Long Acting medications?: Yes  Does the patient have a nebulizer?: Yes  Does the patient use a space with inhaled medications?: No     No patient-reported symptoms         Symptoms:  None: Yes      Symptom course: stable  Increase use of rapid acting/rescue inhaled medications?: No (Comment: baseline once per day)

## 2022-11-25 RX ORDER — GLIMEPIRIDE 4 MG/1
TABLET ORAL
Qty: 180 TABLET | Refills: 1 | OUTPATIENT
Start: 2022-11-25

## 2022-11-25 NOTE — TELEPHONE ENCOUNTER
Last Appointment:  9/29/2022  Future Appointments   Date Time Provider So Fierroi   11/29/2022  8:30 AM Kehinde Higuera  [address] [place] [address]   12/9/2022 10:00 AM SANDRA Chahal - CNP [facility] -

## 2022-11-29 ENCOUNTER — OFFICE VISIT (OUTPATIENT)
Dept: FAMILY MEDICINE CLINIC | Age: 57
End: 2022-11-29
Payer: COMMERCIAL

## 2022-11-29 VITALS
HEART RATE: 89 BPM | DIASTOLIC BLOOD PRESSURE: 76 MMHG | SYSTOLIC BLOOD PRESSURE: 128 MMHG | TEMPERATURE: 98 F | BODY MASS INDEX: 39.23 KG/M2 | WEIGHT: 258 LBS | OXYGEN SATURATION: 95 %

## 2022-11-29 DIAGNOSIS — J43.2 CENTRILOBULAR EMPHYSEMA (HCC): ICD-10-CM

## 2022-11-29 DIAGNOSIS — E78.2 MIXED HYPERLIPIDEMIA: ICD-10-CM

## 2022-11-29 DIAGNOSIS — R10.2 PELVIC PAIN: ICD-10-CM

## 2022-11-29 DIAGNOSIS — E11.65 TYPE 2 DIABETES MELLITUS WITH HYPERGLYCEMIA, WITH LONG-TERM CURRENT USE OF INSULIN (HCC): ICD-10-CM

## 2022-11-29 DIAGNOSIS — G47.33 OBSTRUCTIVE SLEEP APNEA SYNDROME: ICD-10-CM

## 2022-11-29 DIAGNOSIS — I25.10 CORONARY ARTERY DISEASE INVOLVING NATIVE CORONARY ARTERY OF NATIVE HEART WITHOUT ANGINA PECTORIS: ICD-10-CM

## 2022-11-29 DIAGNOSIS — Z79.4 TYPE 2 DIABETES MELLITUS WITH HYPERGLYCEMIA, WITH LONG-TERM CURRENT USE OF INSULIN (HCC): ICD-10-CM

## 2022-11-29 DIAGNOSIS — M53.3 COCCYDYNIA: Primary | ICD-10-CM

## 2022-11-29 LAB — HBA1C MFR BLD: 9.3 %

## 2022-11-29 PROCEDURE — 3046F HEMOGLOBIN A1C LEVEL >9.0%: CPT | Performed by: INTERNAL MEDICINE

## 2022-11-29 PROCEDURE — 99215 OFFICE O/P EST HI 40 MIN: CPT | Performed by: INTERNAL MEDICINE

## 2022-11-29 PROCEDURE — 83036 HEMOGLOBIN GLYCOSYLATED A1C: CPT | Performed by: INTERNAL MEDICINE

## 2022-11-29 RX ORDER — TRAMADOL HYDROCHLORIDE 50 MG/1
50 TABLET ORAL EVERY 4 HOURS PRN
Qty: 30 TABLET | Refills: 0 | Status: SHIPPED | OUTPATIENT
Start: 2022-11-29 | End: 2022-12-04

## 2022-11-29 RX ORDER — INSULIN GLARGINE 100 [IU]/ML
25 INJECTION, SOLUTION SUBCUTANEOUS NIGHTLY
Qty: 5 ADJUSTABLE DOSE PRE-FILLED PEN SYRINGE | Refills: 5 | Status: SHIPPED
Start: 2022-11-29

## 2022-11-29 NOTE — PROGRESS NOTES
ASSESSMENT/PLAN:              Subjective:   HPI: 3-week follow-up. Patient seems to be doing a bit better. Interval history/Current status: Patient complained of shortness of breath the day of the cardiac arrest.  Driven to the hospital by her . On route the patient stopped breathing. She was in full cardiac arrest when she arrived in the emergency room at [facility].  Patient was then intubated and life flighted to [facility]. Patient was cooled per protocol. She then underwent heart catheterization with angioplasty and stent x2. (Please see cardiac cath report from [facility]) patient was seen by neurology had CT of the brain. She also was evaluated by speech. The patient did aspirate clear liquids. She was not discharged on Thick-It. She was seen by speech pathology and acute rehab facility. The patient is still having problems with dysarthria. She is denying however that she has problems with choking or coughing with eating. She has an unsteady gait and did fall within the last week. She did not strike her head. She bruised her left arm. Blood sugars at home have been under somewhat reasonable control although not ideal control. Her blood sugars have been running in the high 100s to low 200 range. Currently she was only taking 10 units of Lantus + Humalog sliding scale before meals. The patient is advised to be start 15 units of Lantus and continue to follow the sliding scale. I did question because of some clouded mentation whether or not the patient was actually following her medication compliance. We did go over the medications with both the patient and the  to try to assure as best we could that the patient was taking her medications correctly. Reconciliation of medicines with discharge from acute rehab was performed. The patient currently seems to be breathing okay. She does usually struggle with this warmer weather.   She does use oxygen if her saturations drop area.  This occurred about a week ago and she did not seek medical care outside of this visit. Patient has been followed up with cardiology and pulmonology and I did review their notes. She has continued to stop smoking. She has not been compliant with blood sugar monitoring nor consistent insulin therapy. Her A1c today is 9.3. We discussed once again adjustment of her long-acting insulin and continued sliding scale of lispro. The patient denies any headache or vision changes. Her dysarthria from her previous stroke seems to be improved. The patient states that she is no longer going to physical therapy or occupational therapy as she felt that what they were doing was really not as much as she was able to do at home. She does have an appointment with neurology in the near future. At this point in time because of her multiple medical problems and recent stroke and cardiac arrest we have decided that it is no longer possible for her to return to work. The risk of work would be exposure to other ill people which would then put her at risk for exacerbations of her underlying lung problems. Patient is not having active coronary symptoms. She denies orthopnea or PND. About 3 weeks ago she did develop a cough with congestion and required oxygen to maintain oxygen saturations but now this is resolved. Patient denies any GI complaints there is been no  complaints.   Patient Active Problem List   Diagnosis    Disorder of intervertebral disc of lumbar spine    Greater trochanteric bursitis of both hips    Pain of both hip joints    Lymphedema    Hyperlipidemia    Tobacco abuse    Acute exacerbation of chronic obstructive pulmonary disease (COPD) ([address]    CAD (coronary artery disease)    Type 2 diabetes mellitus with hyperglycemia, with long-term current use of insulin (HCC)    Vaginal candidiasis    Centrilobular emphysema ([address]    Obstructive sleep apnea syndrome    Vitamin D deficiency    Pneumonia due to infectious organism    Parotid mass    Severe persistent asthma    Chronic diastolic congestive heart failure (HCC)    Noncompliance    Hx of non-ST elevation myocardial infarction (NSTEMI)    Jerking    Hx of cardiac arrest    Reactive depression                 Current Outpatient Medications:     insulin glargine (LANTUS SOLOSTAR) 100 UNIT/ML injection pen, Inject 25 Units into the skin nightly Inject 20 Units into the skin nightly, Disp: 5 Adjustable Dose Pre-filled Pen Syringe, Rfl: 5    traMADol (ULTRAM) 50 MG tablet, Take 1 tablet by mouth every 4 hours as needed for Pain for up to 5 days. Intended supply: 5 days.  Take lowest dose possible to manage pain, Disp: 30 tablet, Rfl: 0    montelukast (SINGULAIR) 10 MG tablet, Take 1 tablet by mouth nightly, Disp: 28 tablet, Rfl: 5    sacubitril-valsartan (ENTRESTO) 24-26 MG per tablet, Take 1 tablet by mouth 2 times daily, Disp: 56 tablet, Rfl: 5    empagliflozin (JARDIANCE) 10 MG tablet, Take 1 tablet by mouth daily, Disp: 28 tablet, Rfl: 5    clopidogrel (PLAVIX) 75 MG tablet, TAKE 1 TABLET DAILY, Disp: 28 tablet, Rfl: 5    carvedilol (COREG) 6.25 MG tablet, Take 1 tablet by mouth 2 times daily (with meals), Disp: 56 tablet, Rfl: 5    sertraline (ZOLOFT) 100 MG tablet, Take 1 tablet by mouth daily, Disp: 28 tablet, Rfl: 5    isosorbide dinitrate (ISORDIL) 30 MG tablet, Take 1 tablet by mouth daily, Disp: 28 tablet, Rfl: 5    aspirin 81 MG EC tablet, Take 1 tablet by mouth daily, Disp: 28 tablet, Rfl: 5    insulin lispro, 1 Unit Dial, (HUMALOG KWIKPEN) 100 UNIT/ML SOPN, Follow pre meal sliding scaleFollow pre meal sliding scale, Disp: 4 Adjustable Dose Pre-filled Pen Syringe, Rfl: 5    Fluticasone furoate-vilanterol (BREO ELLIPTA) 200-25 MCG/INH AEPB inhaler, USE 1 INHALATION ORALLY    DAILY, Disp: 3 each, Rfl: 3    albuterol sulfate HFA (PROVENTIL;VENTOLIN;PROAIR) 108 (90 Base) MCG/ACT inhaler, Inhale 2 puffs into the lungs every 6 hours as needed for Wheezing, Disp: 1 each, Rfl: 5    Insulin Pen Needle 32G X 5 MM MISC, 1 each by Does not apply route daily, Disp: 100 each, Rfl: 3    vitamin D (ERGOCALCIFEROL) 1.25 MG (42659 UT) CAPS capsule, Take 1 capsule by mouth once a week, Disp: 4 capsule, Rfl: 0    senna (SENOKOT) 8.6 MG tablet, Take 2 tablets by mouth daily, Disp: , Rfl:     JARDIANCE 25 MG tablet, TAKE 1 TABLET BY MOUTH ONCE DAILY, Disp: , Rfl:     atorvastatin (LIPITOR) 40 MG tablet, TAKE 1 TABLET BY MOUTH ONCE DAILY, Disp: , Rfl:     acetaminophen (TYLENOL) 500 MG tablet, Take 1,000 mg by mouth every 6 hours as needed for Pain, Disp: , Rfl:           A comprehensive review of systems was negative except for what was noted in the HPI. Objective:    /76   Pulse 89   Temp 98 °F (36.7 °C)   Wt 258 lb (117 kg)   SpO2 95%   BMI 39.23 kg/m²     Physical examination:  Tympanic membranes are clear bilaterally. No anterior or posterior cervical adenopathy. Lungs diminished breath sounds but without wheeze, rhonchi, rales or crackles. Heart is regular but distant. No carotid bruits are detected. Abdomen is obese positive bowel sounds soft nontender. 2+ edema below the mid tibia bilaterally. No pain with side-to-side movement of the pelvis. Minimal discomfort over the sacrum but exquisite discomfort with palpation of the coccyx. No evidence hematoma or fluctuance. Balta Fuentes was seen today for cholesterol problem, diabetes and fall. Diagnoses and all orders for this visit:    Coccydynia  -     XR SACRUM COCCYX (MIN 2 VIEWS); Future  -     traMADol (ULTRAM) 50 MG tablet; Take 1 tablet by mouth every 4 hours as needed for Pain for up to 5 days. Intended supply: 5 days. Take lowest dose possible to manage pain    Pelvic pain  -     XR HIP BILATERAL W AP PELVIS (2 VIEWS); Future  -     traMADol (ULTRAM) 50 MG tablet; Take 1 tablet by mouth every 4 hours as needed for Pain for up to 5 days. Intended supply: 5 days.  Take lowest dose possible to manage pain    Coronary artery disease involving native coronary artery of native heart without angina pectoris  -     Lipid Panel; Future  -     Comprehensive Metabolic Panel; Future  -     POCT glycosylated hemoglobin (Hb A1C)    Centrilobular emphysema (HCC)  -     Lipid Panel; Future  -     Comprehensive Metabolic Panel; Future  -     POCT glycosylated hemoglobin (Hb A1C)    Obstructive sleep apnea syndrome    Type 2 diabetes mellitus with hyperglycemia, with long-term current use of insulin (HCC)  -     Lipid Panel; Future  -     Comprehensive Metabolic Panel; Future  -     POCT glycosylated hemoglobin (Hb A1C)    Mixed hyperlipidemia  -     Lipid Panel; Future  -     Comprehensive Metabolic Panel; Future  -     POCT glycosylated hemoglobin (Hb A1C)    Other orders  -     insulin glargine (LANTUS SOLOSTAR) 100 UNIT/ML injection pen; Inject 25 Units into the skin nightly Inject 20 Units into the skin nightly  Patient is to have x-rays of her pelvis sacrum and coccyx. These will be reviewed. Hemoglobin A1c was 9.3 and instructions regarding insulin changes are given. Paperwork regarding her disability will be completed. She will no longer be attempting to go back to work. Pain medication for coccydynia is given. Patient is to be seen back in 4 months to reassess. She does have upcoming appointments also with pulmonology and cardiology. Total time of review of records, counseling and exam were greater than 40 minutes.

## 2022-12-05 ENCOUNTER — CARE COORDINATION (OUTPATIENT)
Dept: CARE COORDINATION | Age: 57
End: 2022-12-05

## 2022-12-05 DIAGNOSIS — E11.59 TYPE 2 DIABETES MELLITUS WITH OTHER CIRCULATORY COMPLICATION, WITHOUT LONG-TERM CURRENT USE OF INSULIN (HCC): Primary | ICD-10-CM

## 2022-12-05 RX ORDER — BLOOD-GLUCOSE METER
1 KIT MISCELLANEOUS DAILY
Qty: 1 KIT | Refills: 0 | Status: SHIPPED | OUTPATIENT
Start: 2022-12-05
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
## 2022-12-06 ENCOUNTER — CARE COORDINATION (OUTPATIENT)
Dept: CARE COORDINATION | Age: 57
End: 2022-12-06

## 2022-12-06 NOTE — CARE COORDINATION
EDD contacted Jl Camara to inquire what pharmacy she prefers for glucometer. Left message requesting a call back. EDD received a call back from Elzbieta's number but the call was disconnected.

## 2022-12-07 ENCOUNTER — CARE COORDINATION (OUTPATIENT)
Dept: CARE COORDINATION | Age: 57
End: 2022-12-07

## 2022-12-08 ENCOUNTER — TELEPHONE (OUTPATIENT)
Dept: CARE COORDINATION | Age: 57
End: 2022-12-08

## 2022-12-08 NOTE — TELEPHONE ENCOUNTER
----- Message from Marifer Khan RN sent at 12/5/2022  3:22 PM EST -----  Regarding: assistance  Could you assist with this patient? 1. Check cost of RPM                                                           2.  Ask Dr. Nae Espianl for a script for a new nebulizer  Please let me know if you have any questions.

## 2022-12-08 NOTE — PROGRESS NOTES
20 Units into the skin nightly  Patient taking differently: Inject 25 Units into the skin nightly 11/29/22  Yes Rachael Freeman MD   montelukast (SINGULAIR) 10 MG tablet Take 1 tablet by mouth nightly 9/17/22  Yes Rachael Freeman MD   sacubitril-valsartan (ENTRESTO) 24-26 MG per tablet Take 1 tablet by mouth 2 times daily 9/17/22  Yes Rachael Freeman MD   empagliflozin (JARDIANCE) 10 MG tablet Take 1 tablet by mouth daily 9/17/22  Yes Rachael Freeman MD   clopidogrel (PLAVIX) 75 MG tablet TAKE 1 TABLET DAILY 9/17/22  Yes Rachael Freeman MD   carvedilol (COREG) 6.25 MG tablet Take 1 tablet by mouth 2 times daily (with meals) 9/17/22  Yes Rachael Freeman MD   sertraline (ZOLOFT) 100 MG tablet Take 1 tablet by mouth daily  Patient taking differently: Take 150 mg by mouth daily 9/17/22  Yes Rachael Freeman MD   isosorbide dinitrate (ISORDIL) 30 MG tablet Take 1 tablet by mouth daily 9/17/22  Yes Rachael Freeman MD   aspirin 81 MG EC tablet Take 1 tablet by mouth daily 9/17/22  Yes Rachael Freeman MD   insulin lispro, 1 Unit Dial, (HUMALOG KWIKPEN) 100 UNIT/ML SOPN Follow pre meal sliding scaleFollow pre meal sliding scale 9/17/22  Yes Rachael Freeman MD   Fluticasone furoate-vilanterol (BREO ELLIPTA) 200-25 MCG/INH AEPB inhaler USE 1 INHALATION ORALLY    DAILY 9/17/22  Yes Rachael Freeman MD   albuterol sulfate HFA (PROVENTIL;VENTOLIN;PROAIR) 108 (90 Base) MCG/ACT inhaler Inhale 2 puffs into the lungs every 6 hours as needed for Wheezing 9/17/22  Yes Rachael Freeman MD   Insulin Pen Needle 32G X 5 MM MISC 1 each by Does not apply route daily 9/17/22  Yes Rachael Freeman MD   vitamin D (ERGOCALCIFEROL) 1.25 MG (80087 UT) CAPS capsule Take 1 capsule by mouth once a week 9/2/22  Yes Rachael Freeman MD   senna (SENOKOT) 8.6 MG tablet Take 2 tablets by mouth daily   Yes Historical Provider, MD   JARDIANCE 25 MG tablet TAKE 1 TABLET BY MOUTH ONCE DAILY 7/6/22  Yes Historical Provider, MD   acetaminophen (TYLENOL) 500 MG tablet Take 1,000 mg by mouth every 6 hours as needed for Pain   Yes Historical Provider, MD   atorvastatin (LIPITOR) 40 MG tablet TAKE 1 TABLET BY MOUTH ONCE DAILY 22   Historical Provider, MD     Social History:       She is a former smoker of half a pack a day for 40 years and quit in 2022; no alcohol or illicit drugs     Review of Systems:     No chest pain or palpitations  No SOB  No vertigo, lightheadedness or loss of consciousness  No falls, tripping or stumbling  No incontinence of bowels or bladder  No itching or bruising appreciated  +BLE weakness and numbness  + muscle twitching  No speech or swallowing problems    ROS is otherwise negative    Family History:     Family History   Problem Relation Age of Onset    Coronary Art Dis Mother     Diabetes Mother     Colon Cancer Mother     Dementia Mother     Lymphoma Mother         non Hodgkins    Kidney Cancer Sister          in 45s      History of Present Illness: The patient was referred by Dr. Hernan Glass for jerking and history of cardiac arrest    She presents with her aunt and is a good historian with an additional significant past medical history of uncontrolled diabetes, CAD with stents, CABG, cardiac arrest, lumbar disc disease, parotid tumor s/p thyroidectomy, LORA not on CPAP, HTN, HLD, COPD, and obesity. In  of this year she presented to [facility] with shortness of breath followed by cardiac arrest with respiratory failure and ended up at [facility]. She had cardiac cath with balloon angioplasty and stenting on . She was felt to suffer from a degree of hypoxic/anoxic encephalopathy and had an EEG as an inpatient that was nondiagnostic. She then recovered, went to rehab, and is back home but started noticing daily intermittent sharp, jerking, twitching movements in various muscles including her neck, limbs, and torso. She is fully awake for these movements and they can occur frequently at any time throughout the day.   No tongue biting or urinary incontinence. Additionally she has noticed some slower speech, memory decline, and decreased endurance. She was seen at [facility] a few days ago due to the severity of these jerking movements and was seen by neurology and started on baclofen which has helped reduce the severity. She does not remember having an MRI of the brain or any further testing. Her diabetes remains poorly controlled with last A1c greater than 9. She is not compliant with her CPAP due to claustrophobia. She has no previous history of strokes, seizures, head injuries, or neurologic problems. No family history of neurologic disease. Not driving.     Objective:     /79 (Site: Right Lower Arm, Position: Sitting)   Pulse 94   Temp 97.7 °F (36.5 °C)   Wt 258 lb (117 kg)   SpO2 95%   BMI 39.23 kg/m²     General appearance: alert, appears stated age, cooperative and in no distress  Head: normocephalic, without obvious abnormality, atraumatic  Eyes: conjunctivae/corneas clear; no drainage  Neck: Full range of motion without cervicalgia  Back: diminished throughout  Heart: regular rate and rhythm---no murmur  Abdomen: soft, non-tender; bowel sounds normal; no masses,  no organomegaly  Extremities: 1+ edema BLE  Pulses: 2+ and symmetric radials  Skin: Stasis dermatitis and vascular discoloration bilateral lower extremities      Mental Status: alert and oriented x 4    Appropriate attention/concentration  Intact fundus of knowledge  Memories intact    Speech: no dysarthria  Language: no aphasias    Cranial Nerves:  I: smell    II: visual acuity     II: visual fields Full    II: pupils MINERVA   III,VII: ptosis None   III,IV,VI: extraocular muscles  EOMI without nystagmus   V: mastication Normal   V: facial light touch sensation  Normal   V,VII: corneal reflex     VII: facial muscle function - upper  Normal   VII: facial muscle function - lower Normal   VIII: hearing Normal   IX: soft palate elevation  Normal   IX,X: gag reflex    XI: trapezius strength  5/5   XI: sternocleidomastoid strength 5/5   XI: neck extension strength  5/5   XII: tongue strength  Normal     Motor:  5/5 throughout  Obese bulk and normal tone  No drift   Intermittent focal twitch head and neck as well as negative myoclonus of both arms left greater than right with eyes closed and hands outstretched; no seizure-like activity    Sensory:  LT decreased to below knees bilaterally    Coordination:   FFM and FN mildly dysmetric on the L    Gait:  [place]'s  Antalgic and slow bilaterally    DTR:   Right Brachioradialis reflex 1+  Left Brachioradialis reflex 1+  Right Biceps reflex 1+  Left Biceps reflex 1+  Right Quadriceps reflex  BE  Left Quadriceps reflex  BE  Right Achilles reflex 0  Left Achilles reflex 0    No Alves's    No other pathological reflexes    Laboratory/Radiology:     CBC with Differential:    Lab Results   Component Value Date/Time    WBC 12.0 09/29/2022 04:20 PM    RBC 5.08 09/29/2022 04:20 PM    HGB 15.0 09/29/2022 04:20 PM    HCT 45.6 09/29/2022 04:20 PM     09/29/2022 04:20 PM    MCV 89.8 09/29/2022 04:20 PM    MCH 29.5 09/29/2022 04:20 PM    MCHC 32.9 09/29/2022 04:20 PM    RDW 13.3 09/29/2022 04:20 PM    NRBC 0.0 08/15/2022 05:31 PM    LYMPHOPCT 29.9 09/29/2022 04:20 PM    LYMPHOPCT 27.9 08/15/2022 05:31 PM    MONOPCT 6.2 09/29/2022 04:20 PM    BASOPCT 0.7 09/29/2022 04:20 PM    MONOSABS 0.75 09/29/2022 04:20 PM    LYMPHSABS 3.59 09/29/2022 04:20 PM    EOSABS 0.29 09/29/2022 04:20 PM    BASOSABS 0.08 09/29/2022 04:20 PM     CMP:    Lab Results   Component Value Date/Time     09/29/2022 04:20 PM    K 5.0 09/29/2022 04:20 PM     09/29/2022 04:20 PM    CO2 26 09/29/2022 04:20 PM    BUN 17 09/29/2022 04:20 PM    CREATININE 0.7 09/29/2022 04:20 PM    GFRAA >60 09/29/2022 04:20 PM    LABGLOM >60 09/29/2022 04:20 PM    LABGLOM >60 08/15/2022 05:31 PM    GLUCOSE 151 09/29/2022 04:20 PM    GLUCOSE 279 08/15/2022 05:31 PM    PROT 7.6 09/29/2022 04:20 PM    LABALBU 4.1 09/29/2022 04:20 PM    LABALBU 3.4 08/15/2022 05:31 PM    CALCIUM 10.3 09/29/2022 04:20 PM    BILITOT 0.2 09/29/2022 04:20 PM    BILITOT Negative 06/04/2022 05:37 PM    ALKPHOS 144 09/29/2022 04:20 PM    AST 20 09/29/2022 04:20 PM    ALT 18 09/29/2022 04:20 PM     HgBA1c:    Lab Results   Component Value Date/Time    LABA1C 9.3 11/29/2022 09:04 AM     FLP:    Lab Results   Component Value Date/Time    TRIG 334 09/29/2022 04:20 PM    HDL 34 09/29/2022 04:20 PM    LDLCALC 251 09/29/2022 04:20 PM    LABVLDL 67 09/29/2022 04:20 PM     CT head from [facility] June 2022 report only: No acute intracranial hemorrhage or acute intracranial process     Routine EEG [facility]:Test Interpretation   This prolong 21 channel EEG is a technically limited study. It is nondiagnostic for assessment of cerebral activity in the patient following the cardiac arrest   Follow-up study is recommended within a short interval upon the discontinuation of centrally  active medications and if necessary administration of muscle paralyzing agent for better assessment of cerebral activity   Clinical correlation is needed     All pertinent labs and images were personally reviewed at the time of this visit    Assessment:     Myoclonic jerks: Low suspicion that these are seizures at this time and suspect that these are related to her recent hypoxic/anoxic injury. Movements have improved on baclofen which can be uptitrated cautiously in light of her underlying respiratory disease. It would not be unreasonable to obtain MRI of the brain and routine EEG as well due to her recent anoxic injury. Her underlying uncontrolled diabetes and sleep apnea may exacerbate these movements.     Plan:     -MRI brain WO  -Routine EEG  -Increase baclofen to 5 mg TID  -Control diabetes and LORA    RTO in 4 mos or sooner SANDRA Proctor - CNP  10:24 AM  12/9/2022    I spent 45 minutes with this patient  an her aunt obtaining the HPI and discussing the exam with greater than 50% of the time providing counseling and education on medications and other treatment plans. All questions were answered prior to leaving my office.

## 2022-12-08 NOTE — TELEPHONE ENCOUNTER
I called pt insurance. She does have a $0 copay, however, the medical claim could be denied if it is not deemed medically necessary.      Order for patients nebulizer was sent to the medical supply store she had used with them in the past.

## 2022-12-09 ENCOUNTER — OFFICE VISIT (OUTPATIENT)
Dept: NEUROLOGY | Age: 57
End: 2022-12-09
Payer: COMMERCIAL

## 2022-12-09 ENCOUNTER — TELEPHONE (OUTPATIENT)
Dept: NEUROLOGY | Age: 57
End: 2022-12-09

## 2022-12-09 ENCOUNTER — CARE COORDINATION (OUTPATIENT)
Dept: CARE COORDINATION | Age: 57
End: 2022-12-09

## 2022-12-09 VITALS
TEMPERATURE: 97.7 F | SYSTOLIC BLOOD PRESSURE: 133 MMHG | DIASTOLIC BLOOD PRESSURE: 79 MMHG | HEART RATE: 94 BPM | WEIGHT: 258 LBS | OXYGEN SATURATION: 95 % | BODY MASS INDEX: 39.23 KG/M2

## 2022-12-09 DIAGNOSIS — G25.3 MYOCLONIC JERKING: Primary | ICD-10-CM

## 2022-12-09 DIAGNOSIS — G93.1 HYPOXIC BRAIN INJURY (HCC): ICD-10-CM

## 2022-12-09 PROBLEM — B37.31 VAGINAL CANDIDIASIS: Status: RESOLVED | Noted: 2020-06-02 | Resolved: 2022-12-09

## 2022-12-09 PROBLEM — K11.8 PAROTID MASS: Status: RESOLVED | Noted: 2018-10-05 | Resolved: 2022-12-09

## 2022-12-09 PROBLEM — M70.61 GREATER TROCHANTERIC BURSITIS OF BOTH HIPS: Status: RESOLVED | Noted: 2019-10-09 | Resolved: 2022-12-09

## 2022-12-09 PROBLEM — M25.551 PAIN OF BOTH HIP JOINTS: Status: RESOLVED | Noted: 2019-10-09 | Resolved: 2022-12-09

## 2022-12-09 PROBLEM — Z91.199 NONCOMPLIANCE: Status: RESOLVED | Noted: 2022-05-03 | Resolved: 2022-12-09

## 2022-12-09 PROBLEM — J18.9 PNEUMONIA DUE TO INFECTIOUS ORGANISM: Status: RESOLVED | Noted: 2021-07-13 | Resolved: 2022-12-09

## 2022-12-09 PROBLEM — R25.3 JERKING: Status: RESOLVED | Noted: 2022-09-02 | Resolved: 2022-12-09

## 2022-12-09 PROBLEM — M25.552 PAIN OF BOTH HIP JOINTS: Status: RESOLVED | Noted: 2019-10-09 | Resolved: 2022-12-09

## 2022-12-09 PROBLEM — J43.2 CENTRILOBULAR EMPHYSEMA (HCC): Status: RESOLVED | Noted: 2020-07-01 | Resolved: 2022-12-09

## 2022-12-09 PROBLEM — Z86.74 HX OF CARDIAC ARREST: Status: RESOLVED | Noted: 2022-09-02 | Resolved: 2022-12-09

## 2022-12-09 PROBLEM — I89.0 LYMPHEDEMA: Status: RESOLVED | Noted: 2018-10-01 | Resolved: 2022-12-09

## 2022-12-09 PROBLEM — M70.62 GREATER TROCHANTERIC BURSITIS OF BOTH HIPS: Status: RESOLVED | Noted: 2019-10-09 | Resolved: 2022-12-09

## 2022-12-09 PROCEDURE — 99204 OFFICE O/P NEW MOD 45 MIN: CPT | Performed by: NURSE PRACTITIONER

## 2022-12-09 RX ORDER — BACLOFEN 10 MG/1
TABLET ORAL
COMMUNITY
Start: 2022-12-07 | End: 2022-12-09 | Stop reason: SDUPTHER

## 2022-12-09 RX ORDER — BACLOFEN 5 MG/1
5 TABLET ORAL 3 TIMES DAILY PRN
Qty: 270 TABLET | Refills: 3 | Status: SHIPPED | OUTPATIENT
Start: 2022-12-09

## 2022-12-09 RX ORDER — ALPRAZOLAM 0.25 MG/1
0.25 TABLET ORAL NIGHTLY PRN
COMMUNITY

## 2022-12-09 RX ORDER — TRAMADOL HYDROCHLORIDE 50 MG/1
50 TABLET ORAL EVERY 6 HOURS PRN
COMMUNITY

## 2022-12-09 NOTE — TELEPHONE ENCOUNTER
Patient was seen in our office today by Salazar FLORES. MRI and EEG was ordered. Patient is to follow up in 4 months with Deion Shelley.

## 2022-12-09 NOTE — CARE COORDINATION
Remote Patient Kit Ordering Note      Date/Time:  12/9/2022 3:14 PM      [x] CCSS confirmed patient shipping address  [x] Patient will receive package over the next 2-4 business days. Someone 21 years or older must be present to sign for UPS delivery. [x] Patient to contact virtual installation-specific phone number listed in the patient instructions. [x] If the patient does not contact HRS within 24 hours, an [employer] Ambassador Lonny Zepeda will call the patient directly: If the patient does not answer, HRS will follow up with the clinical team notifying them about the unsuccessful attempt to contact the patient. HRS will make three call attempts to the patient. [x] ACM will contact patient once equipment is active to welcome them to the program.                                                         [x] Hours of RPM monitoring - Monday-Friday [id number]                     All questions answered at this time. ACM made aware the RPM kit has been ordered. CCSS notified patient of RPM equipment order. Patient did state that where she lives she can not have packages delivered. She asks for package to be delivered to her daughters house.  She states package has to be delivered after 4 pm. Per conversation with Willy Nicole I am going to write in the comments to try and deliver after 4 pm.

## 2022-12-11 DIAGNOSIS — E11.65 TYPE 2 DIABETES MELLITUS WITH HYPERGLYCEMIA, WITH LONG-TERM CURRENT USE OF INSULIN (HCC): ICD-10-CM

## 2022-12-11 DIAGNOSIS — I50.32 CHRONIC DIASTOLIC CONGESTIVE HEART FAILURE (HCC): Primary | ICD-10-CM

## 2022-12-11 DIAGNOSIS — J44.9 CHRONIC OBSTRUCTIVE PULMONARY DISEASE, UNSPECIFIED COPD TYPE (HCC): ICD-10-CM

## 2022-12-11 DIAGNOSIS — I10 HYPERTENSION, UNSPECIFIED TYPE: ICD-10-CM

## 2022-12-11 DIAGNOSIS — Z79.4 TYPE 2 DIABETES MELLITUS WITH HYPERGLYCEMIA, WITH LONG-TERM CURRENT USE OF INSULIN (HCC): ICD-10-CM

## 2022-12-11 NOTE — PROGRESS NOTES
12/11/22 9:41 AM       Remote Patient Monitoring Treatment Plan    Received request from ACLOUIS/LIVIER Huston RN to order remote patient monitoring for in home monitoring of CHF, COPD, Diabetes, and HTN and order completed. Patient will be monitoring blood pressure   glucose  pulse ox   weight  survey questions daily. Patient will engage in Remote Patient Monitoring each day to develop the skills necessary for self management. RPM Care Team Responsibilities:   Alerts will be reviewed daily and addressed within 2-4 hours during operational hours (Monday -Friday 9 am-4 pm)  Alert response and intervention documented in patient medical record  Alert response escalated to PCP per protocol and documented in patient medical record  Patient monitored over approximately  days  Discharge from program based on self-management readiness    See care coordination encounters for additional details.       Rosemary Ortiz DNP, FNP-C, Remote Patient Monitoring NP, () [phone]

## 2022-12-20 ENCOUNTER — COMMUNITY OUTREACH (OUTPATIENT)
Dept: PRIMARY CARE CLINIC | Age: 57
End: 2022-12-20

## 2022-12-28 ENCOUNTER — CARE COORDINATION (OUTPATIENT)
Dept: CARE COORDINATION | Age: 57
End: 2022-12-28

## 2022-12-28 DIAGNOSIS — G25.3 MYOCLONIC JERKING: ICD-10-CM

## 2022-12-28 DIAGNOSIS — G93.1 HYPOXIC BRAIN INJURY (HCC): ICD-10-CM

## 2023-01-10 ENCOUNTER — CARE COORDINATION (OUTPATIENT)
Dept: CARE COORDINATION | Age: 58
End: 2023-01-10

## 2023-01-19 ENCOUNTER — CARE COORDINATION (OUTPATIENT)
Dept: CARE COORDINATION | Age: 58
End: 2023-01-19

## 2023-01-24 ENCOUNTER — CARE COORDINATION (OUTPATIENT)
Dept: CARE COORDINATION | Age: 58
End: 2023-01-24

## 2023-01-24 NOTE — CARE COORDINATION
Attempted to reach patient by telephone. Left HIPAA compliant message requesting a return call. Will attempt to reach patient again. Patient has received RPM equipment but has not activated.

## 2023-01-31 ENCOUNTER — CARE COORDINATION (OUTPATIENT)
Dept: CARE COORDINATION | Age: 58
End: 2023-01-31

## 2023-02-01 ENCOUNTER — CARE COORDINATION (OUTPATIENT)
Dept: CARE COORDINATION | Age: 58
End: 2023-02-01

## 2023-02-01 NOTE — CARE COORDINATION
EDD contacted [facility]. EDD inquired if she has received the remote patient monitoring kit. She states she does not know. She offers to look for kit and call EDD back if she finds it or not. EDD did not receive call back from [facility]. Will attempt to call again.

## 2023-02-07 ENCOUNTER — CARE COORDINATION (OUTPATIENT)
Dept: CARE COORDINATION | Age: 58
End: 2023-02-07

## 2023-02-08 ENCOUNTER — CARE COORDINATION (OUTPATIENT)
Dept: CARE COORDINATION | Age: 58
End: 2023-02-08

## 2023-02-14 ENCOUNTER — CARE COORDINATION (OUTPATIENT)
Dept: CARE COORDINATION | Age: 58
End: 2023-02-14

## 2023-02-22 ENCOUNTER — CARE COORDINATION (OUTPATIENT)
Dept: CARE COORDINATION | Age: 58
End: 2023-02-22

## 2023-02-22 NOTE — CARE COORDINATION
Attempted to reach patient by telephone. Left HIPAA compliant message requesting a return call. Multiple attempts to reach patient have been unsuccessful. Will disenroll from care coordination and send letter to patient.

## 2023-02-22 NOTE — LETTER
2/22/2023    [address] [place] [address] [address].     I have enjoyed working with you to improve your health. I would like to continue to provide you support. However, I have been unable to reach you at, [phone] (home) . Please let me know if I can help schedule an office visit for you or answer any questions. Delvis Mills MD is interested in your health and hopes to hear from you soon. If you would like continued access to your Nurse Care Coordinator, Jane Gutierrez RN, you can reach me at [phone]. I will wait to hear from you and will no longer reach out to you. Delvis Mills MD and his/her team will continue to provide care and be available for questions.       In good health,     Jane Gutierrez RN

## 2023-03-03 ENCOUNTER — CARE COORDINATION (OUTPATIENT)
Dept: CARE COORDINATION | Age: 58
End: 2023-03-03

## 2023-03-03 DIAGNOSIS — I50.32 CHRONIC DIASTOLIC CONGESTIVE HEART FAILURE (HCC): Primary | ICD-10-CM

## 2023-03-03 DIAGNOSIS — E11.65 TYPE 2 DIABETES MELLITUS WITH HYPERGLYCEMIA, WITH LONG-TERM CURRENT USE OF INSULIN (HCC): ICD-10-CM

## 2023-03-03 DIAGNOSIS — J43.2 CENTRILOBULAR EMPHYSEMA (HCC): ICD-10-CM

## 2023-03-03 DIAGNOSIS — I10 HYPERTENSION, UNSPECIFIED TYPE: ICD-10-CM

## 2023-03-03 DIAGNOSIS — Z79.4 TYPE 2 DIABETES MELLITUS WITH HYPERGLYCEMIA, WITH LONG-TERM CURRENT USE OF INSULIN (HCC): ICD-10-CM

## 2023-03-03 NOTE — PROGRESS NOTES
3/3/23 4:48 PM     Remote Patient Order Discontinued    Received request from Ciro Carey RN  to discontinue order for remote patient monitoring of CHF, COPD, Diabetes, and HTN and order completed.      Cr Smith DNP, FNP-C, Remote Patient Monitoring NP, (ph) [phone]

## 2023-03-03 NOTE — CARE COORDINATION
Attempted to reach patient by telephone regarding RPM equipment. Left HIPAA compliant message requesting a return call. Will attempt to reach patient again. [facility] contacted Daid (spouse) and requested to have Martita Dalton send back RPM equipment. [facility] gave [facility]'s contact information to  for any questions.

## 2023-03-03 NOTE — CARE COORDINATION
CCSS placed call to patient to arrange RPM kit  through [facility]. Reviewed with patient how to pack equipment in original packing. Verified patient's availability to schedule UPS  time. UPS  time requested. Anticipated  date range 2 to 4 business days.

## 2023-03-06 ENCOUNTER — TELEPHONE (OUTPATIENT)
Dept: FAMILY MEDICINE CLINIC | Age: 58
End: 2023-03-06

## 2023-03-06 NOTE — TELEPHONE ENCOUNTER
Nannette Jha called from billing with [facility]. She said the patient had infusion of fasenra and the insurance denied it because its not medically necessary. The insurance wants to know if she has a certain type of asthma or if she's had a breathing study done where she was unable to blow out less than 80% of normal. They also need to know if she's tried more standard treatments and if she's had two asthma attacks within the last year that she needed treatment. They need to know if she has elevated levels of white blood cells. They need to know within the last year.       They need clinicals that shows this sent to Nannette Jha so she can include it in with an appeal.      Fax# [phone]

## 2023-04-05 ENCOUNTER — OFFICE VISIT (OUTPATIENT)
Dept: FAMILY MEDICINE CLINIC | Age: 58
End: 2023-04-05
Payer: COMMERCIAL

## 2023-04-05 VITALS
DIASTOLIC BLOOD PRESSURE: 60 MMHG | SYSTOLIC BLOOD PRESSURE: 100 MMHG | WEIGHT: 260 LBS | HEART RATE: 78 BPM | BODY MASS INDEX: 39.53 KG/M2 | TEMPERATURE: 97 F | OXYGEN SATURATION: 96 %

## 2023-04-05 DIAGNOSIS — E78.2 MIXED HYPERLIPIDEMIA: ICD-10-CM

## 2023-04-05 DIAGNOSIS — I25.10 CORONARY ARTERY DISEASE INVOLVING NATIVE CORONARY ARTERY OF NATIVE HEART WITHOUT ANGINA PECTORIS: ICD-10-CM

## 2023-04-05 DIAGNOSIS — G47.33 OBSTRUCTIVE SLEEP APNEA SYNDROME: ICD-10-CM

## 2023-04-05 DIAGNOSIS — I50.32 CHRONIC DIASTOLIC CONGESTIVE HEART FAILURE (HCC): ICD-10-CM

## 2023-04-05 DIAGNOSIS — J45.50 SEVERE PERSISTENT ASTHMA, UNSPECIFIED WHETHER COMPLICATED: ICD-10-CM

## 2023-04-05 DIAGNOSIS — E55.9 VITAMIN D DEFICIENCY: ICD-10-CM

## 2023-04-05 DIAGNOSIS — Z79.4 TYPE 2 DIABETES MELLITUS WITH HYPERGLYCEMIA, WITH LONG-TERM CURRENT USE OF INSULIN (HCC): ICD-10-CM

## 2023-04-05 DIAGNOSIS — E11.65 TYPE 2 DIABETES MELLITUS WITH HYPERGLYCEMIA, WITH LONG-TERM CURRENT USE OF INSULIN (HCC): Primary | ICD-10-CM

## 2023-04-05 DIAGNOSIS — F32.9 REACTIVE DEPRESSION: ICD-10-CM

## 2023-04-05 DIAGNOSIS — Z79.4 TYPE 2 DIABETES MELLITUS WITH HYPERGLYCEMIA, WITH LONG-TERM CURRENT USE OF INSULIN (HCC): Primary | ICD-10-CM

## 2023-04-05 DIAGNOSIS — G93.1 HYPOXIC BRAIN INJURY (HCC): ICD-10-CM

## 2023-04-05 DIAGNOSIS — E11.65 TYPE 2 DIABETES MELLITUS WITH HYPERGLYCEMIA, WITH LONG-TERM CURRENT USE OF INSULIN (HCC): ICD-10-CM

## 2023-04-05 LAB
ALBUMIN SERPL-MCNC: 3.9 G/DL (ref 3.5–5.2)
ALP SERPL-CCNC: 132 U/L (ref 35–104)
ALT SERPL-CCNC: 25 U/L (ref 0–32)
ANION GAP SERPL CALCULATED.3IONS-SCNC: 14 MMOL/L (ref 7–16)
AST SERPL-CCNC: 31 U/L (ref 0–31)
BASOPHILS # BLD: 0.11 E9/L (ref 0–0.2)
BASOPHILS NFR BLD: 1.1 % (ref 0–2)
BILIRUB SERPL-MCNC: <0.2 MG/DL (ref 0–1.2)
BILIRUB UR QL STRIP: NEGATIVE
BNP BLD-MCNC: 126 PG/ML (ref 0–125)
BUN SERPL-MCNC: 25 MG/DL (ref 6–20)
CALCIUM SERPL-MCNC: 10.7 MG/DL (ref 8.6–10.2)
CHLORIDE SERPL-SCNC: 96 MMOL/L (ref 98–107)
CHOLESTEROL, TOTAL: 357 MG/DL (ref 0–199)
CLARITY UR: CLEAR
CO2 SERPL-SCNC: 26 MMOL/L (ref 22–29)
COLOR UR: YELLOW
CREAT SERPL-MCNC: 0.7 MG/DL (ref 0.5–1)
CREAT UR-MCNC: 57 MG/DL (ref 29–226)
EOSINOPHIL # BLD: 0.25 E9/L (ref 0.05–0.5)
EOSINOPHIL NFR BLD: 2.6 % (ref 0–6)
ERYTHROCYTE [DISTWIDTH] IN BLOOD BY AUTOMATED COUNT: 14.2 FL (ref 11.5–15)
GLUCOSE SERPL-MCNC: 240 MG/DL (ref 74–99)
GLUCOSE UR STRIP-MCNC: >=1000 MG/DL
HBA1C MFR BLD: 11.4 %
HCT VFR BLD AUTO: 50.6 % (ref 34–48)
HDLC SERPL-MCNC: 30 MG/DL
HGB BLD-MCNC: 16 G/DL (ref 11.5–15.5)
HGB UR QL STRIP: NEGATIVE
IMM GRANULOCYTES # BLD: 0.06 E9/L
IMM GRANULOCYTES NFR BLD: 0.6 % (ref 0–5)
KETONES UR STRIP-MCNC: NEGATIVE MG/DL
LDLC SERPL CALC-MCNC: ABNORMAL MG/DL (ref 0–99)
LEUKOCYTE ESTERASE UR QL STRIP: NEGATIVE
LYMPHOCYTES # BLD: 3.51 E9/L (ref 1.5–4)
LYMPHOCYTES NFR BLD: 36.7 % (ref 20–42)
MCH RBC QN AUTO: 29 PG (ref 26–35)
MCHC RBC AUTO-ENTMCNC: 31.6 % (ref 32–34.5)
MCV RBC AUTO: 91.7 FL (ref 80–99.9)
MICROALBUMIN UR-MCNC: <12 MG/L
MICROALBUMIN/CREAT UR-RTO: ABNORMAL (ref 0–30)
MONOCYTES # BLD: 0.7 E9/L (ref 0.1–0.95)
MONOCYTES NFR BLD: 7.3 % (ref 2–12)
NEUTROPHILS # BLD: 4.94 E9/L (ref 1.8–7.3)
NEUTS SEG NFR BLD: 51.7 % (ref 43–80)
NITRITE UR QL STRIP: NEGATIVE
PH UR STRIP: 5 [PH] (ref 5–9)
PLATELET # BLD AUTO: 366 E9/L (ref 130–450)
PMV BLD AUTO: 11 FL (ref 7–12)
POTASSIUM SERPL-SCNC: 5 MMOL/L (ref 3.5–5)
PROT SERPL-MCNC: 7.3 G/DL (ref 6.4–8.3)
PROT UR STRIP-MCNC: NEGATIVE MG/DL
RBC # BLD AUTO: 5.52 E12/L (ref 3.5–5.5)
SODIUM SERPL-SCNC: 136 MMOL/L (ref 132–146)
SP GR UR STRIP: 1.01 (ref 1–1.03)
TRIGL SERPL-MCNC: 427 MG/DL (ref 0–149)
UROBILINOGEN UR STRIP-ACNC: 0.2 E.U./DL
VITAMIN D 25-HYDROXY: 23 NG/ML (ref 30–100)
VLDLC SERPL CALC-MCNC: ABNORMAL MG/DL
WBC # BLD: 9.6 E9/L (ref 4.5–11.5)

## 2023-04-05 PROCEDURE — 83036 HEMOGLOBIN GLYCOSYLATED A1C: CPT | Performed by: INTERNAL MEDICINE

## 2023-04-05 PROCEDURE — 81003 URINALYSIS AUTO W/O SCOPE: CPT | Performed by: INTERNAL MEDICINE

## 2023-04-05 PROCEDURE — 3046F HEMOGLOBIN A1C LEVEL >9.0%: CPT | Performed by: INTERNAL MEDICINE

## 2023-04-05 PROCEDURE — 99214 OFFICE O/P EST MOD 30 MIN: CPT | Performed by: INTERNAL MEDICINE

## 2023-04-05 RX ORDER — BACLOFEN 5 MG/1
5 TABLET ORAL 3 TIMES DAILY PRN
Qty: 270 TABLET | Refills: 3 | Status: SHIPPED | OUTPATIENT
Start: 2023-04-05

## 2023-04-05 RX ORDER — INSULIN GLARGINE 100 [IU]/ML
25 INJECTION, SOLUTION SUBCUTANEOUS NIGHTLY
Qty: 1 ADJUSTABLE DOSE PRE-FILLED PEN SYRINGE | Refills: 5 | Status: SHIPPED | OUTPATIENT
Start: 2023-04-05

## 2023-04-05 NOTE — PROGRESS NOTES
3-month refill at that time. Patient is also having some visual difficulty. She states there is some blurriness of but not total loss of vision. She had noted some increased peripheral lymphedema. Her breathing actually has not been too bad although she does occasionally have cough. She does have Breo but she only takes it occasionally.   Patient Active Problem List   Diagnosis    Disorder of intervertebral disc of lumbar spine    Hyperlipidemia    Tobacco abuse    CAD (coronary artery disease)    Type 2 diabetes mellitus with hyperglycemia, with long-term current use of insulin ([facility])    Obstructive sleep apnea syndrome    Vitamin D deficiency    Severe persistent asthma    Chronic diastolic congestive heart failure ([facility])    Hx of non-ST elevation myocardial infarction (NSTEMI)    Reactive depression    Myoclonic jerking    Hypoxic brain injury ([facility] [address] [address]                 Current Outpatient Medications:     insulin glargine (LANTUS SOLOSTAR) 100 UNIT/ML injection pen, Inject 25 Units into the skin nightly Inject 20 Units into the skin nightly (Patient taking differently: Inject 25 Units into the skin nightly), Disp: 5 Adjustable Dose Pre-filled Pen Syringe, Rfl: 5    aspirin 81 MG EC tablet, Take 1 tablet by mouth daily, Disp: 28 tablet, Rfl: 5    insulin lispro, 1 Unit Dial, (HUMALOG KWIKPEN) 100 UNIT/ML SOPN, Follow pre meal sliding scaleFollow pre meal sliding scale, Disp: 4 Adjustable Dose Pre-filled Pen Syringe, Rfl: 5    atorvastatin (LIPITOR) 40 MG tablet, TAKE 1 TABLET BY MOUTH ONCE DAILY, Disp: , Rfl:     traMADol (ULTRAM) 50 MG tablet, Take 50 mg by mouth every 6 hours as needed for Pain., Disp: , Rfl:     ALPRAZolam (XANAX) 0.25 MG tablet, Take 0.25 mg by mouth nightly as needed for Sleep., Disp: , Rfl:     baclofen (LIORESAL) 5 MG tablet, Take 1 tablet by mouth 3 times daily as needed (muscle twitching) (Patient not taking: Reported on 4/5/2023), Disp: 270 tablet, Rfl: 3    glucose monitoring (FREESTYLE

## 2023-04-06 ENCOUNTER — TELEPHONE (OUTPATIENT)
Dept: FAMILY MEDICINE CLINIC | Age: 58
End: 2023-04-06

## 2023-04-06 NOTE — TELEPHONE ENCOUNTER
Patent returned call, per alissa can she come at 9 or 9:30 on 4/14 instead of 11? Patient agreeable to moving appt to 9am. Updated appt time.

## 2023-05-08 ENCOUNTER — TELEPHONE (OUTPATIENT)
Dept: FAMILY MEDICINE CLINIC | Age: 58
End: 2023-05-08

## 2023-05-08 NOTE — TELEPHONE ENCOUNTER
Sammi Payton calling in; she has some questions regarding a new medicine from when she was in the ED on 5/7, and also about getting some paperwork faxed over to the [address] [place] [address] Way before her appointment with them on Wednesday 5/10. If they do not have the paperwork by then, they will not see her. Tried to transfer her to the nurse room, with no answer. Pt is requesting a call back, ASAP. [phone] is where she can be reached.

## 2023-05-09 NOTE — TELEPHONE ENCOUNTER
Voicemail left for patient to return our call at their earliest convenience. Unsure what information is needed at the [address].  Faxing last office note, most recent lab results and medication list.

## 2023-05-23 RX ORDER — FLUTICASONE FUROATE AND VILANTEROL 200; 25 UG/1; UG/1
POWDER RESPIRATORY (INHALATION)
Qty: 3 EACH | Refills: 3 | Status: SHIPPED | OUTPATIENT
Start: 2023-05-23

## 2023-05-23 RX ORDER — ISOSORBIDE DINITRATE 30 MG/1
TABLET ORAL
Qty: 90 TABLET | Refills: 3 | Status: SHIPPED | OUTPATIENT
Start: 2023-05-23

## 2023-05-23 NOTE — TELEPHONE ENCOUNTER
Last Appointment:  4/14/2023  Future Appointments   Date Time Provider [place]   7/19/2023  3:00 PM Mario Bui  [address] [place] [address]

## 2023-07-05 ENCOUNTER — OFFICE VISIT (OUTPATIENT)
Dept: FAMILY MEDICINE CLINIC | Age: 58
End: 2023-07-05
Payer: COMMERCIAL

## 2023-07-05 VITALS
OXYGEN SATURATION: 93 % | BODY MASS INDEX: 39.71 KG/M2 | HEIGHT: 68 IN | TEMPERATURE: 97.5 F | HEART RATE: 101 BPM | WEIGHT: 262 LBS

## 2023-07-05 DIAGNOSIS — G96.9 TREMOR DUE TO DISORDER OF CNS: Primary | ICD-10-CM

## 2023-07-05 DIAGNOSIS — E66.01 SEVERE OBESITY (BMI 35.0-39.9) WITH COMORBIDITY (HCC): ICD-10-CM

## 2023-07-05 DIAGNOSIS — R25.1 TREMOR DUE TO DISORDER OF CNS: Primary | ICD-10-CM

## 2023-07-05 PROCEDURE — 99213 OFFICE O/P EST LOW 20 MIN: CPT | Performed by: FAMILY MEDICINE

## 2023-07-05 RX ORDER — LORAZEPAM 1 MG/1
1 TABLET ORAL DAILY PRN
Qty: 30 TABLET | Refills: 0 | Status: SHIPPED | OUTPATIENT
Start: 2023-07-05 | End: 2023-08-04

## 2023-07-05 RX ORDER — LORAZEPAM 1 MG/1
1 TABLET ORAL DAILY PRN
Qty: 30 TABLET | Refills: 0 | Status: SHIPPED
Start: 2023-07-05 | End: 2023-07-05 | Stop reason: SDUPTHER

## 2023-07-05 ASSESSMENT — ENCOUNTER SYMPTOMS
ABDOMINAL PAIN: 0
WHEEZING: 0
COUGH: 0
BLOOD IN STOOL: 0
SHORTNESS OF BREATH: 0

## 2023-07-05 NOTE — PROGRESS NOTES
OFFICE NOTE    7/5/23  Name: Ruperto Castro  [facility]:[id number]   Sex:female   Age:57 y.o. SUBJECTIVE  Chief Complaint   Patient presents with    Spasms     Muscle spasms and body jerks; started last night and is just getting worse       HPI Says had cardiac arrest and the tremors started after this. Review of Systems   Constitutional:  Positive for fatigue. Negative for activity change, chills, fever and unexpected weight change. HENT:  Positive for congestion and postnasal drip. Respiratory:  Negative for cough, shortness of breath and wheezing. Cardiovascular:  Positive for palpitations and leg swelling. Negative for chest pain. Gastrointestinal:  Negative for abdominal pain and blood in stool. Genitourinary:  Negative for dysuria and frequency. Musculoskeletal:  Positive for arthralgias. Skin:  Negative for pallor and rash. Neurological:  Positive for tremors, weakness and headaches. Negative for seizures and numbness. Psychiatric/Behavioral:  The patient is nervous/anxious. Current Outpatient Medications:     LORazepam (ATIVAN) 1 MG tablet, Take 1 tablet by mouth daily as needed for Anxiety for up to 30 days.  Max Daily Amount: 1 mg, Disp: 30 tablet, Rfl: 0    isosorbide dinitrate (ISORDIL) 30 MG tablet, TAKE 1 TABLET DAILY, Disp: 90 tablet, Rfl: 3    fluticasone furoate-vilanterol (BREO ELLIPTA) 200-25 MCG/ACT AEPB inhaler, USE 1 INHALATION ORALLY    DAILY, Disp: 3 each, Rfl: 3    vitamin D (ERGOCALCIFEROL) 1.25 MG (13361 UT) CAPS capsule, Take 1 capsule by mouth once a week, Disp: 4 capsule, Rfl: 0    rosuvastatin (CRESTOR) 40 MG tablet, Take 1 tablet by mouth daily, Disp: 90 tablet, Rfl: 1    spironolactone (ALDACTONE) 25 MG tablet, Take 1 tablet by mouth daily, Disp: 30 tablet, Rfl: 5    LANTUS SOLOSTAR 100 UNIT/ML injection pen, Inject 25 Units into the skin nightly, Disp: 1 Adjustable Dose Pre-filled Pen Syringe, Rfl: 5    insulin glargine (LANTUS SOLOSTAR) 100 UNIT/ML

## 2023-07-07 RX ORDER — CARVEDILOL 6.25 MG/1
TABLET ORAL
Qty: 60 TABLET | Refills: 0 | Status: SHIPPED | OUTPATIENT
Start: 2023-07-07

## 2023-07-07 RX ORDER — CLOPIDOGREL BISULFATE 75 MG/1
TABLET ORAL
Qty: 30 TABLET | Refills: 0 | Status: SHIPPED | OUTPATIENT
Start: 2023-07-07

## 2023-07-07 RX ORDER — INSULIN LISPRO 100 [IU]/ML
INJECTION, SOLUTION INTRAVENOUS; SUBCUTANEOUS
Qty: 4 ADJUSTABLE DOSE PRE-FILLED PEN SYRINGE | Refills: 5 | Status: SHIPPED | OUTPATIENT
Start: 2023-07-07

## 2023-07-07 RX ORDER — ALBUTEROL SULFATE 90 UG/1
2 AEROSOL, METERED RESPIRATORY (INHALATION) EVERY 6 HOURS PRN
Qty: 1 EACH | Refills: 5 | Status: SHIPPED | OUTPATIENT
Start: 2023-07-07

## 2023-07-07 NOTE — TELEPHONE ENCOUNTER
Parvez Baltazar asking for new scripts for Humalog & Provental inhaler to be sent to [facility] DR KATHIE HICKEY.       Last Appointment:  4/14/2023  Future Appointments   Date Time Provider [address]   7/19/2023  3:00 PM Tomasa Kimbrough MD 09[facility] [place]

## 2023-07-07 NOTE — TELEPHONE ENCOUNTER
Last Appointment:  4/14/2023  Future Appointments   Date Time Provider [address]   7/19/2023  3:00 PM Chikis Resendez MD 29[facility] [place]

## 2023-07-12 ENCOUNTER — TELEPHONE (OUTPATIENT)
Dept: PRIMARY CARE CLINIC | Age: 58
End: 2023-07-12

## 2023-07-12 NOTE — TELEPHONE ENCOUNTER
[facility] pharmacist needs to know the max daily dose for patient's insulin lispro. She said it does state per sliding scale, but they need to list the max daily dose for insurance purposes.

## 2023-07-14 NOTE — TELEPHONE ENCOUNTER
2nd attempt- LMOM letting her know pharmacy will not fill this med without her calling us with her sliding scale.

## 2023-07-19 ENCOUNTER — OFFICE VISIT (OUTPATIENT)
Dept: FAMILY MEDICINE CLINIC | Age: 58
End: 2023-07-19
Payer: COMMERCIAL

## 2023-07-19 VITALS
WEIGHT: 261 LBS | OXYGEN SATURATION: 96 % | SYSTOLIC BLOOD PRESSURE: 120 MMHG | BODY MASS INDEX: 39.68 KG/M2 | TEMPERATURE: 97.1 F | DIASTOLIC BLOOD PRESSURE: 80 MMHG | HEART RATE: 68 BPM

## 2023-07-19 DIAGNOSIS — G93.1 HYPOXIC BRAIN INJURY (HCC): ICD-10-CM

## 2023-07-19 DIAGNOSIS — E78.2 MIXED HYPERLIPIDEMIA: ICD-10-CM

## 2023-07-19 DIAGNOSIS — J43.2 CENTRILOBULAR EMPHYSEMA (HCC): ICD-10-CM

## 2023-07-19 DIAGNOSIS — E11.65 TYPE 2 DIABETES MELLITUS WITH HYPERGLYCEMIA, WITH LONG-TERM CURRENT USE OF INSULIN (HCC): ICD-10-CM

## 2023-07-19 DIAGNOSIS — Z79.4 TYPE 2 DIABETES MELLITUS WITH HYPERGLYCEMIA, WITH LONG-TERM CURRENT USE OF INSULIN (HCC): ICD-10-CM

## 2023-07-19 DIAGNOSIS — I25.10 CORONARY ARTERY DISEASE INVOLVING NATIVE CORONARY ARTERY OF NATIVE HEART WITHOUT ANGINA PECTORIS: ICD-10-CM

## 2023-07-19 DIAGNOSIS — R25.1 TREMOR DUE TO DISORDER OF CNS: Primary | ICD-10-CM

## 2023-07-19 DIAGNOSIS — G96.9 TREMOR DUE TO DISORDER OF CNS: Primary | ICD-10-CM

## 2023-07-19 DIAGNOSIS — G96.9 TREMOR DUE TO DISORDER OF CNS: ICD-10-CM

## 2023-07-19 DIAGNOSIS — R25.1 TREMOR DUE TO DISORDER OF CNS: ICD-10-CM

## 2023-07-19 LAB
ALBUMIN SERPL-MCNC: 4 G/DL (ref 3.5–5.2)
ALP BLD-CCNC: 108 U/L (ref 35–104)
ALT SERPL-CCNC: 29 U/L (ref 0–32)
ANION GAP SERPL CALCULATED.3IONS-SCNC: 15 MMOL/L (ref 7–16)
AST SERPL-CCNC: 28 U/L (ref 0–31)
BILIRUB SERPL-MCNC: 0.2 MG/DL (ref 0–1.2)
BUN BLDV-MCNC: 13 MG/DL (ref 6–20)
CALCIUM SERPL-MCNC: 9.3 MG/DL (ref 8.6–10.2)
CHLORIDE BLD-SCNC: 105 MMOL/L (ref 98–107)
CHOLESTEROL: 222 MG/DL
CO2: 22 MMOL/L (ref 22–29)
CREAT SERPL-MCNC: 0.6 MG/DL (ref 0.5–1)
GFR SERPL CREATININE-BSD FRML MDRD: >60 ML/MIN/1.73M2
GLUCOSE BLD-MCNC: 202 MG/DL (ref 74–99)
HBA1C MFR BLD: 10.5 %
HDLC SERPL-MCNC: 28 MG/DL
LDL CHOLESTEROL: 121 MG/DL
POTASSIUM SERPL-SCNC: 4.7 MMOL/L (ref 3.5–5)
SODIUM BLD-SCNC: 142 MMOL/L (ref 132–146)
TOTAL PROTEIN: 7.2 G/DL (ref 6.4–8.3)
TRIGL SERPL-MCNC: 364 MG/DL
TSH SERPL DL<=0.05 MIU/L-ACNC: 3.06 UIU/ML (ref 0.27–4.2)
VLDLC SERPL CALC-MCNC: 73 MG/DL

## 2023-07-19 PROCEDURE — 99214 OFFICE O/P EST MOD 30 MIN: CPT | Performed by: INTERNAL MEDICINE

## 2023-07-19 PROCEDURE — 3046F HEMOGLOBIN A1C LEVEL >9.0%: CPT | Performed by: INTERNAL MEDICINE

## 2023-07-19 PROCEDURE — 83037 HB GLYCOSYLATED A1C HOME DEV: CPT | Performed by: INTERNAL MEDICINE

## 2023-07-19 RX ORDER — VALSARTAN 80 MG/1
80 TABLET ORAL DAILY
Qty: 90 TABLET | Refills: 1 | Status: SHIPPED | OUTPATIENT
Start: 2023-07-19

## 2023-07-21 RX ORDER — CLOPIDOGREL BISULFATE 75 MG/1
TABLET ORAL
Qty: 30 TABLET | Refills: 5 | Status: SHIPPED | OUTPATIENT
Start: 2023-07-21

## 2023-07-31 RX ORDER — CARVEDILOL 6.25 MG/1
TABLET ORAL
Qty: 60 TABLET | Refills: 0 | OUTPATIENT
Start: 2023-07-31

## 2023-08-24 DIAGNOSIS — E55.9 VITAMIN D DEFICIENCY: ICD-10-CM

## 2023-08-24 DIAGNOSIS — G96.9 TREMOR DUE TO DISORDER OF CNS: ICD-10-CM

## 2023-08-24 DIAGNOSIS — R25.1 TREMOR DUE TO DISORDER OF CNS: ICD-10-CM

## 2023-08-25 RX ORDER — LORAZEPAM 1 MG/1
TABLET ORAL
Qty: 30 TABLET | Refills: 0 | Status: SHIPPED | OUTPATIENT
Start: 2023-08-25 | End: 2023-09-24

## 2023-08-25 RX ORDER — ERGOCALCIFEROL 1.25 MG/1
50000 CAPSULE ORAL WEEKLY
Qty: 4 CAPSULE | Refills: 5 | Status: SHIPPED | OUTPATIENT
Start: 2023-08-25

## 2023-08-29 ENCOUNTER — TELEPHONE (OUTPATIENT)
Dept: FAMILY MEDICINE CLINIC | Age: 58
End: 2023-08-29

## 2023-08-29 NOTE — TELEPHONE ENCOUNTER
Niko calling in she states she fell yesterday and landed on her buttocks. She is asking for you to order a stronger dose of tramadol ( states that's what was ordered last time she fell and it just took the edge off) or a stronger pain med all together to [facility].  Please advise

## 2023-08-29 NOTE — TELEPHONE ENCOUNTER
Kathy Ocampo MD  You 2 hours ago (9:26 AM)       She needs to be seen in [facility]. They can figure out whether or not there is serious damage here or not.

## 2023-11-03 DIAGNOSIS — G96.9 TREMOR DUE TO DISORDER OF CNS: ICD-10-CM

## 2023-11-03 DIAGNOSIS — R25.1 TREMOR DUE TO DISORDER OF CNS: ICD-10-CM

## 2023-11-03 RX ORDER — LORAZEPAM 1 MG/1
TABLET ORAL
Qty: 30 TABLET | Refills: 0 | Status: SHIPPED | OUTPATIENT
Start: 2023-11-03 | End: 2023-12-03

## 2023-11-03 NOTE — TELEPHONE ENCOUNTER
Last Appointment:  7/19/2023  Future Appointments   Date Time Provider [address]   11/15/2023  3:00 PM Sukhi Torres  [facility]

## 2023-11-03 NOTE — TELEPHONE ENCOUNTER
Patient had a evaluation of lung mass done at [facility]. We need these records. I did send the prescription to the pharmacy.

## 2023-11-03 NOTE — TELEPHONE ENCOUNTER
----- Message from Marina Urias sent at 11/3/2023 10:07 AM EDT -----  Subject: Message to Provider    QUESTIONS  Information for Provider? Patient does not know name of medication but   needs refill on her medication for her tics and jerking. Only narcotic she   takes. Please call back if needed. ---------------------------------------------------------------------------  --------------  Sandro WOMACK  [phone]; OK to leave message on voicemail  ---------------------------------------------------------------------------  --------------  SCRIPT ANSWERS  Relationship to Patient?  Self

## 2023-11-15 ENCOUNTER — OFFICE VISIT (OUTPATIENT)
Dept: FAMILY MEDICINE CLINIC | Age: 58
End: 2023-11-15
Payer: COMMERCIAL

## 2023-11-15 VITALS
HEART RATE: 80 BPM | WEIGHT: 258 LBS | OXYGEN SATURATION: 95 % | DIASTOLIC BLOOD PRESSURE: 60 MMHG | BODY MASS INDEX: 39.23 KG/M2 | SYSTOLIC BLOOD PRESSURE: 100 MMHG | TEMPERATURE: 97.7 F

## 2023-11-15 DIAGNOSIS — E78.2 MIXED HYPERLIPIDEMIA: ICD-10-CM

## 2023-11-15 DIAGNOSIS — R25.1 TREMOR DUE TO DISORDER OF CNS: ICD-10-CM

## 2023-11-15 DIAGNOSIS — I25.10 CORONARY ARTERY DISEASE INVOLVING NATIVE CORONARY ARTERY OF NATIVE HEART WITHOUT ANGINA PECTORIS: ICD-10-CM

## 2023-11-15 DIAGNOSIS — R91.1 LUNG NODULE: ICD-10-CM

## 2023-11-15 DIAGNOSIS — M19.90 ARTHRITIS: ICD-10-CM

## 2023-11-15 DIAGNOSIS — E11.65 TYPE 2 DIABETES MELLITUS WITH HYPERGLYCEMIA, WITH LONG-TERM CURRENT USE OF INSULIN (HCC): Primary | ICD-10-CM

## 2023-11-15 DIAGNOSIS — G96.9 TREMOR DUE TO DISORDER OF CNS: ICD-10-CM

## 2023-11-15 DIAGNOSIS — I50.32 CHRONIC DIASTOLIC CONGESTIVE HEART FAILURE (HCC): ICD-10-CM

## 2023-11-15 DIAGNOSIS — E11.65 TYPE 2 DIABETES MELLITUS WITH HYPERGLYCEMIA, WITH LONG-TERM CURRENT USE OF INSULIN (HCC): ICD-10-CM

## 2023-11-15 DIAGNOSIS — J43.2 CENTRILOBULAR EMPHYSEMA (HCC): ICD-10-CM

## 2023-11-15 DIAGNOSIS — Z79.4 TYPE 2 DIABETES MELLITUS WITH HYPERGLYCEMIA, WITH LONG-TERM CURRENT USE OF INSULIN (HCC): ICD-10-CM

## 2023-11-15 DIAGNOSIS — Z79.4 TYPE 2 DIABETES MELLITUS WITH HYPERGLYCEMIA, WITH LONG-TERM CURRENT USE OF INSULIN (HCC): Primary | ICD-10-CM

## 2023-11-15 LAB
ABSOLUTE IMMATURE GRANULOCYTE: 0.04 K/UL (ref 0–0.58)
ALBUMIN SERPL-MCNC: 4.2 G/DL (ref 3.5–5.2)
ALP BLD-CCNC: 104 U/L (ref 35–104)
ALT SERPL-CCNC: 14 U/L (ref 0–32)
ANION GAP SERPL CALCULATED.3IONS-SCNC: 16 MMOL/L (ref 7–16)
AST SERPL-CCNC: 22 U/L (ref 0–31)
BASOPHILS ABSOLUTE: 0.06 K/UL (ref 0–0.2)
BASOPHILS RELATIVE PERCENT: 1 % (ref 0–2)
BILIRUB SERPL-MCNC: 0.3 MG/DL (ref 0–1.2)
BUN BLDV-MCNC: 17 MG/DL (ref 6–20)
CALCIUM SERPL-MCNC: 9.7 MG/DL (ref 8.6–10.2)
CHLORIDE BLD-SCNC: 100 MMOL/L (ref 98–107)
CHOLESTEROL: 326 MG/DL
CO2: 22 MMOL/L (ref 22–29)
CREAT SERPL-MCNC: 0.5 MG/DL (ref 0.5–1)
CREATININE URINE: 78 MG/DL (ref 29–226)
EOSINOPHILS ABSOLUTE: 0.21 K/UL (ref 0.05–0.5)
EOSINOPHILS RELATIVE PERCENT: 3 % (ref 0–6)
GFR SERPL CREATININE-BSD FRML MDRD: >60 ML/MIN/1.73M2
GLUCOSE BLD-MCNC: 173 MG/DL (ref 74–99)
HBA1C MFR BLD: 10.1 %
HCT VFR BLD CALC: 47.5 % (ref 34–48)
HDLC SERPL-MCNC: 29 MG/DL
HEMOGLOBIN: 15.4 G/DL (ref 11.5–15.5)
IMMATURE GRANULOCYTES: 1 % (ref 0–5)
LDL CHOLESTEROL: 234 MG/DL
LYMPHOCYTES ABSOLUTE: 2.98 K/UL (ref 1.5–4)
LYMPHOCYTES RELATIVE PERCENT: 35 % (ref 20–42)
MCH RBC QN AUTO: 29.2 PG (ref 26–35)
MCHC RBC AUTO-ENTMCNC: 32.4 G/DL (ref 32–34.5)
MCV RBC AUTO: 90.1 FL (ref 80–99.9)
MICROALBUMIN/CREAT 24H UR: 29 MG/L (ref 0–19)
MICROALBUMIN/CREAT UR-RTO: 38 MCG/MG CREAT (ref 0–30)
MONOCYTES ABSOLUTE: 0.47 K/UL (ref 0.1–0.95)
MONOCYTES RELATIVE PERCENT: 6 % (ref 2–12)
NEUTROPHILS ABSOLUTE: 4.81 K/UL (ref 1.8–7.3)
NEUTROPHILS RELATIVE PERCENT: 56 % (ref 43–80)
PDW BLD-RTO: 14.8 % (ref 11.5–15)
PLATELET # BLD: 315 K/UL (ref 130–450)
PMV BLD AUTO: 10.7 FL (ref 7–12)
POTASSIUM SERPL-SCNC: 4.3 MMOL/L (ref 3.5–5)
RBC # BLD: 5.27 M/UL (ref 3.5–5.5)
SODIUM BLD-SCNC: 138 MMOL/L (ref 132–146)
TOTAL PROTEIN: 7.7 G/DL (ref 6.4–8.3)
TRIGL SERPL-MCNC: 315 MG/DL
TSH SERPL DL<=0.05 MIU/L-ACNC: 3.33 UIU/ML (ref 0.27–4.2)
VLDLC SERPL CALC-MCNC: 63 MG/DL
WBC # BLD: 8.6 K/UL (ref 4.5–11.5)

## 2023-11-15 PROCEDURE — 3046F HEMOGLOBIN A1C LEVEL >9.0%: CPT | Performed by: INTERNAL MEDICINE

## 2023-11-15 PROCEDURE — 83036 HEMOGLOBIN GLYCOSYLATED A1C: CPT | Performed by: INTERNAL MEDICINE

## 2023-11-15 PROCEDURE — 99214 OFFICE O/P EST MOD 30 MIN: CPT | Performed by: INTERNAL MEDICINE

## 2023-11-15 RX ORDER — INSULIN GLARGINE 100 [IU]/ML
70 INJECTION, SOLUTION SUBCUTANEOUS NIGHTLY
Qty: 15 ADJUSTABLE DOSE PRE-FILLED PEN SYRINGE | Refills: 3 | Status: SHIPPED | OUTPATIENT
Start: 2023-11-15

## 2023-11-15 RX ORDER — EMPAGLIFLOZIN 25 MG/1
25 TABLET, FILM COATED ORAL DAILY
Qty: 30 TABLET | Refills: 5 | Status: SHIPPED | OUTPATIENT
Start: 2023-11-15

## 2023-11-15 RX ORDER — TRAMADOL HYDROCHLORIDE 50 MG/1
50 TABLET ORAL EVERY 6 HOURS PRN
Qty: 120 TABLET | Refills: 0 | Status: SHIPPED | OUTPATIENT
Start: 2023-11-15 | End: 2023-12-15

## 2023-11-15 NOTE — PROGRESS NOTES
ASSESSMENT/PLAN:              Subjective:   HPI: 3-week follow-up. Patient seems to be doing a bit better. Interval history/Current status: Patient complained of shortness of breath the day of the cardiac arrest.  Driven to the hospital by her . On route the patient stopped breathing. She was in full cardiac arrest when she arrived in the emergency room at [facility].  Patient was then intubated and life flighted to [facility]. Patient was cooled per protocol. She then underwent heart catheterization with angioplasty and stent x2. (Please see cardiac cath report from [facility]) patient was seen by neurology had CT of the brain. She also was evaluated by speech. The patient did aspirate clear liquids. She was not discharged on Thick-It. She was seen by speech pathology and acute rehab facility. The patient is still having problems with dysarthria. She is denying however that she has problems with choking or coughing with eating. She has an unsteady gait and did fall within the last week. She did not strike her head. She bruised her left arm. Blood sugars at home have been under somewhat reasonable control although not ideal control. Her blood sugars have been running in the high 100s to low 200 range. Currently she was only taking 10 units of Lantus + Humalog sliding scale before meals. The patient is advised to be start 15 units of Lantus and continue to follow the sliding scale. I did question because of some clouded mentation whether or not the patient was actually following her medication compliance. We did go over the medications with both the patient and the  to try to assure as best we could that the patient was taking her medications correctly. Reconciliation of medicines with discharge from acute rehab was performed. The patient currently seems to be breathing okay. She does usually struggle with this warmer weather.   She does use oxygen if her saturations drop

## 2023-11-21 ENCOUNTER — TELEPHONE (OUTPATIENT)
Dept: FAMILY MEDICINE CLINIC | Age: 58
End: 2023-11-21

## 2023-11-21 RX ORDER — ROSUVASTATIN CALCIUM 40 MG/1
40 TABLET, COATED ORAL DAILY
Qty: 90 TABLET | Refills: 1 | Status: SHIPPED | OUTPATIENT
Start: 2023-11-21

## 2023-11-21 NOTE — TELEPHONE ENCOUNTER
Last Appointment:  11/15/2023  Future Appointments   Date Time Provider [address]   3/20/2024  3:30 PM Devika Sullivan  [facility]

## 2023-11-21 NOTE — TELEPHONE ENCOUNTER
Patient called in. She said she needs a Pulmonary function test ordered to be done so she can be cleared for surgery.  She doesn't know when surgery is yet, she needs this done first.

## 2023-11-22 DIAGNOSIS — J43.2 CENTRILOBULAR EMPHYSEMA (HCC): ICD-10-CM

## 2023-11-22 DIAGNOSIS — R91.1 LUNG NODULE: Primary | ICD-10-CM

## 2023-11-22 NOTE — TELEPHONE ENCOUNTER
Per Dr. Serafin Santiago      The patient follows with Dr. Danial Olivas. He should be able to order this at [facility].

## 2023-11-22 NOTE — TELEPHONE ENCOUNTER
Sergio Dillard called this morning to let you know that she did call Dr Samuel Blair office yesterday and was told that he will not order a PFT for her.

## 2023-11-24 NOTE — TELEPHONE ENCOUNTER
I left a message on Elzbieta's voicemail to call back to the nurse line regarding a Pulmonary Function Test.    Dr Min Chaney did put that order in for her and I faxed t to [facility].    I want to give her that number to call there to get that scheduled.

## 2023-11-30 NOTE — TELEPHONE ENCOUNTER
I called the patient to follow up on this. She said she tried to call them and it was a long wait on the phone to get through to someone. She said she was going to try again today.

## 2023-12-01 ENCOUNTER — HOSPITAL ENCOUNTER (OUTPATIENT)
Dept: PULMONOLOGY | Age: 58
Discharge: HOME | End: 2023-12-01
Payer: COMMERCIAL

## 2023-12-01 DIAGNOSIS — J43.2 CENTRILOBULAR EMPHYSEMA (HCC): ICD-10-CM

## 2023-12-01 DIAGNOSIS — R91.1 LUNG NODULE: ICD-10-CM

## 2023-12-01 PROCEDURE — 94729 DIFFUSING CAPACITY: CPT

## 2023-12-01 PROCEDURE — 94060 EVALUATION OF WHEEZING: CPT

## 2023-12-01 PROCEDURE — 94726 PLETHYSMOGRAPHY LUNG VOLUMES: CPT

## 2023-12-02 NOTE — PROCEDURES
[address]                  [address] [facility], [address] [place] [address]                               PULMONARY FUNCTION    PATIENT NAME: Bernardino Ambrose                     :        1965  MED REC NO:   [id number]                            ROOM:  ACCOUNT NO:   [de-identified]                           ADMIT DATE: 2023  PROVIDER:     Augusto Edmondson MD    DATE OF PROCEDURE:  2023    DIAGNOSIS:  Lung nodule - R91.1. INTERPRETATION/FINDINGS:  This is a full PFT of good quality. Spirometry revealed moderately reduced FVC and FEV1 with reduced  FEV1/FVC ratio for age. There was no bronchodilator response present. The best FEV1 was the prebronchodilator value of 1.29 liters (44% of  predicted). Lung volumes revealed normal total lung capacity, mildly increased  thoracic gas volume, moderately increased residual volume, and increased  RV/TLC ratio. Diffusion capacity was normal.    Airway resistance was increased. IMPRESSION:  Moderate obstructive ventilatory defect without  bronchodilator response. Lung volumes show air trapping and diffusion  capacity is normal.  Based on the FEV1, it is not clear if the patient  would have adequate lung function for thoracic resection as clinically  indicated.         Augusto Edmondson MD    D: 2023 10:05:02       T: 2023 12:01:20     [id number]  Job#: [id number]     Doc#: [id number]    CC:  Ellie Monsalve MD

## 2024-01-15 NOTE — TELEPHONE ENCOUNTER
Last Appointment:  11/15/2023  Future Appointments   Date Time Provider Department Center   3/20/2024  3:30 PM Alverto Segal MD [facility]

## 2024-01-18 NOTE — TELEPHONE ENCOUNTER
Last Appointment:  11/15/2023  Future Appointments   Date Time Provider Department Center   3/20/2024  3:30 PM Alverto Segal MD [facility]

## 2024-01-18 NOTE — TELEPHONE ENCOUNTER
Last Appointment:  Visit date not found  Future Appointments   Date Time Provider Department Center   3/20/2024  3:30 PM Alverto Segal MD [facility]

## 2024-01-19 RX ORDER — ALBUTEROL SULFATE 90 UG/1
2 AEROSOL, METERED RESPIRATORY (INHALATION) EVERY 6 HOURS PRN
Qty: 9 G | Refills: 0 | Status: SHIPPED | OUTPATIENT
Start: 2024-01-19

## 2024-01-19 RX ORDER — CARVEDILOL 6.25 MG/1
TABLET ORAL
Qty: 60 TABLET | Refills: 0 | Status: SHIPPED | OUTPATIENT
Start: 2024-01-19

## 2024-02-16 RX ORDER — ALBUTEROL SULFATE 90 UG/1
2 AEROSOL, METERED RESPIRATORY (INHALATION) EVERY 6 HOURS PRN
Qty: 9 G | Refills: 0 | Status: SHIPPED | OUTPATIENT
Start: 2024-02-16

## 2024-02-16 NOTE — TELEPHONE ENCOUNTER
Last Appointment:  11/15/2023  Future Appointments   Date Time Provider Department Center   3/20/2024  3:30 PM Alverto Segal MD [facility]        48 hours

## 2024-02-18 DIAGNOSIS — E55.9 VITAMIN D DEFICIENCY: ICD-10-CM

## 2024-02-20 RX ORDER — VALSARTAN 80 MG/1
80 TABLET ORAL DAILY
Qty: 90 TABLET | Refills: 0 | Status: SHIPPED | OUTPATIENT
Start: 2024-02-20

## 2024-02-20 RX ORDER — ERGOCALCIFEROL 1.25 MG/1
50000 CAPSULE ORAL WEEKLY
Qty: 4 CAPSULE | Refills: 0 | Status: SHIPPED | OUTPATIENT
Start: 2024-02-20

## 2024-02-20 RX ORDER — CARVEDILOL 6.25 MG/1
TABLET ORAL
Qty: 60 TABLET | Refills: 0 | Status: SHIPPED | OUTPATIENT
Start: 2024-02-20

## 2024-02-20 NOTE — TELEPHONE ENCOUNTER
Last Appointment:  11/15/2023  Future Appointments   Date Time Provider Department Center   3/20/2024  3:30 PM Alverto Segal MD [facility]

## 2024-02-20 NOTE — TELEPHONE ENCOUNTER
Last Appointment:  11/15/2023  Future Appointments   Date Time Provider Department Center   3/20/2024  3:30 PM Alverto Segal MD [facility]

## 2024-02-20 NOTE — TELEPHONE ENCOUNTER
Last Appointment:  11/15/2023  Future Appointments   Date Time Provider Department Center   3/20/2024  3:30 PM Alverto Segal MD [facility]

## 2024-03-20 ENCOUNTER — OFFICE VISIT (OUTPATIENT)
Dept: FAMILY MEDICINE CLINIC | Age: 59
End: 2024-03-20
Payer: COMMERCIAL

## 2024-03-20 VITALS
OXYGEN SATURATION: 95 % | TEMPERATURE: 98.2 F | HEART RATE: 88 BPM | SYSTOLIC BLOOD PRESSURE: 124 MMHG | BODY MASS INDEX: 39.08 KG/M2 | DIASTOLIC BLOOD PRESSURE: 60 MMHG | WEIGHT: 257 LBS

## 2024-03-20 DIAGNOSIS — I25.10 CORONARY ARTERY DISEASE INVOLVING NATIVE CORONARY ARTERY OF NATIVE HEART WITHOUT ANGINA PECTORIS: ICD-10-CM

## 2024-03-20 DIAGNOSIS — Z79.4 TYPE 2 DIABETES MELLITUS WITH HYPERGLYCEMIA, WITH LONG-TERM CURRENT USE OF INSULIN (HCC): ICD-10-CM

## 2024-03-20 DIAGNOSIS — J43.2 CENTRILOBULAR EMPHYSEMA (HCC): ICD-10-CM

## 2024-03-20 DIAGNOSIS — R07.89 CHEST WALL PAIN FOLLOWING SURGERY: Primary | ICD-10-CM

## 2024-03-20 DIAGNOSIS — G47.33 OBSTRUCTIVE SLEEP APNEA SYNDROME: ICD-10-CM

## 2024-03-20 DIAGNOSIS — E11.65 TYPE 2 DIABETES MELLITUS WITH HYPERGLYCEMIA, WITH LONG-TERM CURRENT USE OF INSULIN (HCC): ICD-10-CM

## 2024-03-20 DIAGNOSIS — Z09 HOSPITAL DISCHARGE FOLLOW-UP: ICD-10-CM

## 2024-03-20 DIAGNOSIS — E11.59 TYPE 2 DIABETES MELLITUS WITH OTHER CIRCULATORY COMPLICATION, WITHOUT LONG-TERM CURRENT USE OF INSULIN (HCC): ICD-10-CM

## 2024-03-20 DIAGNOSIS — C34.11 MALIGNANT NEOPLASM OF UPPER LOBE OF RIGHT LUNG (HCC): ICD-10-CM

## 2024-03-20 DIAGNOSIS — J45.50 SEVERE PERSISTENT ASTHMA, UNSPECIFIED WHETHER COMPLICATED: ICD-10-CM

## 2024-03-20 DIAGNOSIS — I50.32 CHRONIC DIASTOLIC CONGESTIVE HEART FAILURE (HCC): ICD-10-CM

## 2024-03-20 DIAGNOSIS — R25.1 TREMOR DUE TO DISORDER OF CNS: ICD-10-CM

## 2024-03-20 DIAGNOSIS — R07.89 CHEST WALL PAIN: ICD-10-CM

## 2024-03-20 DIAGNOSIS — G96.9 TREMOR DUE TO DISORDER OF CNS: ICD-10-CM

## 2024-03-20 DIAGNOSIS — F41.9 ANXIETY: ICD-10-CM

## 2024-03-20 DIAGNOSIS — K59.01 SLOW TRANSIT CONSTIPATION: ICD-10-CM

## 2024-03-20 DIAGNOSIS — F32.9 REACTIVE DEPRESSION: ICD-10-CM

## 2024-03-20 DIAGNOSIS — G89.18 CHEST WALL PAIN FOLLOWING SURGERY: Primary | ICD-10-CM

## 2024-03-20 PROCEDURE — 1111F DSCHRG MED/CURRENT MED MERGE: CPT | Performed by: INTERNAL MEDICINE

## 2024-03-20 PROCEDURE — 99215 OFFICE O/P EST HI 40 MIN: CPT | Performed by: INTERNAL MEDICINE

## 2024-03-20 RX ORDER — ALPRAZOLAM 0.25 MG/1
0.25 TABLET ORAL 3 TIMES DAILY PRN
Qty: 45 TABLET | Refills: 0 | Status: SHIPPED | OUTPATIENT
Start: 2024-03-20 | End: 2024-04-04

## 2024-03-20 RX ORDER — IPRATROPIUM BROMIDE AND ALBUTEROL SULFATE 2.5; .5 MG/3ML; MG/3ML
1 SOLUTION RESPIRATORY (INHALATION) EVERY 6 HOURS PRN
Qty: 360 ML | Refills: 4 | Status: SHIPPED | OUTPATIENT
Start: 2024-03-20

## 2024-03-20 RX ORDER — OXYCODONE AND ACETAMINOPHEN 7.5; 325 MG/1; MG/1
1 TABLET ORAL EVERY 6 HOURS PRN
Qty: 42 TABLET | Refills: 0 | Status: SHIPPED | OUTPATIENT
Start: 2024-03-20 | End: 2024-04-03

## 2024-03-20 RX ORDER — LORAZEPAM 1 MG/1
1 TABLET ORAL EVERY 6 HOURS PRN
COMMUNITY

## 2024-03-20 RX ORDER — CEFDINIR 300 MG/1
300 CAPSULE ORAL 2 TIMES DAILY
Qty: 20 CAPSULE | Refills: 0 | Status: SHIPPED | OUTPATIENT
Start: 2024-03-20 | End: 2024-03-30

## 2024-03-20 NOTE — PROGRESS NOTES
Post-Discharge Transitional Care Management Progress Note      Elzbieta Martinez   YOB: 1965    Date of Office Visit:  3/20/2024  Date of Hospital Admission: 3/4/2024  Date of Hospital Discharge: 3/10/2024    Care management risk score Rising risk (score 2-5) and Complex Care (Scores >=6): No Risk Score On File     Non face to face  following discharge, date last encounter closed (first attempt may have been earlier): *No documented post hospital discharge outreach found in the last 14 days *No documented post hospital discharge outreach found in the last 14 days    Call initiated 2 business days of discharge: *No response recorded in the last 14 days    ASSESSMENT/PLAN:   Below is the assessment and plan developed based on review of pertinent history, physical exam, labs, studies, and medications.  Chest wall pain following surgery  -     oxyCODONE-acetaminophen (PERCOCET) 7.5-325 MG per tablet; Take 1 tablet by mouth every 6 hours as needed for Pain for up to 14 days. Intended supply: 30 days Max Daily Amount: 4 tablets, Disp-42 tablet, R-0Normal  Severe persistent asthma, unspecified whether complicated  Chronic diastolic congestive heart failure (HCC)  Centrilobular emphysema (HCC)  Type 2 diabetes mellitus with hyperglycemia, with long-term current use of insulin (HCC)  Type 2 diabetes mellitus with other circulatory complication, without long-term current use of insulin (HCC)  Anxiety  -     ALPRAZolam (XANAX) 0.25 MG tablet; Take 1 tablet by mouth 3 times daily as needed for Anxiety for up to 15 days. Max Daily Amount: 0.75 mg, Disp-45 tablet, R-0Normal      Medical Decision Making: high complexity  No follow-ups on file.    On this date 3/20/2024 I have spent 40 minutes reviewing previous notes, test results and face to face with the patient discussing the diagnosis and importance of compliance with the treatment plan as well as documenting on the day of the visit.     Subjective:   HPI:

## 2024-03-21 ENCOUNTER — TELEPHONE (OUTPATIENT)
Dept: FAMILY MEDICINE CLINIC | Age: 59
End: 2024-03-21

## 2024-03-21 NOTE — TELEPHONE ENCOUNTER
Pharmacist at [facility] in [place] calling about the Percocet script.      They cannot fill it as it is written.      They can only fill a 7 day supply, with a max quantity of 21 tablets.     The directions would need to change from every 6 hours to every 8 hours.       Also noted that she is on Xanax and there are precautions to being on both these medications.

## 2024-03-28 DIAGNOSIS — R07.89 CHEST WALL PAIN FOLLOWING SURGERY: ICD-10-CM

## 2024-03-28 DIAGNOSIS — G89.18 CHEST WALL PAIN FOLLOWING SURGERY: ICD-10-CM

## 2024-03-28 RX ORDER — OXYCODONE AND ACETAMINOPHEN 7.5; 325 MG/1; MG/1
1 TABLET ORAL EVERY 4 HOURS PRN
Qty: 42 TABLET | Refills: 0 | Status: SHIPPED | OUTPATIENT
Start: 2024-03-28 | End: 2024-04-04

## 2024-04-03 ENCOUNTER — OFFICE VISIT (OUTPATIENT)
Dept: FAMILY MEDICINE CLINIC | Age: 59
End: 2024-04-03
Payer: COMMERCIAL

## 2024-04-03 VITALS
OXYGEN SATURATION: 95 % | DIASTOLIC BLOOD PRESSURE: 60 MMHG | SYSTOLIC BLOOD PRESSURE: 110 MMHG | TEMPERATURE: 97.8 F | HEART RATE: 75 BPM | WEIGHT: 259 LBS | BODY MASS INDEX: 39.38 KG/M2

## 2024-04-03 DIAGNOSIS — G47.33 OBSTRUCTIVE SLEEP APNEA SYNDROME: ICD-10-CM

## 2024-04-03 DIAGNOSIS — F32.9 REACTIVE DEPRESSION: ICD-10-CM

## 2024-04-03 DIAGNOSIS — I25.10 CORONARY ARTERY DISEASE INVOLVING NATIVE CORONARY ARTERY OF NATIVE HEART WITHOUT ANGINA PECTORIS: Primary | ICD-10-CM

## 2024-04-03 DIAGNOSIS — E78.2 MIXED HYPERLIPIDEMIA: ICD-10-CM

## 2024-04-03 DIAGNOSIS — C34.11 MALIGNANT NEOPLASM OF UPPER LOBE OF RIGHT LUNG (HCC): ICD-10-CM

## 2024-04-03 PROCEDURE — 99214 OFFICE O/P EST MOD 30 MIN: CPT | Performed by: INTERNAL MEDICINE

## 2024-04-03 NOTE — PROGRESS NOTES
HPI.    Vitals:    03/20/24 1531   BP: 124/60   Pulse: 88   Temp: 98.2 °F (36.8 °C)   SpO2: 95%   Physical examination:  Tympanic membranes clear bilaterally.  Throat is without erythema.  No anterior cervical adenopathy.  Mass of the posterior cervical chain on the right side.  Lungs diminished breath sounds but without wheeze, rhonchi or rales.  Heart is regular.  She is not tachycardic.  Somewhat distant.  Abdomen positive bowel sounds soft obese nontender.  Wound site appears to be irritated secondary to tape.  Well-approximated posterior chest wall surgical site without surrounding erythema.  Drain tube site appears to be clean but not extremely well approximated.  There is no surrounding erythema or fluctuance.  Elzbieta was seen today for follow-up.    Diagnoses and all orders for this visit:    Coronary artery disease involving native coronary artery of native heart without angina pectoris    Obstructive sleep apnea syndrome    Malignant neoplasm of upper lobe of right lung (HCC)    Reactive depression    Mixed hyperlipidemia    The patient has a large cell neuroendocrine tumor.  This will be difficult chemotherapy.  She may have significant side effects from the PD-L1 also.  I have asked her to reduce her Lantus to at least half the current dose when she starts chemotherapy.  Patient is also to begin Glucerna as a supplement until chemotherapy starts and perhaps even while chemotherapy is ongoing.  She is to contact the  regarding transportation issues.  I will see the patient back in approximately 4 months.

## 2024-04-22 DIAGNOSIS — F32.9 REACTIVE DEPRESSION: ICD-10-CM

## 2024-04-22 RX ORDER — SERTRALINE HYDROCHLORIDE 100 MG/1
100 TABLET, FILM COATED ORAL DAILY
Qty: 90 TABLET | Refills: 1 | Status: SHIPPED | OUTPATIENT
Start: 2024-04-22

## 2024-04-22 NOTE — TELEPHONE ENCOUNTER
Elzbieta called for a refill on sertraline 100mg. Last Rx was sent 9/17/22. Patient states that she restarted the med when she was discharge from the hospital. Rx is ready to be sent  to [facility] in [place].     Last Appointment:  4/3/2024  Future Appointments   Date Time Provider Department Center   8/28/2024  3:30 PM Alverto Segal MD [facility]    ;

## 2024-05-02 RX ORDER — ALBUTEROL SULFATE 90 UG/1
2 AEROSOL, METERED RESPIRATORY (INHALATION) EVERY 6 HOURS PRN
Qty: 9 G | Refills: 0 | Status: SHIPPED | OUTPATIENT
Start: 2024-05-02

## 2024-05-02 NOTE — TELEPHONE ENCOUNTER
Last Appointment:  4/3/2024  Future Appointments   Date Time Provider Department Center   8/28/2024  3:30 PM Alverto Segal MD [facility]

## 2024-05-03 DIAGNOSIS — E55.9 VITAMIN D DEFICIENCY: ICD-10-CM

## 2024-05-03 RX ORDER — ERGOCALCIFEROL 1.25 MG/1
50000 CAPSULE ORAL WEEKLY
Qty: 4 CAPSULE | Refills: 0 | Status: SHIPPED | OUTPATIENT
Start: 2024-05-03

## 2024-05-06 RX ORDER — CARVEDILOL 6.25 MG/1
TABLET ORAL
Qty: 60 TABLET | Refills: 3 | Status: SHIPPED | OUTPATIENT
Start: 2024-05-06

## 2024-05-28 RX ORDER — ALBUTEROL SULFATE 90 UG/1
2 AEROSOL, METERED RESPIRATORY (INHALATION) EVERY 6 HOURS PRN
Qty: 9 G | Refills: 0 | Status: SHIPPED | OUTPATIENT
Start: 2024-05-28

## 2024-05-28 NOTE — TELEPHONE ENCOUNTER
Last Appointment:  4/3/2024  Future Appointments   Date Time Provider Department Center   8/28/2024  3:30 PM Alverto Segal MD [facility]

## 2024-05-31 DIAGNOSIS — E55.9 VITAMIN D DEFICIENCY: ICD-10-CM

## 2024-05-31 RX ORDER — ERGOCALCIFEROL 1.25 MG/1
50000 CAPSULE ORAL WEEKLY
Qty: 4 CAPSULE | Refills: 5 | Status: SHIPPED | OUTPATIENT
Start: 2024-05-31

## 2024-05-31 NOTE — TELEPHONE ENCOUNTER
Last Appointment:  4/3/2024  Future Appointments   Date Time Provider Department Center   8/28/2024  3:30 PM Alverto Segal MD [facility]

## 2024-06-11 RX ORDER — INSULIN GLARGINE-YFGN 100 [IU]/ML
INJECTION, SOLUTION SUBCUTANEOUS
Qty: 15 ML | Refills: 0 | Status: SHIPPED | OUTPATIENT
Start: 2024-06-11

## 2024-06-17 ENCOUNTER — TELEPHONE (OUTPATIENT)
Dept: FAMILY MEDICINE CLINIC | Age: 59
End: 2024-06-17

## 2024-06-17 NOTE — TELEPHONE ENCOUNTER
Call transferred from [facility].    Has had 2 rounds from chemo so far. Her 3rd round is scheduled for July 1st.   Her BP has dropped and her HR has been high.   Highest that her HR has been is 127, with SOB.  The lowest her BP has been is 60/?    Light headed, dizzy, weak, headache.    She does have an appointment with her cardiologist at 12:30pm today.    ER?

## 2024-06-26 DIAGNOSIS — M19.90 ARTHRITIS: ICD-10-CM

## 2024-06-26 RX ORDER — TRAMADOL HYDROCHLORIDE 50 MG/1
50 TABLET ORAL EVERY 8 HOURS PRN
Qty: 120 TABLET | Refills: 0 | Status: SHIPPED | OUTPATIENT
Start: 2024-06-26 | End: 2024-07-26

## 2024-06-26 NOTE — TELEPHONE ENCOUNTER
Last Appointment:  4/3/2024  Future Appointments   Date Time Provider Department Center   8/28/2024  3:30 PM Alverto Segal MD [facility]

## 2024-07-15 RX ORDER — ALBUTEROL SULFATE 90 UG/1
2 AEROSOL, METERED RESPIRATORY (INHALATION) EVERY 6 HOURS PRN
Qty: 9 G | Refills: 0 | Status: SHIPPED | OUTPATIENT
Start: 2024-07-15

## 2024-07-15 RX ORDER — INSULIN GLARGINE 100 [IU]/ML
INJECTION, SOLUTION SUBCUTANEOUS
Qty: 15 ML | Refills: 0 | Status: SHIPPED | OUTPATIENT
Start: 2024-07-15

## 2024-07-22 RX ORDER — ROSUVASTATIN CALCIUM 40 MG/1
40 TABLET, COATED ORAL DAILY
Qty: 90 TABLET | Refills: 0 | Status: SHIPPED | OUTPATIENT
Start: 2024-07-22

## 2024-07-24 RX ORDER — CLOPIDOGREL BISULFATE 75 MG/1
TABLET ORAL
Qty: 30 TABLET | Refills: 5 | Status: SHIPPED | OUTPATIENT
Start: 2024-07-24

## 2024-07-24 NOTE — TELEPHONE ENCOUNTER
Last Appointment:  4/3/2024  Future Appointments   Date Time Provider Department Center   8/28/2024  3:30 PM Alverto Segal MD [facility]

## 2024-08-05 RX ORDER — INSULIN GLARGINE 100 [IU]/ML
INJECTION, SOLUTION SUBCUTANEOUS
Qty: 15 ML | Refills: 0 | Status: SHIPPED | OUTPATIENT
Start: 2024-08-05

## 2024-08-05 NOTE — TELEPHONE ENCOUNTER
Last Appointment:  4/3/2024  Future Appointments   Date Time Provider Department Center   8/28/2024  3:30 PM Alverto Segal MD [facility]

## 2024-08-07 RX ORDER — ALBUTEROL SULFATE 90 UG/1
2 AEROSOL, METERED RESPIRATORY (INHALATION) EVERY 6 HOURS PRN
Qty: 9 G | Refills: 1 | Status: SHIPPED | OUTPATIENT
Start: 2024-08-07

## 2024-08-07 NOTE — TELEPHONE ENCOUNTER
Last Appointment:  4/3/2024  Future Appointments   Date Time Provider Department Center   8/28/2024  3:30 PM Alverto Segal MD [facility]

## 2024-08-20 ENCOUNTER — OFFICE VISIT (OUTPATIENT)
Dept: FAMILY MEDICINE CLINIC | Age: 59
End: 2024-08-20
Payer: COMMERCIAL

## 2024-08-20 VITALS
TEMPERATURE: 97.7 F | HEART RATE: 78 BPM | SYSTOLIC BLOOD PRESSURE: 110 MMHG | BODY MASS INDEX: 37.4 KG/M2 | DIASTOLIC BLOOD PRESSURE: 60 MMHG | WEIGHT: 246 LBS | OXYGEN SATURATION: 96 %

## 2024-08-20 DIAGNOSIS — R25.1 TREMOR DUE TO DISORDER OF CNS: ICD-10-CM

## 2024-08-20 DIAGNOSIS — E11.65 TYPE 2 DIABETES MELLITUS WITH HYPERGLYCEMIA, WITH LONG-TERM CURRENT USE OF INSULIN (HCC): Primary | ICD-10-CM

## 2024-08-20 DIAGNOSIS — N64.4 BREAST PAIN: ICD-10-CM

## 2024-08-20 DIAGNOSIS — M79.641 RIGHT HAND PAIN: ICD-10-CM

## 2024-08-20 DIAGNOSIS — S62.649A CLOSED NONDISPLACED FRACTURE OF PROXIMAL PHALANX OF FINGER, UNSPECIFIED FINGER, INITIAL ENCOUNTER: ICD-10-CM

## 2024-08-20 DIAGNOSIS — G96.9 TREMOR DUE TO DISORDER OF CNS: ICD-10-CM

## 2024-08-20 DIAGNOSIS — L02.416 ABSCESS OF LEFT THIGH: ICD-10-CM

## 2024-08-20 DIAGNOSIS — Z79.4 TYPE 2 DIABETES MELLITUS WITH HYPERGLYCEMIA, WITH LONG-TERM CURRENT USE OF INSULIN (HCC): Primary | ICD-10-CM

## 2024-08-20 LAB — HBA1C MFR BLD: 10.6 %

## 2024-08-20 PROCEDURE — 3046F HEMOGLOBIN A1C LEVEL >9.0%: CPT | Performed by: INTERNAL MEDICINE

## 2024-08-20 PROCEDURE — 99215 OFFICE O/P EST HI 40 MIN: CPT | Performed by: INTERNAL MEDICINE

## 2024-08-20 PROCEDURE — 83036 HEMOGLOBIN GLYCOSYLATED A1C: CPT | Performed by: INTERNAL MEDICINE

## 2024-08-20 RX ORDER — ONDANSETRON HYDROCHLORIDE 8 MG/1
8 TABLET, FILM COATED ORAL EVERY 8 HOURS PRN
COMMUNITY
Start: 2024-06-23

## 2024-08-20 RX ORDER — LORAZEPAM 1 MG/1
1 TABLET ORAL DAILY PRN
Qty: 30 TABLET | Refills: 0 | Status: SHIPPED | OUTPATIENT
Start: 2024-08-20 | End: 2024-09-19

## 2024-08-20 NOTE — PROGRESS NOTES
other medications prescribed for you. Read the directions carefully, and ask your doctor or other care provider to review them with you.                STOP taking these medications      spironolactone 25 MG tablet  Commonly known as: ALDACTONE  Stopped by: Dr. Alverto Segal MD               Where to Get Your Medications        These medications were sent to [facility] [address] [place] [address] - [address] [facility] - P [phone] - F [phone] [address] [facility] [address]      Phone: [phone]   LORazepam 1 MG tablet           Medications marked \"taking\" at this time  Outpatient Medications Marked as Taking for the 8/20/24 encounter (Office Visit) with Alverto Segal MD   Medication Sig Dispense Refill    ondansetron (ZOFRAN) 8 MG tablet Take 1 tablet by mouth every 8 hours as needed      LORazepam (ATIVAN) 1 MG tablet Take 1 tablet by mouth daily as needed for Anxiety for up to 30 days. Max Daily Amount: 1 mg 30 tablet 0    LANTUS SOLOSTAR 100 UNIT/ML injection pen INJECT 70 UNITS SUBCUTANEOUSLY NIGHTLY (Patient taking differently: Taking 50 units) 15 mL 0    clopidogrel (PLAVIX) 75 MG tablet TAKE 1 TABLET DAILY 30 tablet 5    rosuvastatin (CRESTOR) 40 MG tablet Take 1 tablet by mouth once daily 90 tablet 0    vitamin D (ERGOCALCIFEROL) 1.25 MG (77066 UT) CAPS capsule Take 1 capsule by mouth once a week 4 capsule 5    carvedilol (COREG) 6.25 MG tablet Take 1 tablet by mouth twice daily 60 tablet 3    sertraline (ZOLOFT) 100 MG tablet Take 1 tablet by mouth daily 90 tablet 1    LORazepam (ATIVAN) 1 MG tablet Take 1 tablet by mouth every 6 hours as needed for Anxiety.      valsartan (DIOVAN) 80 MG tablet Take 1 tablet by mouth once daily (Patient taking differently: Take 0.5 tablets by mouth daily) 90 tablet 0    JARDIANCE 25 MG tablet Take 1 tablet by mouth daily 30 tablet 5    insulin lispro, 1 Unit Dial, (HUMALOG KWIKPEN) 100 UNIT/ML SOPN Follow pre meal sliding scaleFollow pre meal

## 2024-08-21 ENCOUNTER — OFFICE VISIT (OUTPATIENT)
Dept: SURGERY | Age: 59
End: 2024-08-21
Payer: COMMERCIAL

## 2024-08-21 VITALS
HEIGHT: 68 IN | BODY MASS INDEX: 37.28 KG/M2 | HEART RATE: 85 BPM | DIASTOLIC BLOOD PRESSURE: 69 MMHG | SYSTOLIC BLOOD PRESSURE: 118 MMHG | WEIGHT: 246 LBS | OXYGEN SATURATION: 96 % | TEMPERATURE: 97.2 F

## 2024-08-21 DIAGNOSIS — L02.416 ABSCESS OF LEFT THIGH: ICD-10-CM

## 2024-08-21 DIAGNOSIS — E11.65 TYPE 2 DIABETES MELLITUS WITH HYPERGLYCEMIA, WITH LONG-TERM CURRENT USE OF INSULIN (HCC): ICD-10-CM

## 2024-08-21 DIAGNOSIS — N64.4 BREAST PAIN, RIGHT: Primary | ICD-10-CM

## 2024-08-21 DIAGNOSIS — N64.4 BREAST PAIN: ICD-10-CM

## 2024-08-21 DIAGNOSIS — Z79.4 TYPE 2 DIABETES MELLITUS WITH HYPERGLYCEMIA, WITH LONG-TERM CURRENT USE OF INSULIN (HCC): ICD-10-CM

## 2024-08-21 DIAGNOSIS — L02.214 ABSCESS OF GROIN, LEFT: ICD-10-CM

## 2024-08-21 LAB
ALBUMIN: 4.3 G/DL (ref 3.5–5.2)
ALP BLD-CCNC: 171 U/L (ref 35–104)
ALT SERPL-CCNC: 13 U/L (ref 0–32)
ANION GAP SERPL CALCULATED.3IONS-SCNC: 16 MMOL/L (ref 7–16)
AST SERPL-CCNC: 26 U/L (ref 0–31)
BASOPHILS ABSOLUTE: 0.07 K/UL (ref 0–0.2)
BASOPHILS RELATIVE PERCENT: 1 % (ref 0–2)
BILIRUB SERPL-MCNC: 0.3 MG/DL (ref 0–1.2)
BUN BLDV-MCNC: 14 MG/DL (ref 6–20)
CALCIUM SERPL-MCNC: 9.6 MG/DL (ref 8.6–10.2)
CHLORIDE BLD-SCNC: 100 MMOL/L (ref 98–107)
CO2: 22 MMOL/L (ref 22–29)
CREAT SERPL-MCNC: 0.6 MG/DL (ref 0.5–1)
EOSINOPHILS ABSOLUTE: 0.11 K/UL (ref 0.05–0.5)
EOSINOPHILS RELATIVE PERCENT: 1 % (ref 0–6)
GFR, ESTIMATED: >90 ML/MIN/1.73M2
GLUCOSE BLD-MCNC: 195 MG/DL (ref 74–99)
HCT VFR BLD CALC: 38.3 % (ref 34–48)
HEMOGLOBIN: 12.1 G/DL (ref 11.5–15.5)
IMMATURE GRANULOCYTES %: 1 % (ref 0–5)
IMMATURE GRANULOCYTES ABSOLUTE: 0.14 K/UL (ref 0–0.58)
LYMPHOCYTES ABSOLUTE: 2.16 K/UL (ref 1.5–4)
LYMPHOCYTES RELATIVE PERCENT: 17 % (ref 20–42)
MCH RBC QN AUTO: 30.9 PG (ref 26–35)
MCHC RBC AUTO-ENTMCNC: 31.6 G/DL (ref 32–34.5)
MCV RBC AUTO: 98 FL (ref 80–99.9)
MONOCYTES ABSOLUTE: 0.77 K/UL (ref 0.1–0.95)
MONOCYTES RELATIVE PERCENT: 6 % (ref 2–12)
NEUTROPHILS ABSOLUTE: 9.25 K/UL (ref 1.8–7.3)
NEUTROPHILS RELATIVE PERCENT: 74 % (ref 43–80)
PDW BLD-RTO: 19.2 % (ref 11.5–15)
PLATELET # BLD: 325 K/UL (ref 130–450)
PMV BLD AUTO: 9.7 FL (ref 7–12)
POTASSIUM SERPL-SCNC: 4.4 MMOL/L (ref 3.5–5)
RBC # BLD: 3.91 M/UL (ref 3.5–5.5)
SODIUM BLD-SCNC: 138 MMOL/L (ref 132–146)
TOTAL PROTEIN: 8 G/DL (ref 6.4–8.3)
WBC # BLD: 12.5 K/UL (ref 4.5–11.5)

## 2024-08-21 PROCEDURE — 99203 OFFICE O/P NEW LOW 30 MIN: CPT | Performed by: SURGERY

## 2024-08-21 NOTE — PROGRESS NOTES
[facility] Note    Assessment/Plan:      Diagnosis Orders   1. Breast pain, right      Likely fibrocystic change versus neuropathic.  Recommended evening primrose oil.  She has mammogram pending.  Limit caffeine; warm compresses, voltaren cream      2. Abscess of groin, left      Has resolved.  No issues.            Chief Complaint   Patient presents with    New Patient     Breast pain, abscess of left thigh     Abscess    Breast Pain       PCP: Alverto Segal MD    HPI: Elzbieta Martinez is a 58 y.o. female who presents in consultation for abscess.  There is on her left thigh.  She says it was in the vaginal area.  She was placed on antibiotics.  She has area has resolved.  She has no pain or discomfort with it.  There is no drainage.  She also notes having right breast pain.  She says she had a right upper lobectomy in February.  That is through her posterior incision.  She has had increase in the bleeding incisional pain.  She said that her right breast became numb.  She is not having discomfort in the right upper outer quadrant of her right breast.  She has a mammogram pending.    Past Medical History:   Diagnosis Date    CAD (coronary artery disease) 2005    First MI at age 39.,  CABG in 2005 triple vessel, total of 4 stents placed-last in October 2018.    Cardiac arrest ([facility]) 2022    COPD (chronic obstructive pulmonary disease) ([facility]) 2015 02 PRN    HTN (hypertension) 2005    Hyperlipidemia 2005    Lumbar disc disease     Lymphedema 10/2018    LORA (obstructive sleep apnea)     not on CPAP    Parotid tumor 10/2018    Left s/p removal    Tobacco abuse 1985    Type 2 diabetes mellitus ([facility])        Past Surgical History:   Procedure Laterality Date    CARDIAC CATHETERIZATION  12/2020    CORONARY ANGIOPLASTY WITH STENT PLACEMENT  2022    [facility]    CORONARY ARTERY BYPASS GRAFT  2005    three vessel    CYSTOSCOPY      For enuresis    HYSTERECTOMY (CERVIX STATUS UNKNOWN)      Menorrhagia    LUNG CANCER

## 2024-08-22 LAB — SED RATE, AUTOMATED: 75 MM/HR (ref 0–20)

## 2024-08-27 RX ORDER — INSULIN GLARGINE 100 [IU]/ML
INJECTION, SOLUTION SUBCUTANEOUS
Qty: 15 ML | Refills: 0 | Status: SHIPPED | OUTPATIENT
Start: 2024-08-27

## 2024-08-27 NOTE — TELEPHONE ENCOUNTER
Last Appointment:  8/20/2024  Future Appointments   Date Time Provider Department Center   9/13/2024 11:00 AM Alverto Segal MD [facility]

## 2024-08-30 ENCOUNTER — OFFICE VISIT (OUTPATIENT)
Dept: ORTHOPEDIC SURGERY | Age: 59
End: 2024-08-30
Payer: COMMERCIAL

## 2024-08-30 VITALS — HEIGHT: 68 IN | BODY MASS INDEX: 37.28 KG/M2 | WEIGHT: 246 LBS

## 2024-08-30 DIAGNOSIS — S62.662A CLOSED NONDISPLACED FRACTURE OF DISTAL PHALANX OF RIGHT MIDDLE FINGER, INITIAL ENCOUNTER: Primary | ICD-10-CM

## 2024-08-30 PROCEDURE — 99203 OFFICE O/P NEW LOW 30 MIN: CPT | Performed by: PHYSICIAN ASSISTANT

## 2024-08-30 RX ORDER — VALSARTAN 40 MG/1
40 TABLET ORAL DAILY
COMMUNITY
Start: 2024-06-17

## 2024-08-30 NOTE — PROGRESS NOTES
[facility]  New Patient Note      CHIEF COMPLAINT:   Chief Complaint   Patient presents with    finger pain     Right finger pain, 2-4, x 2 weeks, fingers got caught in the dog collar.       HISTORY OF PRESENT ILLNESS:                The patient is a 58 y.o. female who presents today with complaints of right finger pain, fingers 2-4, x 2 weeks.  States patient fingers got caught in her dog's collar.  She was initially seen at primary care who took imaging advised her she is a nondisplaced spiral fracture of the proximal phalanx of the third digit.  They did advise her to proceed to orthopedic walk-in for further evaluation and definitive treatment options.  Pt localizes the pain to proximal and middle phalanx of the third digit.  Pt denies any numbness, tingling, loss of sensation or radiation of symptoms into fingertips.  Pain is worse with palpation, range of motion.  They have tried at home therapies of none.  Pt has never injured this finger in the past.  Pt is right hand dominant.        Past Medical History:        Diagnosis Date    CAD (coronary artery disease) 2005    First MI at age 39.,  CABG in 2005 triple vessel, total of 4 stents placed-last in October 2018.    Cardiac arrest ([facility]) 2022    COPD (chronic obstructive pulmonary disease) ([facility]) 2015 02 PRN    HTN (hypertension) 2005    Hyperlipidemia 2005    Lumbar disc disease     Lymphedema 10/2018    LORA (obstructive sleep apnea)     not on CPAP    Parotid tumor 10/2018    Left s/p removal    Tobacco abuse 1985    Type 2 diabetes mellitus ([facility])      Past Surgical History:        Procedure Laterality Date    CARDIAC CATHETERIZATION  12/2020    CORONARY ANGIOPLASTY WITH STENT PLACEMENT  2022    [facility]    CORONARY ARTERY BYPASS GRAFT  2005    three vessel    CYSTOSCOPY      For enuresis    HYSTERECTOMY (CERVIX STATUS UNKNOWN)      Menorrhagia    LUNG CANCER SURGERY      PAROTIDECTOMY      left    TONSILLECTOMY AND ADENOIDECTOMY       using voice recognition software. Every effort was made to ensure accuracy; however, inadvertent computerized transcription errors may be present.**

## 2024-09-13 RX ORDER — INSULIN GLARGINE 100 [IU]/ML
INJECTION, SOLUTION SUBCUTANEOUS
Qty: 15 ML | Refills: 0 | Status: SHIPPED | OUTPATIENT
Start: 2024-09-13

## 2024-09-26 RX ORDER — ALBUTEROL SULFATE 90 UG/1
2 INHALANT RESPIRATORY (INHALATION) EVERY 6 HOURS PRN
Qty: 9 G | Refills: 0 | Status: SHIPPED | OUTPATIENT
Start: 2024-09-26

## 2024-10-01 RX ORDER — CARVEDILOL 6.25 MG/1
TABLET ORAL
Qty: 180 TABLET | Refills: 1 | Status: SHIPPED | OUTPATIENT
Start: 2024-10-01

## 2024-10-01 RX ORDER — EMPAGLIFLOZIN 25 MG/1
25 TABLET, FILM COATED ORAL DAILY
Qty: 90 TABLET | Refills: 1 | Status: SHIPPED | OUTPATIENT
Start: 2024-10-01

## 2024-10-09 RX ORDER — INSULIN GLARGINE 100 [IU]/ML
INJECTION, SOLUTION SUBCUTANEOUS
Qty: 15 ML | Refills: 2 | Status: SHIPPED | OUTPATIENT
Start: 2024-10-09

## 2024-11-08 DIAGNOSIS — E55.9 VITAMIN D DEFICIENCY: ICD-10-CM

## 2024-11-08 DIAGNOSIS — G96.9 DISORDER OF CENTRAL NERVOUS SYSTEM, UNSPECIFIED: ICD-10-CM

## 2024-11-08 DIAGNOSIS — F32.9 REACTIVE DEPRESSION: ICD-10-CM

## 2024-11-08 RX ORDER — SERTRALINE HYDROCHLORIDE 100 MG/1
100 TABLET, FILM COATED ORAL DAILY
Qty: 90 TABLET | Refills: 1 | Status: SHIPPED | OUTPATIENT
Start: 2024-11-08

## 2024-11-08 RX ORDER — ERGOCALCIFEROL 1.25 MG/1
50000 CAPSULE, LIQUID FILLED ORAL WEEKLY
Qty: 4 CAPSULE | Refills: 12 | Status: SHIPPED | OUTPATIENT
Start: 2024-11-08

## 2024-11-08 RX ORDER — LORAZEPAM 1 MG/1
TABLET ORAL
Qty: 30 TABLET | Refills: 0 | OUTPATIENT
Start: 2024-11-08

## 2024-11-08 RX ORDER — ROSUVASTATIN CALCIUM 40 MG/1
40 TABLET, COATED ORAL DAILY
Qty: 90 TABLET | Refills: 1 | Status: SHIPPED | OUTPATIENT
Start: 2024-11-08

## 2024-11-08 RX ORDER — CLOPIDOGREL BISULFATE 75 MG/1
TABLET ORAL
Qty: 90 TABLET | Refills: 1 | Status: SHIPPED | OUTPATIENT
Start: 2024-11-08

## 2024-11-08 RX ORDER — BACLOFEN 5 MG/1
5 TABLET ORAL 3 TIMES DAILY PRN
Qty: 270 TABLET | Refills: 3 | Status: SHIPPED | OUTPATIENT
Start: 2024-11-08

## 2024-11-08 RX ORDER — ALBUTEROL SULFATE 90 UG/1
2 INHALANT RESPIRATORY (INHALATION) EVERY 6 HOURS PRN
Qty: 9 G | Refills: 5 | Status: SHIPPED | OUTPATIENT
Start: 2024-11-08

## 2024-11-08 NOTE — TELEPHONE ENCOUNTER
Last Appointment:  8/20/2024  Future Appointments   Date Time Provider Department Center   12/11/2024 10:00 AM Alverto Segal MD [facility]      Patient is wondering if Dr. Segal would be willing to increasing her medication baclofen?  Patient stated that her muscle twitching has worsened.     Please advise.

## 2024-12-04 ENCOUNTER — TELEPHONE (OUTPATIENT)
Dept: FAMILY MEDICINE CLINIC | Age: 59
End: 2024-12-04

## 2024-12-11 ENCOUNTER — OFFICE VISIT (OUTPATIENT)
Dept: FAMILY MEDICINE CLINIC | Age: 59
End: 2024-12-11

## 2024-12-11 VITALS
DIASTOLIC BLOOD PRESSURE: 82 MMHG | TEMPERATURE: 97.5 F | SYSTOLIC BLOOD PRESSURE: 124 MMHG | BODY MASS INDEX: 36.95 KG/M2 | HEART RATE: 91 BPM | OXYGEN SATURATION: 94 % | WEIGHT: 243 LBS

## 2024-12-11 DIAGNOSIS — Z79.4 TYPE 2 DIABETES MELLITUS WITH HYPERGLYCEMIA, WITH LONG-TERM CURRENT USE OF INSULIN (HCC): Primary | ICD-10-CM

## 2024-12-11 DIAGNOSIS — E11.65 TYPE 2 DIABETES MELLITUS WITH HYPERGLYCEMIA, WITH LONG-TERM CURRENT USE OF INSULIN (HCC): Primary | ICD-10-CM

## 2024-12-11 DIAGNOSIS — I25.10 CORONARY ARTERY DISEASE INVOLVING NATIVE CORONARY ARTERY OF NATIVE HEART WITHOUT ANGINA PECTORIS: ICD-10-CM

## 2024-12-11 DIAGNOSIS — E03.9 HYPOTHYROIDISM, UNSPECIFIED TYPE: ICD-10-CM

## 2024-12-11 DIAGNOSIS — T14.90XA TRAUMA: ICD-10-CM

## 2024-12-11 DIAGNOSIS — E78.2 MIXED HYPERLIPIDEMIA: ICD-10-CM

## 2024-12-11 DIAGNOSIS — E66.01 SEVERE OBESITY (BMI 35.0-39.9) WITH COMORBIDITY: ICD-10-CM

## 2024-12-11 DIAGNOSIS — G25.3 MYOCLONIC JERKING: ICD-10-CM

## 2024-12-11 DIAGNOSIS — J43.2 CENTRILOBULAR EMPHYSEMA (HCC): ICD-10-CM

## 2024-12-11 DIAGNOSIS — E11.59 TYPE 2 DIABETES MELLITUS WITH OTHER CIRCULATORY COMPLICATION, WITHOUT LONG-TERM CURRENT USE OF INSULIN (HCC): ICD-10-CM

## 2024-12-11 LAB
ALBUMIN: 4.3 G/DL (ref 3.5–5.2)
ALBUMIN: NORMAL
ALP BLD-CCNC: 114 U/L (ref 35–104)
ALP BLD-CCNC: NORMAL U/L
ALT SERPL-CCNC: 12 U/L (ref 0–32)
ALT SERPL-CCNC: NORMAL U/L
ANION GAP SERPL CALCULATED.3IONS-SCNC: 15 MMOL/L (ref 7–16)
ANION GAP SERPL CALCULATED.3IONS-SCNC: NORMAL MMOL/L
AST SERPL-CCNC: 18 U/L (ref 0–31)
AST SERPL-CCNC: NORMAL U/L
BASOPHILS ABSOLUTE: 0.06 K/UL (ref 0–0.2)
BASOPHILS ABSOLUTE: NORMAL
BASOPHILS RELATIVE PERCENT: 1 % (ref 0–2)
BASOPHILS RELATIVE PERCENT: NORMAL
BILIRUB SERPL-MCNC: 0.2 MG/DL (ref 0–1.2)
BILIRUB SERPL-MCNC: NORMAL MG/DL
BUN BLDV-MCNC: 18 MG/DL (ref 6–20)
BUN BLDV-MCNC: NORMAL MG/DL
CALCIUM SERPL-MCNC: 9.8 MG/DL (ref 8.6–10.2)
CALCIUM SERPL-MCNC: NORMAL MG/DL
CHLORIDE BLD-SCNC: 99 MMOL/L (ref 98–107)
CHLORIDE BLD-SCNC: NORMAL MMOL/L
CHOLESTEROL, TOTAL: 232 MG/DL
CO2: 24 MMOL/L (ref 22–29)
CO2: NORMAL
CREAT SERPL-MCNC: 0.7 MG/DL (ref 0.5–1)
CREAT SERPL-MCNC: NORMAL MG/DL
EOSINOPHILS ABSOLUTE: 0.18 K/UL (ref 0.05–0.5)
EOSINOPHILS ABSOLUTE: NORMAL
EOSINOPHILS RELATIVE PERCENT: 2 % (ref 0–6)
EOSINOPHILS RELATIVE PERCENT: NORMAL
GFR, ESTIMATED: >90 ML/MIN/1.73M2
GFR, ESTIMATED: NORMAL
GLUCOSE BLD-MCNC: 237 MG/DL (ref 74–99)
GLUCOSE BLD-MCNC: NORMAL MG/DL
HBA1C MFR BLD: 9.8 %
HCT VFR BLD CALC: 46.5 % (ref 34–48)
HCT VFR BLD CALC: NORMAL %
HDLC SERPL-MCNC: 29 MG/DL
HEMOGLOBIN: 14.7 G/DL (ref 11.5–15.5)
HEMOGLOBIN: NORMAL
IMMATURE GRANULOCYTES %: 1 % (ref 0–5)
IMMATURE GRANULOCYTES ABSOLUTE: 0.06 K/UL (ref 0–0.58)
LDL CHOLESTEROL: 159 MG/DL
LYMPHOCYTES ABSOLUTE: 1.86 K/UL (ref 1.5–4)
LYMPHOCYTES ABSOLUTE: NORMAL
LYMPHOCYTES RELATIVE PERCENT: 17 % (ref 20–42)
LYMPHOCYTES RELATIVE PERCENT: NORMAL
MCH RBC QN AUTO: 29.8 PG (ref 26–35)
MCH RBC QN AUTO: NORMAL PG
MCHC RBC AUTO-ENTMCNC: 31.6 G/DL (ref 32–34.5)
MCHC RBC AUTO-ENTMCNC: NORMAL G/DL
MCV RBC AUTO: 94.1 FL (ref 80–99.9)
MCV RBC AUTO: NORMAL FL
MONOCYTES ABSOLUTE: 0.73 K/UL (ref 0.1–0.95)
MONOCYTES ABSOLUTE: NORMAL
MONOCYTES RELATIVE PERCENT: 7 % (ref 2–12)
MONOCYTES RELATIVE PERCENT: NORMAL
NEUTROPHILS ABSOLUTE: 7.92 K/UL (ref 1.8–7.3)
NEUTROPHILS ABSOLUTE: NORMAL
NEUTROPHILS RELATIVE PERCENT: 73 % (ref 43–80)
NEUTROPHILS RELATIVE PERCENT: NORMAL
PDW BLD-RTO: 14.9 % (ref 11.5–15)
PLATELET # BLD: 351 K/UL (ref 130–450)
PLATELET # BLD: NORMAL 10*3/UL
PMV BLD AUTO: 10.2 FL (ref 7–12)
PMV BLD AUTO: NORMAL FL
POTASSIUM SERPL-SCNC: 4.7 MMOL/L (ref 3.5–5)
POTASSIUM SERPL-SCNC: NORMAL MMOL/L
RBC # BLD: 4.94 M/UL (ref 3.5–5.5)
RBC # BLD: NORMAL 10*6/UL
SODIUM BLD-SCNC: 138 MMOL/L (ref 132–146)
SODIUM BLD-SCNC: NORMAL MMOL/L
TOTAL PROTEIN: 7.9 G/DL (ref 6.4–8.3)
TOTAL PROTEIN: NORMAL
TRIGL SERPL-MCNC: 221 MG/DL
TSH SERPL DL<=0.05 MIU/L-ACNC: 17.21 UIU/ML (ref 0.27–4.2)
VLDLC SERPL CALC-MCNC: 44 MG/DL
WBC # BLD: 10.8 K/UL (ref 4.5–11.5)
WBC # BLD: NORMAL 10*3/UL

## 2024-12-11 RX ORDER — ACYCLOVIR 400 MG/1
1 TABLET ORAL 4 TIMES DAILY
Qty: 1 EACH | Refills: 0 | Status: SHIPPED | OUTPATIENT
Start: 2024-12-11

## 2024-12-11 RX ORDER — PROCHLORPERAZINE 25 MG/1
1 SUPPOSITORY RECTAL 4 TIMES DAILY
Qty: 1 EACH | Refills: 0 | Status: SHIPPED | OUTPATIENT
Start: 2024-12-11

## 2024-12-11 RX ORDER — TRAMADOL HYDROCHLORIDE 50 MG/1
1 TABLET ORAL DAILY PRN
COMMUNITY

## 2024-12-11 RX ORDER — DIPHENHYDRAMINE HCL 25 MG/1
CAPSULE ORAL
COMMUNITY
Start: 2024-10-22

## 2024-12-11 RX ORDER — FAMOTIDINE 20 MG/1
TABLET, FILM COATED ORAL
COMMUNITY
Start: 2024-10-22

## 2024-12-11 RX ORDER — LEVOTHYROXINE SODIUM 50 UG/1
TABLET ORAL
COMMUNITY
End: 2024-12-12 | Stop reason: ALTCHOICE

## 2024-12-11 RX ORDER — PREDNISONE 50 MG/1
TABLET ORAL
COMMUNITY
Start: 2024-10-22

## 2024-12-11 RX ORDER — BACLOFEN 10 MG/1
10 TABLET ORAL 3 TIMES DAILY
Qty: 90 TABLET | Refills: 3 | Status: SHIPPED | OUTPATIENT
Start: 2024-12-11

## 2024-12-11 SDOH — ECONOMIC STABILITY: FOOD INSECURITY: WITHIN THE PAST 12 MONTHS, YOU WORRIED THAT YOUR FOOD WOULD RUN OUT BEFORE YOU GOT MONEY TO BUY MORE.: NEVER TRUE

## 2024-12-11 SDOH — ECONOMIC STABILITY: FOOD INSECURITY: WITHIN THE PAST 12 MONTHS, THE FOOD YOU BOUGHT JUST DIDN'T LAST AND YOU DIDN'T HAVE MONEY TO GET MORE.: NEVER TRUE

## 2024-12-11 SDOH — ECONOMIC STABILITY: INCOME INSECURITY: HOW HARD IS IT FOR YOU TO PAY FOR THE VERY BASICS LIKE FOOD, HOUSING, MEDICAL CARE, AND HEATING?: NOT HARD AT ALL

## 2024-12-11 ASSESSMENT — PATIENT HEALTH QUESTIONNAIRE - PHQ9
3. TROUBLE FALLING OR STAYING ASLEEP: NEARLY EVERY DAY
9. THOUGHTS THAT YOU WOULD BE BETTER OFF DEAD, OR OF HURTING YOURSELF: NOT AT ALL
4. FEELING TIRED OR HAVING LITTLE ENERGY: NEARLY EVERY DAY
2. FEELING DOWN, DEPRESSED OR HOPELESS: NEARLY EVERY DAY
SUM OF ALL RESPONSES TO PHQ QUESTIONS 1-9: 15
7. TROUBLE CONCENTRATING ON THINGS, SUCH AS READING THE NEWSPAPER OR WATCHING TELEVISION: NOT AT ALL
10. IF YOU CHECKED OFF ANY PROBLEMS, HOW DIFFICULT HAVE THESE PROBLEMS MADE IT FOR YOU TO DO YOUR WORK, TAKE CARE OF THINGS AT HOME, OR GET ALONG WITH OTHER PEOPLE: NOT DIFFICULT AT ALL
8. MOVING OR SPEAKING SO SLOWLY THAT OTHER PEOPLE COULD HAVE NOTICED. OR THE OPPOSITE, BEING SO FIGETY OR RESTLESS THAT YOU HAVE BEEN MOVING AROUND A LOT MORE THAN USUAL: NOT AT ALL
SUM OF ALL RESPONSES TO PHQ QUESTIONS 1-9: 15
SUM OF ALL RESPONSES TO PHQ9 QUESTIONS 1 & 2: 6
1. LITTLE INTEREST OR PLEASURE IN DOING THINGS: NEARLY EVERY DAY
5. POOR APPETITE OR OVEREATING: NEARLY EVERY DAY
6. FEELING BAD ABOUT YOURSELF - OR THAT YOU ARE A FAILURE OR HAVE LET YOURSELF OR YOUR FAMILY DOWN: NOT AT ALL

## 2024-12-11 NOTE — PROGRESS NOTES
Subjective:   HPI:  Follow up of Hospital problems/diagnosis(es): Lung cancer with nodule removal, postop atelectasis, uncontrolled diabetes, congestive heart failure with decreased ejection fraction, hypertension, obesity, history of previous CVA    Inpatient course: Discharge summary reviewed- see chart.    Interval history/Current status: 58-year-old female with recent VATS procedure for lung nodule removal.  I do not have the pathology from this.  Patient does see oncology tomorrow through the [facility] system.  Patient has had some difficulty breathing.  Chest x-ray showed atelectatic changes which was done yesterday.  Patient is not currently taking breathing treatments on a regular basis but only on as as-needed basis.  She is having a lot of postoperative a pain especially up into the right shoulder.  She denies fever, chills, sweats.  No significant cough or sputum production.  Hemoglobin A1c today has decreased from 10 done previously to 8 today.  Blood work will be obtained tomorrow through the oncologist.  Patient's anxiety she states is significantly worsened since the surgery.  She has had some constipation postsurgery also.    Update April 3, 2024  Patient recently had a port placed on the left side.  She is having some discomfort there but denies fever, chills, sweats.  The cellulitic process which appeared to be at her port site on the right side seems to be much improved.  She has completed her antibiotic therapy.  The patient has met with her oncologist.  She will be scheduled for PET scan and MRI of the brain prior to chemotherapy.  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  This is quite extensive in nature.  She has very poor lung function which will make it impossible to use radiation therapy.  The patient will be on chemotherapeutic agents which will be quite difficult to tolerate including cisplatin.  She will also be on PD-L1 inhibitors.  I did go over

## 2024-12-12 DIAGNOSIS — E78.2 MIXED HYPERLIPIDEMIA: Primary | ICD-10-CM

## 2024-12-12 DIAGNOSIS — E03.9 ACQUIRED HYPOTHYROIDISM: ICD-10-CM

## 2024-12-12 RX ORDER — LEVOTHYROXINE SODIUM 75 MCG
75 TABLET ORAL DAILY
Qty: 30 TABLET | Refills: 3
Start: 2024-12-12 | End: 2024-12-12

## 2024-12-12 RX ORDER — EZETIMIBE 10 MG/1
10 TABLET ORAL DAILY
Qty: 90 TABLET | Refills: 1 | Status: SHIPPED | OUTPATIENT
Start: 2024-12-12

## 2024-12-12 RX ORDER — LEVOTHYROXINE SODIUM 75 MCG
75 TABLET ORAL DAILY
Qty: 30 TABLET | Refills: 3 | Status: SHIPPED | OUTPATIENT
Start: 2024-12-12

## 2024-12-13 RX ORDER — EZETIMIBE 10 MG/1
10 TABLET ORAL DAILY
Qty: 90 TABLET | Refills: 1 | Status: SHIPPED | OUTPATIENT
Start: 2024-12-13

## 2025-01-20 RX ORDER — ACYCLOVIR 400 MG/1
1 TABLET ORAL
Qty: 9 EACH | Refills: 3 | Status: SHIPPED | OUTPATIENT
Start: 2025-01-20

## 2025-01-28 NOTE — CARE COORDINATION
Ambulatory Care Coordination Note  9/13/2022    ACC: Johnny Murphy, RN    Summary Note:  EDD spoke with Lurena Schilder. She is agreeable to enroll in care coordination. She states she is motivated to work on better control of her blood sugars. EDD and Lurena Schilder reviewed medication list. She states she regularly misses medications attributing this to forgetting. She does have all her medications filled and can afford them. Reviewed upcoming appointment with patient. Plan  Send welcome letter, DM zone tool, CHF zone tool, A1C chart, healthy meal planning and blood sugar log  Complete SDOH at next contact  Follow for care coordination    Ambulatory Care Coordination Assessment    Care Coordination Protocol  Referral from Primary Care Provider: No  Week 1 - Initial Assessment     Do you have all of your prescriptions and are they filled?: Yes  Barriers to medication adherence: Forgets to take  Are you able to afford your medications?: Yes  How often do you have trouble taking your medications the way you have been told to take them?: Sometimes I take them as prescribed. Do you have Home O2 Therapy?: Yes   Oxygen Regimen: PRN    Method of Delivery: Nasal Cannula   CPAP Use: BiPAP      Ability to seek help/take action for Emergent Urgent situations i.e. fire, crime, inclement weather or health crisis. : Independent  Ability to ambulate to restroom: Independent  Ability handle personal hygeine needs (bathing/dressing/grooming): Needs Assistance  Ability to manage Medications: Needs Assistance  Ability to prepare Food Preparation: Needs Assistance  Ability to maintain home (clean home, laundry): Needs Assistance  Ability to drive and/or has transportation: Dependent  Ability to do shopping: Needs Assistance  Ability to manage finances: Needs Assistance     Current Housing: Private Residence        Per the Fall Risk Screening, did the patient have 2 or more falls or 1 fall with injury in the past year?: Yes  How often do you think you are about to fall and you do NOT fall? For example, you grab something to stabilize yourself or hold onto a wall/furniture?: Occasionally  Use of a Mobility Aid: Yes  Difficulty walking/impaired gait: Yes  Issues with feet or shoes like numbness, edema, shoes not fitting: No  Changes in vision, poor vision or poor lighting in environment: Yes  Dizziness: No  Other Fall Risk: No     Frequent urination at night?: No  Do you use rails/bars?: No  Do you have a non-slip tub mat?: Yes     Are you experiencing loss of meaning?: No  Are you experiencing loss of hope and peace?: No     Suggested Interventions and Community Resources  Fall Risk Prevention: Completed Medi Set or Pill Pack: Not Started   Registered Dietician: Not Started   Zone Management Tools: Completed                    Prior to Admission medications    Medication Sig Start Date End Date Taking? Authorizing Provider   vitamin D (ERGOCALCIFEROL) 1.25 MG (80083 UT) CAPS capsule Take 1 capsule by mouth Twice a Week 9/5/22   Ed Chow MD   sertraline (ZOLOFT) 100 MG tablet Take 1 tablet by mouth daily 9/2/22   Ed Chow MD   vitamin D (ERGOCALCIFEROL) 1.25 MG (63608 UT) CAPS capsule Take 1 capsule by mouth once a week 9/2/22   Ed Chow MD   insulin glargine (LANTUS SOLOSTAR) 100 UNIT/ML injection pen Inject 20 Units into the skin nightly 8/10/22   Ed Chow MD   insulin lispro, 1 Unit Dial, ([facility]) 100 UNIT/ML SOPN Follow pre meal sliding scale 8/10/22   Ed Chow MD   carvedilol (COREG) 6.25 MG tablet Take 6.25 mg by mouth in the morning and 6.25 mg in the evening. Take with meals. Historical Provider, MD   senna (SENOKOT) 8.6 MG tablet Take 2 tablets by mouth in the morning.   Patient not taking: Reported on 9/2/2022    Historical Provider, MD   JARDIANCE 25 MG tablet TAKE 1 TABLET BY MOUTH ONCE DAILY 7/6/22   Historical Provider, MD   atorvastatin (LIPITOR) 40 MG tablet TAKE 1 TABLET BY MOUTH ONCE DAILY Detail Level: Simple Detail Level: Detailed Detail Level: Generalized Detail Level: Zone

## 2025-04-03 RX ORDER — INSULIN GLARGINE 100 [IU]/ML
INJECTION, SOLUTION SUBCUTANEOUS
Qty: 15 ML | Refills: 0 | Status: SHIPPED | OUTPATIENT
Start: 2025-04-03

## 2025-04-03 RX ORDER — PEN NEEDLE, DIABETIC 32GX 5/32"
NEEDLE, DISPOSABLE MISCELLANEOUS
Qty: 100 EACH | Refills: 3 | Status: SHIPPED | OUTPATIENT
Start: 2025-04-03

## 2025-04-03 NOTE — TELEPHONE ENCOUNTER
Name of Medication(s) Requested:  Requested Prescriptions     Pending Prescriptions Disp Refills    BD PEN NEEDLE FREDA 2ND GEN 32G X 4 MM MISC [Pharmacy Med Name: BD FREDA PEN NDL [id number] MIS] 100 each 3     Sig: USE 1  ONCE DAILY    LANTUS SOLOSTAR 100 UNIT/ML injection pen [Pharmacy Med Name: Lantus SoloStar 100 UNIT/ML Subcutaneous Solution Pen-injector] 15 mL 0     Sig: INJECT 70 UNITS SUBCUTANEOUSLY NIGHTLY       Medication is on current medication list Yes    Dosage and directions were verified? Yes    Quantity verified: 90 day supply     Pharmacy Verified?  Yes    Last Appointment:  12/11/2024    Future appts:  Future Appointments   Date Time Provider Department Center   4/9/2025 11:00 AM Alverto Segal MD [facility] DEP        (If no appt send self scheduling link. .REFILLAPPT)  Scheduling request sent?     [] Yes  [x] No    Does patient need updated?  [] Yes  [x] No

## 2025-04-09 ENCOUNTER — OFFICE VISIT (OUTPATIENT)
Dept: FAMILY MEDICINE CLINIC | Age: 60
End: 2025-04-09

## 2025-04-09 VITALS
TEMPERATURE: 96.6 F | WEIGHT: 245 LBS | BODY MASS INDEX: 37.13 KG/M2 | HEART RATE: 89 BPM | RESPIRATION RATE: 19 BRPM | HEIGHT: 68 IN | OXYGEN SATURATION: 96 %

## 2025-04-09 DIAGNOSIS — E66.01 MORBID (SEVERE) OBESITY DUE TO EXCESS CALORIES: ICD-10-CM

## 2025-04-09 DIAGNOSIS — J43.2 CENTRILOBULAR EMPHYSEMA (HCC): ICD-10-CM

## 2025-04-09 DIAGNOSIS — F32.9 REACTIVE DEPRESSION: ICD-10-CM

## 2025-04-09 DIAGNOSIS — C34.11 MALIGNANT NEOPLASM OF UPPER LOBE OF RIGHT LUNG (HCC): ICD-10-CM

## 2025-04-09 DIAGNOSIS — Z79.4 TYPE 2 DIABETES MELLITUS WITH HYPERGLYCEMIA, WITH LONG-TERM CURRENT USE OF INSULIN (HCC): Primary | ICD-10-CM

## 2025-04-09 DIAGNOSIS — J45.50 SEVERE PERSISTENT ASTHMA, UNSPECIFIED WHETHER COMPLICATED (HCC): ICD-10-CM

## 2025-04-09 DIAGNOSIS — E11.65 TYPE 2 DIABETES MELLITUS WITH HYPERGLYCEMIA, WITH LONG-TERM CURRENT USE OF INSULIN (HCC): Primary | ICD-10-CM

## 2025-04-09 DIAGNOSIS — E78.2 MIXED HYPERLIPIDEMIA: ICD-10-CM

## 2025-04-09 DIAGNOSIS — E03.9 ACQUIRED HYPOTHYROIDISM: ICD-10-CM

## 2025-04-09 DIAGNOSIS — G47.33 OBSTRUCTIVE SLEEP APNEA SYNDROME: ICD-10-CM

## 2025-04-09 DIAGNOSIS — I50.32 CHRONIC DIASTOLIC CONGESTIVE HEART FAILURE (HCC): ICD-10-CM

## 2025-04-09 LAB — HBA1C MFR BLD: 10.4 %

## 2025-04-09 RX ORDER — CARVEDILOL 6.25 MG/1
6.25 TABLET ORAL 2 TIMES DAILY
Qty: 180 TABLET | Refills: 1 | Status: CANCELLED | OUTPATIENT
Start: 2025-04-09

## 2025-04-09 RX ORDER — EMPAGLIFLOZIN 25 MG/1
25 TABLET, FILM COATED ORAL DAILY
Qty: 90 TABLET | Refills: 1 | Status: CANCELLED | OUTPATIENT
Start: 2025-04-09

## 2025-04-09 RX ORDER — PREDNISONE 10 MG/1
10 TABLET ORAL DAILY
Qty: 10 TABLET | Refills: 0 | Status: SHIPPED | OUTPATIENT
Start: 2025-04-09 | End: 2025-04-19

## 2025-04-09 RX ORDER — INSULIN LISPRO 100 [IU]/ML
INJECTION, SOLUTION INTRAVENOUS; SUBCUTANEOUS
Qty: 4 ADJUSTABLE DOSE PRE-FILLED PEN SYRINGE | Refills: 5 | Status: CANCELLED | OUTPATIENT
Start: 2025-04-09

## 2025-04-09 RX ORDER — SERTRALINE HYDROCHLORIDE 100 MG/1
100 TABLET, FILM COATED ORAL DAILY
Qty: 90 TABLET | Refills: 1 | Status: CANCELLED | OUTPATIENT
Start: 2025-04-09

## 2025-04-09 RX ORDER — INSULIN GLARGINE 100 [IU]/ML
60 INJECTION, SOLUTION SUBCUTANEOUS DAILY
Qty: 15 ML | Refills: 5 | Status: SHIPPED | OUTPATIENT
Start: 2025-04-09

## 2025-04-09 RX ORDER — LEVOTHYROXINE SODIUM 75 MCG
75 TABLET ORAL DAILY
Qty: 30 TABLET | Refills: 3 | Status: CANCELLED | OUTPATIENT
Start: 2025-04-09

## 2025-04-09 RX ORDER — EMPAGLIFLOZIN 25 MG/1
25 TABLET, FILM COATED ORAL DAILY
Qty: 90 TABLET | Refills: 1 | Status: SHIPPED | OUTPATIENT
Start: 2025-04-09

## 2025-04-09 RX ORDER — CLOPIDOGREL BISULFATE 75 MG/1
TABLET ORAL
Qty: 90 TABLET | Refills: 1 | Status: CANCELLED | OUTPATIENT
Start: 2025-04-09

## 2025-04-09 RX ORDER — ROSUVASTATIN CALCIUM 40 MG/1
40 TABLET, COATED ORAL DAILY
Qty: 90 TABLET | Refills: 1 | Status: CANCELLED | OUTPATIENT
Start: 2025-04-09

## 2025-04-09 RX ORDER — EZETIMIBE 10 MG/1
10 TABLET ORAL DAILY
Qty: 90 TABLET | Refills: 1 | Status: CANCELLED | OUTPATIENT
Start: 2025-04-09

## 2025-04-09 NOTE — PROGRESS NOTES
closely her insulin regimen and bring her A1c down to less than 8.    Elzbieta was seen today for diabetes.    Diagnoses and all orders for this visit:    Type 2 diabetes mellitus with hyperglycemia, with long-term current use of insulin (HCC)  -     POCT glycosylated hemoglobin (Hb A1C)    Mixed hyperlipidemia    Acquired hypothyroidism    Reactive depression    Morbid (severe) obesity due to excess calories (E66.01)    Body mass index [BMI] 37.0-37.9, adult (Z68.37)    Chronic diastolic congestive heart failure (HCC)    Malignant neoplasm of upper lobe of right lung (HCC)    Severe persistent asthma, unspecified whether complicated (HCC)    Obstructive sleep apnea syndrome    Centrilobular emphysema (HCC)    Other orders  -     predniSONE (DELTASONE) 10 MG tablet; Take 1 tablet by mouth daily for 10 days  -     JARDIANCE 25 MG tablet; Take 1 tablet by mouth daily  -     LANTUS SOLOSTAR 100 UNIT/ML injection pen; Inject 60 Units into the skin daily    No labs will be drawn today as this is just recently done other than hemoglobin A1c which is poorly controlled at 10.4.  She is to call me every 3 days with her Dexcom results.  We will slowly try to titrate the Lantus up and we may need to adjust the lispro.

## 2025-04-14 RX ORDER — INSULIN GLARGINE 100 [IU]/ML
INJECTION, SOLUTION SUBCUTANEOUS
Qty: 15 ML | Refills: 0 | OUTPATIENT
Start: 2025-04-14

## 2025-04-14 NOTE — TELEPHONE ENCOUNTER
Last Appointment:  4/9/2025  Future Appointments   Date Time Provider Department Center   8/13/2025 11:30 AM Alverto Segal MD [facility] DEP

## 2025-04-15 RX ORDER — INSULIN LISPRO 100 [IU]/ML
INJECTION, SOLUTION INTRAVENOUS; SUBCUTANEOUS
Qty: 4 ADJUSTABLE DOSE PRE-FILLED PEN SYRINGE | Refills: 5 | Status: SHIPPED | OUTPATIENT
Start: 2025-04-15

## 2025-04-15 NOTE — TELEPHONE ENCOUNTER
Last Appointment:  4/9/2025  Future Appointments   Date Time Provider Department Center   8/13/2025 11:30 AM Alverto Segal MD [facility] DEP

## 2025-05-16 RX ORDER — ACYCLOVIR 400 MG/1
TABLET ORAL
Qty: 1 EACH | Refills: 0 | Status: SHIPPED | OUTPATIENT
Start: 2025-05-16

## 2025-05-16 NOTE — TELEPHONE ENCOUNTER
Requested Prescriptions     Pending Prescriptions Disp Refills    Continuous Glucose  (DEXCOM G7 ) MER [Pharmacy Med Name: DEXCOM G7   MIS] 1 each 0     Sig: USE AS DIRECTED IN  THE  MORNING,  AT  NOON,  IN  THE  EVENING  AND  AT  BEDTIME     Last Appointment:  4/9/2025  Future Appointments   Date Time Provider Department Center   8/13/2025 11:30 AM Alverto Segal MD [facility] DEP

## 2025-05-28 RX ORDER — BACLOFEN 10 MG/1
10 TABLET ORAL 3 TIMES DAILY
Qty: 90 TABLET | Refills: 3 | Status: SHIPPED | OUTPATIENT
Start: 2025-05-28

## 2025-05-28 NOTE — TELEPHONE ENCOUNTER
Name of Medication(s) Requested:  Requested Prescriptions     Pending Prescriptions Disp Refills    baclofen (LIORESAL) 10 MG tablet [Pharmacy Med Name: Baclofen 10 MG Oral Tablet] 90 tablet 3     Sig: TAKE 1 TABLET BY MOUTH THREE TIMES DAILY       Medication is on current medication list Yes    Dosage and directions were verified? Yes    Quantity verified: 90 day supply     Pharmacy Verified?  Yes    Last Appointment:  4/9/2025    Future appts:  Future Appointments   Date Time Provider Department Center   8/13/2025 11:30 AM Alverto Segal MD [facility] DE[facility]        (If no appt send self scheduling link. .REFILLAPPT)  Scheduling request sent?     [] Yes  [x] No    Does patient need updated?  [] Yes  [x] No

## 2025-06-10 DIAGNOSIS — M19.90 ARTHRITIS: Primary | ICD-10-CM

## 2025-06-10 RX ORDER — TRAMADOL HYDROCHLORIDE 50 MG/1
50 TABLET ORAL DAILY PRN
Qty: 120 TABLET | Refills: 0 | Status: SHIPPED | OUTPATIENT
Start: 2025-06-10 | End: 2025-10-08

## 2025-06-29 DIAGNOSIS — F32.9 REACTIVE DEPRESSION: ICD-10-CM

## 2025-06-30 RX ORDER — SERTRALINE HYDROCHLORIDE 100 MG/1
100 TABLET, FILM COATED ORAL DAILY
Qty: 90 TABLET | Refills: 0 | Status: SHIPPED | OUTPATIENT
Start: 2025-06-30

## 2025-06-30 NOTE — TELEPHONE ENCOUNTER
Name of Medication(s) Requested:  Requested Prescriptions     Pending Prescriptions Disp Refills    sertraline (ZOLOFT) 100 MG tablet [Pharmacy Med Name: Sertraline HCl 100 MG Oral Tablet] 90 tablet 0     Sig: Take 1 tablet by mouth once daily       Medication is on current medication list Yes    Dosage and directions were verified? Yes    Quantity verified: 90 day supply     Pharmacy Verified?  Yes    Last Appointment:  4/9/2025    Future appts:  Future Appointments   Date Time Provider Department Center   8/13/2025 11:30 AM Alverto Segal MD [facility] DEP        (If no appt send self scheduling link. .REFILLAPPT)  Scheduling request sent?     [] Yes  [x] No    Does patient need updated?  [] Yes  [x] No

## 2025-08-01 RX ORDER — CLOPIDOGREL BISULFATE 75 MG/1
75 TABLET ORAL DAILY
Qty: 90 TABLET | Refills: 0 | Status: SHIPPED | OUTPATIENT
Start: 2025-08-01

## 2025-08-01 RX ORDER — CARVEDILOL 6.25 MG/1
6.25 TABLET ORAL 2 TIMES DAILY
Qty: 180 TABLET | Refills: 0 | Status: SHIPPED | OUTPATIENT
Start: 2025-08-01

## 2025-08-06 RX ORDER — ALBUTEROL SULFATE 90 UG/1
2 INHALANT RESPIRATORY (INHALATION) EVERY 6 HOURS PRN
Qty: 9 G | Refills: 0 | Status: SHIPPED | OUTPATIENT
Start: 2025-08-06

## 2025-08-06 RX ORDER — ALBUTEROL SULFATE 90 UG/1
2 INHALANT RESPIRATORY (INHALATION) EVERY 6 HOURS PRN
Qty: 9 G | Refills: 0 | OUTPATIENT
Start: 2025-08-06

## 2025-08-29 RX ORDER — ROSUVASTATIN CALCIUM 40 MG/1
40 TABLET, COATED ORAL DAILY
Qty: 90 TABLET | Refills: 1 | Status: SHIPPED | OUTPATIENT
Start: 2025-08-29

## 2025-09-02 RX ORDER — ALBUTEROL SULFATE 90 UG/1
2 INHALANT RESPIRATORY (INHALATION) EVERY 6 HOURS PRN
Qty: 9 G | Refills: 0 | Status: SHIPPED | OUTPATIENT
Start: 2025-09-02